# Patient Record
Sex: MALE | Race: WHITE | NOT HISPANIC OR LATINO | URBAN - METROPOLITAN AREA
[De-identification: names, ages, dates, MRNs, and addresses within clinical notes are randomized per-mention and may not be internally consistent; named-entity substitution may affect disease eponyms.]

---

## 2024-09-26 ENCOUNTER — INPATIENT (INPATIENT)
Facility: HOSPITAL | Age: 34
LOS: 13 days | Discharge: ANOTHER HOSPITAL | End: 2024-10-10
Attending: HOSPITALIST | Admitting: HOSPITALIST
Payer: COMMERCIAL

## 2024-09-26 VITALS
WEIGHT: 179.9 LBS | TEMPERATURE: 99 F | OXYGEN SATURATION: 97 % | HEART RATE: 129 BPM | SYSTOLIC BLOOD PRESSURE: 129 MMHG | DIASTOLIC BLOOD PRESSURE: 89 MMHG | RESPIRATION RATE: 16 BRPM

## 2024-09-26 DIAGNOSIS — R19.7 DIARRHEA, UNSPECIFIED: ICD-10-CM

## 2024-09-26 LAB
ALBUMIN SERPL ELPH-MCNC: 3.5 G/DL — SIGNIFICANT CHANGE UP (ref 3.3–5)
ALP SERPL-CCNC: 75 U/L — SIGNIFICANT CHANGE UP (ref 40–120)
ALT FLD-CCNC: 34 U/L — SIGNIFICANT CHANGE UP (ref 4–41)
ANION GAP SERPL CALC-SCNC: 9 MMOL/L — SIGNIFICANT CHANGE UP (ref 7–14)
ANISOCYTOSIS BLD QL: SLIGHT — SIGNIFICANT CHANGE UP
APTT BLD: 28.3 SEC — SIGNIFICANT CHANGE UP (ref 24.5–35.6)
AST SERPL-CCNC: 24 U/L — SIGNIFICANT CHANGE UP (ref 4–40)
BASOPHILS # BLD AUTO: 0.26 K/UL — HIGH (ref 0–0.2)
BASOPHILS NFR BLD AUTO: 0.9 % — SIGNIFICANT CHANGE UP (ref 0–2)
BILIRUB SERPL-MCNC: 0.3 MG/DL — SIGNIFICANT CHANGE UP (ref 0.2–1.2)
BLD GP AB SCN SERPL QL: NEGATIVE — SIGNIFICANT CHANGE UP
BLOOD GAS VENOUS COMPREHENSIVE RESULT: SIGNIFICANT CHANGE UP
BUN SERPL-MCNC: 7 MG/DL — SIGNIFICANT CHANGE UP (ref 7–23)
CALCIUM SERPL-MCNC: 8.7 MG/DL — SIGNIFICANT CHANGE UP (ref 8.4–10.5)
CHLORIDE SERPL-SCNC: 96 MMOL/L — LOW (ref 98–107)
CO2 SERPL-SCNC: 29 MMOL/L — SIGNIFICANT CHANGE UP (ref 22–31)
CREAT SERPL-MCNC: 0.97 MG/DL — SIGNIFICANT CHANGE UP (ref 0.5–1.3)
EGFR: 106 ML/MIN/1.73M2 — SIGNIFICANT CHANGE UP
EOSINOPHIL # BLD AUTO: 0.26 K/UL — SIGNIFICANT CHANGE UP (ref 0–0.5)
EOSINOPHIL NFR BLD AUTO: 0.9 % — SIGNIFICANT CHANGE UP (ref 0–6)
GLUCOSE SERPL-MCNC: 97 MG/DL — SIGNIFICANT CHANGE UP (ref 70–99)
HCT VFR BLD CALC: 47.8 % — SIGNIFICANT CHANGE UP (ref 39–50)
HGB BLD-MCNC: 15.9 G/DL — SIGNIFICANT CHANGE UP (ref 13–17)
IANC: 20.55 K/UL — HIGH (ref 1.8–7.4)
INR BLD: 1.11 RATIO — SIGNIFICANT CHANGE UP (ref 0.85–1.16)
LYMPHOCYTES # BLD AUTO: 2.52 K/UL — SIGNIFICANT CHANGE UP (ref 1–3.3)
LYMPHOCYTES # BLD AUTO: 8.7 % — LOW (ref 13–44)
MCHC RBC-ENTMCNC: 27 PG — SIGNIFICANT CHANGE UP (ref 27–34)
MCHC RBC-ENTMCNC: 33.3 GM/DL — SIGNIFICANT CHANGE UP (ref 32–36)
MCV RBC AUTO: 81.2 FL — SIGNIFICANT CHANGE UP (ref 80–100)
MICROCYTES BLD QL: SLIGHT — SIGNIFICANT CHANGE UP
MONOCYTES # BLD AUTO: 2.78 K/UL — HIGH (ref 0–0.9)
MONOCYTES NFR BLD AUTO: 9.6 % — SIGNIFICANT CHANGE UP (ref 2–14)
NEUTROPHILS # BLD AUTO: 22.64 K/UL — HIGH (ref 1.8–7.4)
NEUTROPHILS NFR BLD AUTO: 78.2 % — HIGH (ref 43–77)
PLAT MORPH BLD: NORMAL — SIGNIFICANT CHANGE UP
PLATELET # BLD AUTO: 438 K/UL — HIGH (ref 150–400)
PLATELET COUNT - ESTIMATE: NORMAL — SIGNIFICANT CHANGE UP
POLYCHROMASIA BLD QL SMEAR: SLIGHT — SIGNIFICANT CHANGE UP
POTASSIUM SERPL-MCNC: 3.8 MMOL/L — SIGNIFICANT CHANGE UP (ref 3.5–5.3)
POTASSIUM SERPL-SCNC: 3.8 MMOL/L — SIGNIFICANT CHANGE UP (ref 3.5–5.3)
PROCALCITONIN SERPL-MCNC: 0.08 NG/ML — SIGNIFICANT CHANGE UP (ref 0.02–0.1)
PROT SERPL-MCNC: 7.2 G/DL — SIGNIFICANT CHANGE UP (ref 6–8.3)
PROTHROM AB SERPL-ACNC: 12.9 SEC — SIGNIFICANT CHANGE UP (ref 9.9–13.4)
RBC # BLD: 5.89 M/UL — HIGH (ref 4.2–5.8)
RBC # FLD: 13 % — SIGNIFICANT CHANGE UP (ref 10.3–14.5)
RBC BLD AUTO: ABNORMAL
RH IG SCN BLD-IMP: NEGATIVE — SIGNIFICANT CHANGE UP
SODIUM SERPL-SCNC: 134 MMOL/L — LOW (ref 135–145)
VARIANT LYMPHS # BLD: 1.7 % — SIGNIFICANT CHANGE UP (ref 0–6)
WBC # BLD: 28.95 K/UL — HIGH (ref 3.8–10.5)
WBC # FLD AUTO: 28.95 K/UL — HIGH (ref 3.8–10.5)

## 2024-09-26 PROCEDURE — 74177 CT ABD & PELVIS W/CONTRAST: CPT | Mod: 26,MC

## 2024-09-26 PROCEDURE — 99285 EMERGENCY DEPT VISIT HI MDM: CPT

## 2024-09-26 PROCEDURE — 99223 1ST HOSP IP/OBS HIGH 75: CPT

## 2024-09-26 RX ORDER — SODIUM CHLORIDE 0.9 % (FLUSH) 0.9 %
1000 SYRINGE (ML) INJECTION ONCE
Refills: 0 | Status: COMPLETED | OUTPATIENT
Start: 2024-09-26 | End: 2024-09-26

## 2024-09-26 RX ORDER — ACETAMINOPHEN 325 MG
1000 TABLET ORAL ONCE
Refills: 0 | Status: COMPLETED | OUTPATIENT
Start: 2024-09-26 | End: 2024-09-26

## 2024-09-26 RX ADMIN — Medication 200 MILLIGRAM(S): at 19:38

## 2024-09-26 RX ADMIN — Medication 1000 MILLILITER(S): at 18:59

## 2024-09-26 RX ADMIN — Medication 100 MILLIGRAM(S): at 21:28

## 2024-09-26 NOTE — ED PROVIDER NOTE - PROGRESS NOTE DETAILS
Saint Chris, DO (PGY2): Labs notable for WBC 28. Although no tenderness on exam concerns for intra-abdominal pathology such as abscess, fistula, toxic megacolon. CT scan ordered. Will give IVF, antibiotics. Will obtain vbg and blood cultures. Saint Chris, DO (PGY2): spoke with [facility] team. Patient would not be undergoing colonoscopy given active infection. Recommending continuing antibiotics and admission. imaging notable for UC flare in setting of c diff infection. will admit for GI evaluation. Peter PGY2

## 2024-09-26 NOTE — H&P ADULT - HISTORY OF PRESENT ILLNESS
34M PMH ulcerative colitis following with Dr. Osullivan of [facility] presents with 8-10 episodes of bloody diarrhea during the day with further episodes of bloody diarrhea at night. Reports symptoms have been ongoing for appx. 3 months with acute worsening in the past day or so. Was diagnosed with C.diff on Sunday and was given fidaxomycin - has been taking for past 3 days. Reportedly is on Stelara and prednisone 40 daily. Patient reports he was told to come to the nearest hospital for urgent colonoscopy thus presenting to [facility]. ED spoke to patient's primary UC team who are deferring colonscopy at this time given active infection.  34M PMH ulcerative colitis following with Dr. Osullivan of [facility] presents with 8-10 episodes of bloody diarrhea during the day with further episodes of bloody diarrhea at night concerning for UC flare. He reports his symptoms have been ongoing for appx. 3 months and have been managed outpatient with his [facility] UC team. Initially was tapering down steroids from 40 daily down to 15, however due to recent flare was increased back to 40mg daily. He was previously on Entyvio for maintenence therapy but did not show improvement with symptoms and was switched appx. 1 month ago to Stelara - notes he received loading dose and 1 maintenence dose with some improvement, noting that after taking AM dose of prednisone his stool frequency improved and were less bloody throughout the day. Of note he was diagnosed with C.diff on Sunday and was given fidaxomycin - has been taking for past 3 days BID. Patient reports he was told to come to the nearest hospital for urgent colonoscopy thus presenting to [facility]. ED spoke to patient's primary UC team who are deferring colonscopy at this time given active infection. Denies any abdominal pain at this time, notes bowel movements are mix of blood and stool.

## 2024-09-26 NOTE — H&P ADULT - PROBLEM SELECTOR PLAN 1
-pt with Hx UC presenting with ongoing flare for 3 months notable for episodes of bloody diarrhea  -follows primarily with [facility], was previously on entyvio but switched to stelara 1 month ago s/p loading dose and 1 maintenance dose  -currently on prednisone 40 daily  -will c/w cipro/flagyl at this time  -GI consult for evaluation of symptoms and need for IV steroids, emailed -pt with Hx UC presenting with ongoing flare for 3 months notable for episodes of bloody diarrhea  -follows primarily with [facility], was previously on entyvio but switched to stelara 1 month ago s/p loading dose and 1 maintenance dose  -currently on prednisone 40 daily  -will c/w cipro/flagyl at this time  -GI consult for evaluation of symptoms and need for IV steroids, emailed  -monitor Hgb, currently stable

## 2024-09-26 NOTE — H&P ADULT - PROBLEM SELECTOR PLAN 2
-pt reports he tested positive for C. diff several days prior to admission  -on fidaxomycin outpt, has been taking BID  -will repeat stool testing here - GI PCR, C.diff, stool culture  -pt. brought own dose of fidaxomycin, can administer as per pharmacy to continue   -ID consult in AM   -monitor electrolytes  -contact precautions

## 2024-09-26 NOTE — ED ADULT NURSE NOTE - NSFALLUNIVINTERV_ED_ALL_ED
Bed/Stretcher in lowest position, wheels locked, appropriate side rails in place/Call bell, personal items and telephone in reach/Instruct patient to call for assistance before getting out of bed/chair/stretcher/Non-slip footwear applied when patient is off stretcher/[place] to call system/Physically safe environment - no spills, clutter or unnecessary equipment/Purposeful proactive rounding/Room/bathroom lighting operational, light cord in reach

## 2024-09-26 NOTE — H&P ADULT - NSHPREVIEWOFSYSTEMS_GEN_ALL_CORE
REVIEW OF SYSTEMS:    CONSTITUTIONAL: No weakness, fevers or chills  EYES/ENT: No visual changes;  No vertigo or throat pain   NECK: No pain or stiffness  RESPIRATORY: No cough, wheezing, hemoptysis; No shortness of breath  CARDIOVASCULAR: No chest pain or palpitations  GASTROINTESTINAL: No abdominal or epigastric pain. No nausea, vomiting, or hematemesis; No diarrhea or constipation. No melena or hematochezia.  GENITOURINARY: No dysuria, frequency or hematuria  NEUROLOGICAL: No numbness or weakness  SKIN: No itching, rashes REVIEW OF SYSTEMS:    CONSTITUTIONAL: No weakness, fevers or chills  EYES/ENT: No visual changes;  No vertigo or throat pain   NECK: No pain or stiffness  RESPIRATORY: No cough, wheezing, hemoptysis; No shortness of breath  CARDIOVASCULAR: No chest pain or palpitations  GASTROINTESTINAL: No abdominal or epigastric pain. No nausea, vomiting, or hematemesis; +bloody diarrhea -constipation. Mixed hematochezia and stool w/o melena.  GENITOURINARY: No dysuria, frequency or hematuria  NEUROLOGICAL: No numbness or weakness  SKIN: No itching, rashes

## 2024-09-26 NOTE — H&P ADULT - NSHPPHYSICALEXAM_GEN_ALL_CORE
VITALS:   T(C): 36.8 (09-26-24 @ 23:55), Max: 37.2 (09-26-24 @ 15:14)  HR: 96 (09-26-24 @ 23:55) (96 - 129)  BP: 108/64 (09-26-24 @ 23:55) (108/64 - 132/95)  RR: 18 (09-26-24 @ 23:55) (16 - 18)  SpO2: 100% (09-26-24 @ 23:55) (97% - 100%)    GENERAL: NAD, lying in bed comfortably  HEAD:  Atraumatic, normocephalic  EYES: EOMI, PERRLA, conjunctiva and sclera clear  ENT: Moist mucous membranes  NECK: Supple, no JVD  HEART: Regular rate and rhythm, no murmurs, rubs, or gallops  LUNGS: Unlabored respirations.  Clear to auscultation bilaterally, no crackles, wheezing, or rhonchi  ABDOMEN: Soft, nontender, nondistended, +BS  EXTREMITIES: 2+ peripheral pulses bilaterally. No clubbing, cyanosis, or edema  NERVOUS SYSTEM:  A&Ox3, no focal deficits   SKIN: No rashes or lesions

## 2024-09-26 NOTE — H&P ADULT - ASSESSMENT
34M PMH ulcerative colitis following with Dr. Osullivan of [facility] presents with 8-10 episodes of bloody diarrhea during the day with further episodes of bloody diarrhea at night, advised to come in to hospital for treatment of UC flare.

## 2024-09-26 NOTE — ED ADULT NURSE NOTE - OBJECTIVE STATEMENT
This is a 33 y/o male alert and oriented x 4, with a past medical history of recent diagnosis of c-diff, ulcerative colitis. Discomfort at 2/10. Abdomen is soft and bowel sounds present. Non tender on palpation. Chest is clear bilaterally. No sob, not in any distress. Reports he had bloody diarrhea x 10 today. Pallor noted. IV initiated on left AC g 20, blood work drawn. Waiting for physician assessment.

## 2024-09-26 NOTE — H&P ADULT - NSHPLABSRESULTS_GEN_ALL_CORE
LABS:                          15.9   28.95 )-----------( 438      ( 26 Sep 2024 18:03 )             47.8     09-26    134[L]  |  96[L]  |  7   ----------------------------<  97  3.8   |  29  |  0.97    Ca    8.7      26 Sep 2024 18:03    TPro  7.2  /  Alb  3.5  /  TBili  0.3  /  DBili  x   /  AST  24  /  ALT  34  /  AlkPhos  75  09-26    PT/INR - ( 26 Sep 2024 18:35 )   PT: 12.9 sec;   INR: 1.11 ratio         PTT - ( 26 Sep 2024 18:35 )  PTT:28.3 sec

## 2024-09-26 NOTE — ED PROVIDER NOTE - CLINICAL SUMMARY MEDICAL DECISION MAKING FREE TEXT BOX
Saint Chris, DO (PGY2): 35 y/o male, with history of UC, presenting to the ED today for concerns for worsening UC flare. He follows Dr. Osullivan ([place]), states he was sent to the ED for urgent colonoscopy Saint Chris, DO (PGY2): 33 y/o male, with history of UC, presenting to the ED today for concerns for worsening UC flare. Flare started about 3 months ago. He is currently taking Stelara and prednisone 40. He follows Dr. Osullivan ([place]), states that he was sent to the ED for urgent colonoscopy (came to [facility] as it was the closest ED to his home). He endorses bloody diarrhea, about 8-10 episodes during the day, and 8 episodes at night. States that he has been tachycardic since Tuesday, although no chest pain, SOB, lightheadedness, dizziness, neurological symptoms. Patient reports recent C Diff diagnosis on Sunday, has been prescribed Fidaxomycin, and has taken it for 3 days.    GENERAL: no acute distress, non-toxic appearing  HEAD: normocephalic, atraumatic  HEENT: oral mucosa moist, full ROM of neck  CARDIAC: regular rate rhythm, normal S1/S2  CHEST: CTA BL, no wheeze or crackles  ABDOMEN: normal BS, soft, no tenderness  EXTREMITY: no gross deformity, no edema, good perfusion   NEURO: alert and orientedx3, no focal neurological deficits    Given tachycardia in setting of bloody diarrhea, concerns for anemia. Would also consider electrolyte abnormalities. Abdomen soft and nontender, low concerns for intraabdominal pathology at this time. Will evaluate with CBC, CMP. Will obtain type and screen. Dispo and further management pending results and reassessment.

## 2024-09-26 NOTE — ED ADULT TRIAGE NOTE - CHIEF COMPLAINT QUOTE
Pt c/o multiple episodes of diarrhea, reports was diagnosed with c.diff on sunday. Endorsing bloody stool and palpitations. Denies dizziness, sob, chest pain. pmhx: ulcerative colitis

## 2024-09-26 NOTE — ED PROVIDER NOTE - ATTENDING CONTRIBUTION TO CARE
35 yo M with h/o UC and recent diagnosis of c diff earlier this week who presents to the ED c/o bloody diarrhea for the past two weeks. He reports that he has been taking prednisone 40 mg a day for the past week and having 8-10 episodes of diarrhea a day. Was started on oral abx after being diagnosed with c diff but he reports that his GI physician (who is affiliated with [facility]) recommended that he come to the ED for admission to have a colonoscopy (?). Pt denies abdominal pain and is non-tender on exam but it tachycardic to 115 and has a leukocytosis of 28. Will give IV fluids and obtain CT abd/pelvis to rule out acute intraabdominal process complicating his UC and C diff

## 2024-09-26 NOTE — ED ADULT NURSE NOTE - NS PRO PASSIVE SMOKE EXP
Spoke with pt re: below. Pt took 2 days worth of medication and BP came down to 120/70's. Pt would like to track BP off medication and will call back with update. If BP not at goal willing to try other medication except Aldactone d/t long term side effects. Agreeable to all.   Unknown

## 2024-09-27 DIAGNOSIS — K51.90 ULCERATIVE COLITIS, UNSPECIFIED, WITHOUT COMPLICATIONS: ICD-10-CM

## 2024-09-27 DIAGNOSIS — A04.72 ENTEROCOLITIS DUE TO CLOSTRIDIUM DIFFICILE, NOT SPECIFIED AS RECURRENT: ICD-10-CM

## 2024-09-27 DIAGNOSIS — Z29.9 ENCOUNTER FOR PROPHYLACTIC MEASURES, UNSPECIFIED: ICD-10-CM

## 2024-09-27 LAB
ALBUMIN SERPL ELPH-MCNC: 3.1 G/DL — LOW (ref 3.3–5)
ALP SERPL-CCNC: 57 U/L — SIGNIFICANT CHANGE UP (ref 40–120)
ALT FLD-CCNC: 31 U/L — SIGNIFICANT CHANGE UP (ref 4–41)
ANION GAP SERPL CALC-SCNC: 11 MMOL/L — SIGNIFICANT CHANGE UP (ref 7–14)
AST SERPL-CCNC: 28 U/L — SIGNIFICANT CHANGE UP (ref 4–40)
BILIRUB SERPL-MCNC: 0.3 MG/DL — SIGNIFICANT CHANGE UP (ref 0.2–1.2)
BUN SERPL-MCNC: 6 MG/DL — LOW (ref 7–23)
C DIFF GDH STL QL: POSITIVE — SIGNIFICANT CHANGE UP
C DIFF GDH STL QL: SIGNIFICANT CHANGE UP
CALCIUM SERPL-MCNC: 8 MG/DL — LOW (ref 8.4–10.5)
CHLORIDE SERPL-SCNC: 98 MMOL/L — SIGNIFICANT CHANGE UP (ref 98–107)
CO2 SERPL-SCNC: 24 MMOL/L — SIGNIFICANT CHANGE UP (ref 22–31)
CREAT SERPL-MCNC: 0.89 MG/DL — SIGNIFICANT CHANGE UP (ref 0.5–1.3)
EGFR: 117 ML/MIN/1.73M2 — SIGNIFICANT CHANGE UP
GI PCR PANEL: SIGNIFICANT CHANGE UP
GLUCOSE SERPL-MCNC: 92 MG/DL — SIGNIFICANT CHANGE UP (ref 70–99)
HCT VFR BLD CALC: 43.4 % — SIGNIFICANT CHANGE UP (ref 39–50)
HGB BLD-MCNC: 14.6 G/DL — SIGNIFICANT CHANGE UP (ref 13–17)
MAGNESIUM SERPL-MCNC: 2.2 MG/DL — SIGNIFICANT CHANGE UP (ref 1.6–2.6)
MCHC RBC-ENTMCNC: 27.2 PG — SIGNIFICANT CHANGE UP (ref 27–34)
MCHC RBC-ENTMCNC: 33.6 GM/DL — SIGNIFICANT CHANGE UP (ref 32–36)
MCV RBC AUTO: 81 FL — SIGNIFICANT CHANGE UP (ref 80–100)
NRBC # BLD: 0 /100 WBCS — SIGNIFICANT CHANGE UP (ref 0–0)
NRBC # FLD: 0 K/UL — SIGNIFICANT CHANGE UP (ref 0–0)
PHOSPHATE SERPL-MCNC: 3.7 MG/DL — SIGNIFICANT CHANGE UP (ref 2.5–4.5)
PLATELET # BLD AUTO: 382 K/UL — SIGNIFICANT CHANGE UP (ref 150–400)
POTASSIUM SERPL-MCNC: 3.8 MMOL/L — SIGNIFICANT CHANGE UP (ref 3.5–5.3)
POTASSIUM SERPL-SCNC: 3.8 MMOL/L — SIGNIFICANT CHANGE UP (ref 3.5–5.3)
PROT SERPL-MCNC: 6.1 G/DL — SIGNIFICANT CHANGE UP (ref 6–8.3)
RBC # BLD: 5.36 M/UL — SIGNIFICANT CHANGE UP (ref 4.2–5.8)
RBC # FLD: 13.1 % — SIGNIFICANT CHANGE UP (ref 10.3–14.5)
SODIUM SERPL-SCNC: 133 MMOL/L — LOW (ref 135–145)
WBC # BLD: 25.35 K/UL — HIGH (ref 3.8–10.5)
WBC # FLD AUTO: 25.35 K/UL — HIGH (ref 3.8–10.5)

## 2024-09-27 PROCEDURE — 99222 1ST HOSP IP/OBS MODERATE 55: CPT

## 2024-09-27 PROCEDURE — 99233 SBSQ HOSP IP/OBS HIGH 50: CPT

## 2024-09-27 PROCEDURE — 99222 1ST HOSP IP/OBS MODERATE 55: CPT | Mod: GC

## 2024-09-27 RX ORDER — FIDAXOMICIN 200 MG/5ML
200 GRANULE, FOR SUSPENSION ORAL
Refills: 0 | Status: DISCONTINUED | OUTPATIENT
Start: 2024-09-27 | End: 2024-10-03

## 2024-09-27 RX ORDER — INFLUENZA VIRUS VACCINE 15; 15; 15; 15 UG/.5ML; UG/.5ML; UG/.5ML; UG/.5ML
0.5 SUSPENSION INTRAMUSCULAR ONCE
Refills: 0 | Status: DISCONTINUED | OUTPATIENT
Start: 2024-09-27 | End: 2024-10-10

## 2024-09-27 RX ORDER — ONDANSETRON HCL/PF 4 MG/2 ML
4 VIAL (ML) INJECTION ONCE
Refills: 0 | Status: COMPLETED | OUTPATIENT
Start: 2024-09-27 | End: 2024-09-27

## 2024-09-27 RX ORDER — METHYLPREDNISOLONE ACETATE 80 MG/ML
20 INJECTION, SUSPENSION INTRA-ARTICULAR; INTRALESIONAL; INTRAMUSCULAR; SOFT TISSUE EVERY 8 HOURS
Refills: 0 | Status: DISCONTINUED | OUTPATIENT
Start: 2024-09-27 | End: 2024-10-10

## 2024-09-27 RX ORDER — METOCLOPRAMIDE HCL 5 MG
10 TABLET ORAL ONCE
Refills: 0 | Status: COMPLETED | OUTPATIENT
Start: 2024-09-27 | End: 2024-09-27

## 2024-09-27 RX ORDER — SODIUM CHLORIDE 0.9 % (FLUSH) 0.9 %
1000 SYRINGE (ML) INJECTION
Refills: 0 | Status: DISCONTINUED | OUTPATIENT
Start: 2024-09-27 | End: 2024-10-10

## 2024-09-27 RX ADMIN — FIDAXOMICIN 200 MILLIGRAM(S): 200 GRANULE, FOR SUSPENSION ORAL at 17:47

## 2024-09-27 RX ADMIN — Medication 100 MILLILITER(S): at 02:21

## 2024-09-27 RX ADMIN — Medication 100 MILLIGRAM(S): at 05:23

## 2024-09-27 RX ADMIN — Medication 200 MILLIGRAM(S): at 05:22

## 2024-09-27 RX ADMIN — Medication 10 MILLIGRAM(S): at 15:53

## 2024-09-27 RX ADMIN — METHYLPREDNISOLONE ACETATE 20 MILLIGRAM(S): 80 INJECTION, SUSPENSION INTRA-ARTICULAR; INTRALESIONAL; INTRAMUSCULAR; SOFT TISSUE at 17:46

## 2024-09-27 RX ADMIN — FIDAXOMICIN 200 MILLIGRAM(S): 200 GRANULE, FOR SUSPENSION ORAL at 05:27

## 2024-09-27 RX ADMIN — Medication 1000 MILLILITER(S): at 00:14

## 2024-09-27 NOTE — DISCHARGE NOTE PROVIDER - PROVIDER TOKENS
FREE:[LAST:[Primary care provider],PHONE:[(   )    -],FAX:[(   )    -]] FREE:[LAST:[Primary care provider],PHONE:[(   )    -],FAX:[(   )    -]],PROVIDER:[TOKEN:[[id number]]]

## 2024-09-27 NOTE — PROGRESS NOTE ADULT - PROBLEM SELECTOR PLAN 2
-pt reports he tested positive for C. diff several days prior to admission  -on fidaxomycin outpt, has been taking BID  -will repeat stool testing here - GI PCR, C.diff, stool culture  -pt. brought own dose of fidaxomycin, can administer as per pharmacy to continue   -ID consulted  -monitor electrolytes  -contact precautions  -procal wnl, blood culture pending [facility] Pregnancy And Lactation Text: This medication has not been assigned a Pregnancy Risk Category. It is unknown if the medication is excreted in breast milk.

## 2024-09-27 NOTE — ED ADULT NURSE REASSESSMENT NOTE - NS ED NURSE REASSESS COMMENT FT1
Break coverage RN: Pt A&Ox4, resting in stretcher no acute distress noted. Pt denies chest pain, SOB, headache, dizziness, N/V/D, chills verbalized. respirations even and unlabored. safety maintained, call bell within reach
Due meds given, no verbal complaints at this time.
Offer no complaints at this time. RR even and unlabored. VS as charted. Pending bed assignment. Pt safety maintained.
Pt received in Rm 7 in no acute distress. Denies headache, dizziness, nausea, vomiting, abd pain, chest pain, SOB. RR even and unlabored. Pt safety maintained. Pend abx infusion.
Pt noted to be in no acute distress. Denies headache, dizziness, nausea, vomiting, abd pain, chest pain, SOB. Fluids infusing as ordered. Pt safety maintained. Pending bed assignment.

## 2024-09-27 NOTE — CONSULT NOTE ADULT - SUBJECTIVE AND OBJECTIVE BOX
INFECTIOUS DISEASE CONSULT NOTE    Patient is a 34y old  Male who presents with a chief complaint of UC flare w/ bloody diarrhea (27 Sep 2024 08:35)    HPI:  34M medical history of ulcerative colitis  presents with 8-10 episodes of bloody diarrhea concerning for UC flare. He reports his symptoms have been ongoing for appx. 3 months and have been managed outpatient with his [facility] UC team. Initially was tapering down steroids from 40 daily down to 15, however due to recent flare was increased back to 40mg daily. He was previously on Entyvio for maintenence therapy but did not show improvement with symptoms and was switched appx. 1 month ago to Stelara - notes he received loading dose and 1 maintenence dose with some improvement, noting that after taking AM dose of prednisone his stool frequency improved and were less bloody throughout the day. Of note he was diagnosed with C.diff on Sunday and was given fidaxomycin - has been taking for past 3 days BID. Patient reports he was told to come to the nearest hospital for urgent colonoscopy thus presenting to [facility]. ED spoke to patient's primary UC team who are deferring colonscopy at this time given active infection. Denies any abdominal pain at this time, notes bowel movements are mix of blood and stool. (26 Sep 2024 23:59)       REVIEW OF SYSTEMS:      Prior hospital charts reviewed [Yes]  Primary team notes reviewed [Yes]  Other consultant notes reviewed [Yes]    PAST MEDICAL & SURGICAL HISTORY:  Ulcerative colitis      No significant past surgical history          SOCIAL HISTORY:      FAMILY HISTORY:  No pertinent family history in first degree relatives        Allergies:  No Known Allergies  mesalamine (Other (Mod to Severe))      ANTIMICROBIALS:  ciprofloxacin   IVPB    ciprofloxacin   IVPB 400 every 12 hours  fidaxomicin 200 two times a day  metroNIDAZOLE  IVPB 500 every 8 hours      ANTIMICROBIALS (past 90 days):  MEDICATIONS  (STANDING):    ciprofloxacin   IVPB   200 mL/Hr IV Intermittent (09-27-24 @ 05:22)    ciprofloxacin   IVPB   200 mL/Hr IV Intermittent (09-26-24 @ 19:38)    fidaxomicin   200 milliGRAM(s) Oral (09-27-24 @ 05:27)    metroNIDAZOLE  IVPB   100 mL/Hr IV Intermittent (09-26-24 @ 21:28)    metroNIDAZOLE  IVPB   100 mL/Hr IV Intermittent (09-27-24 @ 05:23)        OTHER MEDS:   MEDICATIONS  (STANDING):  influenza   Vaccine 0.5 once      VITALS:  Vital Signs Last 24 Hrs  T(F): 98.2 (09-27-24 @ 10:46), Max: 99 (09-26-24 @ 15:14)    Vital Signs Last 24 Hrs  HR: 105 (09-27-24 @ 10:46) (96 - 129)  BP: 133/89 (09-27-24 @ 10:46) (108/64 - 148/61)  RR: 18 (09-27-24 @ 10:46)  SpO2: 98% (09-27-24 @ 10:46) (97% - 100%)  Wt(kg): --    EXAM:      Labs:                        14.6   25.35 )-----------( 382      ( 27 Sep 2024 05:15 )             43.4     09-27    133[L]  |  98  |  6[L]  ----------------------------<  92  3.8   |  24  |  0.89    Ca    8.0[L]      27 Sep 2024 05:15  Phos  3.7     09-27  Mg     2.20     09-27    TPro  6.1  /  Alb  3.1[L]  /  TBili  0.3  /  DBili  x   /  AST  28  /  ALT  31  /  AlkPhos  57  09-27      WBC Trend:  WBC Count: 25.35 (09-27-24 @ 05:15)  WBC Count: 28.95 (09-26-24 @ 18:03)      Auto Neutrophil #: 22.64 K/uL (09-26-24 @ 18:03)      Creatine Trend:  Creatinine: 0.89 (09-27)  Creatinine: 0.97 (09-26)      Liver Biochemical Testing Trend:  Alanine Aminotransferase (ALT/SGPT): 31 (09-27)  Alanine Aminotransferase (ALT/SGPT): 34 (09-26)  Aspartate Aminotransferase (AST/SGOT): 28 (09-27-24 @ 05:15)  Aspartate Aminotransferase (AST/SGOT): 24 (09-26-24 @ 18:03)  Bilirubin Total: 0.3 (09-27)  Bilirubin Total: 0.3 (09-26)      Trend LDH      Auto Eosinophil %: 0.9 % (09-26-24 @ 18:03)                  MICROBIOLOGY:      Clostridium difficile GDH Toxins A&B, EIA: Positive  Clostridium difficile GD Interpretation: Positive for toxigenic C. Difficile.     Procalcitonin: 0.08 (09-26)    Blood Gas Venous - Lactate: 1.7 (09-26 @ 18:35)        RADIOLOGY:  < from: CT Abdomen and Pelvis w/ IV Cont (09.26.24 @ 21:15) >  FINDINGS:  LOWER CHEST: Within normal limits.    LIVER: Within normal limits.  BILE DUCTS: Normal caliber.  GALLBLADDER: Within normal limits.  SPLEEN: Within normal limits.  PANCREAS: Within normal limits.  ADRENALS: Within normal limits.  KIDNEYS/URETERS: Within normal limits.    BLADDER: Within normal limits.  REPRODUCTIVE ORGANS: Prostate is within normal limits.    BOWEL: No bowel obstruction. Appendix is normal. Diffuse mild colonic   wall thickening with loss of normal haustral markings on the left   consistent with ulcerative colitis and likely acute flare. No pneumatosis   or free air. No gross stricturing.  PERITONEUM/RETROPERITONEUM: Within normal limits.  VESSELS: Within normal limits.  LYMPH NODES: No lymphadenopathy.  ABDOMINAL WALL: Within normal limits.  BONES: Within normal limits.    IMPRESSION:  UC flare.       INFECTIOUS DISEASE CONSULT NOTE    Patient is a 34y old  Male who presents with a chief complaint of UC flare w/ bloody diarrhea (27 Sep 2024 08:35)    HPI:  34M medical history of ulcerative colitis  presents with 8-10 episodes of bloody diarrhea concerning for UC flare. He reports his symptoms have been ongoing for approximately 3 months and have been managed outpatient with his [facility] UC team. Initially was tapering down steroids from 40 daily down to 15, however due to recent flare was increased back to 40mg daily. He was previously on Entyvio for maintenence therapy but did not show improvement with symptoms and was switched appx. 1 month ago to Stelara - notes he received loading dose and 1 maintenance dose with some improvement, noting that after taking AM dose of prednisone his stool frequency improved and were less bloody throughout the day. Of note he was diagnosed with C.diff on Sunday and was given fidaxomycin - has been taking for past 3 days BID. Patient reports he was told to come to the nearest hospital for urgent colonoscopy thus presenting to [facility]. ED spoke to patient's primary UC team who are deferring colonscopy at this time given active infection. Denies any abdominal pain at this time, notes bowel movements are mix of blood and stool. (26 Sep 2024 23:59)       REVIEW OF SYSTEMS:  Constitutional: No fevers, No chills  Skin: No rash, no phlebitis	  Eyes: No discharge, No change in vision	  ENMT: No sore throat, No ulcers  Respiratory: No cough, no SOB  Cardiovascular:  No chest pain, No palpitations   Gastrointestinal: +abdominal pain, diarrhea  Genitourinary: No dysuria, No frequency, No hesitancy, No flank pain  MSK: No Joint pain, No back pain, No edema  Neurological: No HA, no weakness, no seizures, no AMS      Prior hospital charts reviewed [Yes]  Primary team notes reviewed [Yes]  Other consultant notes reviewed [Yes]    PAST MEDICAL & SURGICAL HISTORY:  Ulcerative colitis      No significant past surgical history          SOCIAL HISTORY:  Denies smoking  Denies alcohol use      FAMILY HISTORY:  No pertinent family history in first degree relatives        Allergies:  No Known Allergies  mesalamine (Other (Mod to Severe))      ANTIMICROBIALS:  ciprofloxacin   IVPB    ciprofloxacin   IVPB 400 every 12 hours  fidaxomicin 200 two times a day  metroNIDAZOLE  IVPB 500 every 8 hours      ANTIMICROBIALS (past 90 days):  MEDICATIONS  (STANDING):    ciprofloxacin   IVPB   200 mL/Hr IV Intermittent (09-27-24 @ 05:22)    ciprofloxacin   IVPB   200 mL/Hr IV Intermittent (09-26-24 @ 19:38)    fidaxomicin   200 milliGRAM(s) Oral (09-27-24 @ 05:27)    metroNIDAZOLE  IVPB   100 mL/Hr IV Intermittent (09-26-24 @ 21:28)    metroNIDAZOLE  IVPB   100 mL/Hr IV Intermittent (09-27-24 @ 05:23)        OTHER MEDS:   MEDICATIONS  (STANDING):  influenza   Vaccine 0.5 once      VITALS:  Vital Signs Last 24 Hrs  T(F): 98.2 (09-27-24 @ 10:46), Max: 99 (09-26-24 @ 15:14)    Vital Signs Last 24 Hrs  HR: 105 (09-27-24 @ 10:46) (96 - 129)  BP: 133/89 (09-27-24 @ 10:46) (108/64 - 148/61)  RR: 18 (09-27-24 @ 10:46)  SpO2: 98% (09-27-24 @ 10:46) (97% - 100%)  Wt(kg): --    EXAM:  General: Patient appears comfortable, in no acute distress  HEENT: PERRL, anicteric sclera, mucous membranes moist   Neck: Supple, No lymphadenopathy  CV: +S1/S2, RRR, no murmurs heard  Lungs: No respiratory distress, CTA b/l  Abd:  BS4+, Soft, NTND, no guarding  : No suprapubic tenderness  Neuro: AAOx3. No focal deficits noted.   Ext: No cyanosis, no edema  Msk: freely moving upper and lower extremities  Skin: No rash, no phlebitis, No erythema      Labs:                        14.6   25.35 )-----------( 382      ( 27 Sep 2024 05:15 )             43.4     09-27    133[L]  |  98  |  6[L]  ----------------------------<  92  3.8   |  24  |  0.89    Ca    8.0[L]      27 Sep 2024 05:15  Phos  3.7     09-27  Mg     2.20     09-27    TPro  6.1  /  Alb  3.1[L]  /  TBili  0.3  /  DBili  x   /  AST  28  /  ALT  31  /  AlkPhos  57  09-27      WBC Trend:  WBC Count: 25.35 (09-27-24 @ 05:15)  WBC Count: 28.95 (09-26-24 @ 18:03)      Auto Neutrophil #: 22.64 K/uL (09-26-24 @ 18:03)      Creatine Trend:  Creatinine: 0.89 (09-27)  Creatinine: 0.97 (09-26)      Liver Biochemical Testing Trend:  Alanine Aminotransferase (ALT/SGPT): 31 (09-27)  Alanine Aminotransferase (ALT/SGPT): 34 (09-26)  Aspartate Aminotransferase (AST/SGOT): 28 (09-27-24 @ 05:15)  Aspartate Aminotransferase (AST/SGOT): 24 (09-26-24 @ 18:03)  Bilirubin Total: 0.3 (09-27)  Bilirubin Total: 0.3 (09-26)      Trend LDH      Auto Eosinophil %: 0.9 % (09-26-24 @ 18:03)                  MICROBIOLOGY:      Clostridium difficile GDH Toxins A&B, EIA: Positive  Clostridium difficile GDH Interpretation: Positive for toxigenic C. Difficile.     Procalcitonin: 0.08 (09-26)    Blood Gas Venous - Lactate: 1.7 (09-26 @ 18:35)        RADIOLOGY:  < from: CT Abdomen and Pelvis w/ IV Cont (09.26.24 @ 21:15) >  FINDINGS:  LOWER CHEST: Within normal limits.    LIVER: Within normal limits.  BILE DUCTS: Normal caliber.  GALLBLADDER: Within normal limits.  SPLEEN: Within normal limits.  PANCREAS: Within normal limits.  ADRENALS: Within normal limits.  KIDNEYS/URETERS: Within normal limits.    BLADDER: Within normal limits.  REPRODUCTIVE ORGANS: Prostate is within normal limits.    BOWEL: No bowel obstruction. Appendix is normal. Diffuse mild colonic   wall thickening with loss of normal haustral markings on the left   consistent with ulcerative colitis and likely acute flare. No pneumatosis   or free air. No gross stricturing.  PERITONEUM/RETROPERITONEUM: Within normal limits.  VESSELS: Within normal limits.  LYMPH NODES: No lymphadenopathy.  ABDOMINAL WALL: Within normal limits.  BONES: Within normal limits.    IMPRESSION:  UC flare.

## 2024-09-27 NOTE — DISCHARGE NOTE PROVIDER - NSDCFUADDAPPT_GEN_ALL_CORE_FT
Please follow up with your primary care provider in 1-2 weeks following discharge from the hospital for continued monitoring and management.  Please follow up with your primary care provider in 1-2 weeks following discharge from the hospital for continued monitoring and management.     Follow up with gastroenterology within 1-2 weeks of discharge.

## 2024-09-27 NOTE — DISCHARGE NOTE PROVIDER - NSDCMRMEDTOKEN_GEN_ALL_CORE_FT
fidaxomicin 200 mg oral tablet: 1 tab(s) orally 2 times a day  predniSONE: 40 milligram(s) once a day  ustekinumab:    fidaxomicin 200 mg oral tablet: 1 tab(s) orally 2 times a day  methylPREDNISolone: 20 milligram(s) intravenous every 8 hours  pantoprazole 40 mg oral delayed release tablet: 1 tab(s) orally once a day (before a meal)   melatonin 3 mg oral tablet: 1 tab(s) orally once a day (at bedtime) As needed Insomnia  methylPREDNISolone: 20 milligram(s) intravenous every 8 hours  pantoprazole 40 mg oral delayed release tablet: 1 tab(s) orally once a day (before a meal)

## 2024-09-27 NOTE — DISCHARGE NOTE PROVIDER - NSFOLLOWUPCLINICS_GEN_ALL_ED_FT
[facility]  Gastroenterology  [address] [place] [address] [address]  [place] [address]  Phone: [phone]  Fax:

## 2024-09-27 NOTE — CONSULT NOTE ADULT - ASSESSMENT
33 yo M with pmhx of UC comes to the ED after experiencing worsening diarrhea for the past one month with outpatient stool studies positive for C Diff and initiation of fidaxomicin. On admission, labs significant for elevated WBC and CT A/P showing UC flare. GI consulted for further management    Impression:  #UC  #C diff +  #bloody diarrhea     - patient with hx of UC and trialed on multiple biologics and steroid taper  - also found to be C diff + outpatient  35 yo M with pmhx of UC comes to the ED after experiencing worsening diarrhea for the past one month with outpatient stool studies positive for C Diff and initiation of fidaxomicin. On admission, labs significant for elevated WBC and CT A/P showing UC flare. GI consulted for further management    Impression:  #UC flare  #C diff +  #bloody diarrhea     - patient with hx of UC and trialed on multiple biologics and steroid taper  - also found to be C diff + outpatient   - overall would favor presentation to be UC flare due to bloody diarrhea, as C Diff does not usually cause hematochezia    Recommendations:  - obtain GI PCR, C diff studies, fecal calprotectin, stool culture  - start solu-medrol 20 q8  - obtain colorectal surgery consult as patient has had failed multiple UC therapies  - DVT ppx as patients with UC flare are high risk for VTE   - continue with fidaxomicin; can discontinue cipro/flagyl  - avoid opioids, anti-cholinergics, and anti-diarrheals as these can predispose to toxic megacolon  - avoid NSAIDs   - will consider flex sig today pending scheduling availability today   35 yo M with pmhx of UC comes to the ED after experiencing worsening diarrhea for the past one month with outpatient stool studies positive for C Diff and initiation of fidaxomicin. On admission, labs significant for elevated WBC and CT A/P showing UC flare. GI consulted for further management    Impression:  #UC flare  #C diff +  #bloody diarrhea     - patient with hx of UC and trialed on multiple biologics and steroid taper  - also found to be C diff + outpatient   - overall would favor presentation to be UC flare due to bloody diarrhea, as C Diff does not usually cause hematochezia    Recommendations:  - obtain GI PCR, C diff studies, fecal calprotectin, stool culture  - start solu-medrol 20 q8  - obtain colorectal surgery consult as patient has had failed multiple UC therapies  - DVT ppx as patients with UC flare are high risk for VTE   - continue with fidaxomicin; can discontinue cipro/flagyl  - avoid opioids, anti-cholinergics, and anti-diarrheals as these can predispose to toxic megacolon  - avoid NSAIDs   - will consider flex sig today pending scheduling availability today  - keep NPO 35 yo M with pmhx of UC comes to the ED after experiencing worsening diarrhea for the past one month with outpatient stool studies positive for C Diff and initiation of fidaxomicin. On admission, labs significant for elevated WBC and CT A/P showing UC flare. GI consulted for further management    Impression:  #UC flare  #C diff +  #bloody diarrhea     - patient with hx of UC and trialed on multiple biologics and steroid taper  - also found to be C diff + outpatient   - overall would favor presentation to be UC flare due to bloody diarrhea, as C Diff does not usually cause hematochezia    Recommendations:  - obtain GI PCR, C diff studies, fecal calprotectin, stool culture  - start solu-medrol 20 q8  - obtain colorectal surgery consult as patient has had failed multiple UC therapies  - DVT ppx as patients with UC flare are high risk for VTE   - continue with fidaxomicin; can discontinue cipro/flagyl  - f/u BCx   - avoid opioids, anti-cholinergics, and anti-diarrheals as these can predispose to toxic megacolon  - avoid NSAIDs   - will plan for flex sig on monday 35 yo M with pmhx of UC comes to the ED after experiencing worsening diarrhea for the past one month with outpatient stool studies positive for C Diff and initiation of fidaxomicin. On admission, labs significant for elevated WBC and CT A/P showing UC flare. GI consulted for further management    Impression:  #UC flare  #C diff +  #bloody diarrhea     - patient with hx of UC and trialed on multiple biologics and steroid taper  - also found to be C diff + outpatient   - overall would favor presentation to be UC flare due to bloody diarrhea, as C Diff does not usually cause hematochezia    Recommendations:  - obtain GI PCR, C diff studies, fecal calprotectin, stool culture  - start solu-medrol 20 q8  - obtain colorectal surgery consult as patient has had failed multiple UC therapies  - please obtain quant gold and hepatitis B serologies  - DVT ppx as patients with UC flare are high risk for VTE   - continue with fidaxomicin; can discontinue cipro/flagyl  - f/u BCx   - avoid opioids, anti-cholinergics, and anti-diarrheals as these can predispose to toxic megacolon  - avoid NSAIDs   - will plan for flex sig on monday

## 2024-09-27 NOTE — DISCHARGE NOTE PROVIDER - NSDCCPCAREPLAN_GEN_ALL_CORE_FT
PRINCIPAL DISCHARGE DIAGNOSIS  Diagnosis: Ulcerative colitis  Assessment and Plan of Treatment: take medication as prescribed, follow up with gastroenterologist     PRINCIPAL DISCHARGE DIAGNOSIS  Diagnosis: Ulcerative colitis  Assessment and Plan of Treatment: You received IV steroids. Please follow up with your gastroenterologist for ongoing adjustments of your long-term medication.      SECONDARY DISCHARGE DIAGNOSES  Diagnosis: Clostridium difficile diarrhea  Assessment and Plan of Treatment: You received treatment for 10 days with fidaxomicin for a Cdiff infection.     PRINCIPAL DISCHARGE DIAGNOSIS  Diagnosis: Ulcerative colitis  Assessment and Plan of Treatment: You received IV steroids and infliximab. Please follow up with your gastroenterologist for ongoing adjustments of your long-term medication.      SECONDARY DISCHARGE DIAGNOSES  Diagnosis: Clostridium difficile diarrhea  Assessment and Plan of Treatment: You received treatment for 10 days with fidaxomicin for a Cdiff infection.     PRINCIPAL DISCHARGE DIAGNOSIS  Diagnosis: Ulcerative colitis  Assessment and Plan of Treatment: You received IV steroids and infliximab. Please follow up with your gastroenterologist for ongoing adjustments of your long-term medication. Follow up with surgery      SECONDARY DISCHARGE DIAGNOSES  Diagnosis: Clostridium difficile diarrhea  Assessment and Plan of Treatment: You received treatment for 10 days with fidaxomicin for a Cdiff infection.     PRINCIPAL DISCHARGE DIAGNOSIS  Diagnosis: Ulcerative colitis  Assessment and Plan of Treatment: You received IV steroids and infliximab. You were seen by Gastroenterologist and sx, you are transferred to [facility] for surgery      SECONDARY DISCHARGE DIAGNOSES  Diagnosis: Clostridium difficile diarrhea  Assessment and Plan of Treatment: You received treatment for 10 days with fidaxomicin for a Cdiff infection.

## 2024-09-27 NOTE — CONSULT NOTE ADULT - ATTENDING COMMENTS
34 year old with ulcerative colitis   He is followed at [facility]    Recent C diff test was positive    Presents with continued diarrhea with blood in his stool    Imaging with colitis    Overall, my suspicion is higher that his acute worsening of symptoms are related to a flair of his UC.  Decision re immunosuppression per GI.     It can be difficult to differentiate colonization with C diff and superimposed C diff in patients with IBD    Can finish 10 d course of fidaxomicin. If symptoms are not improving, it was raise my concern for an IBD flair.      Supportive care  GI evaluation in progress.     I agree with stopping cipro and flagyl at this time.    QUantiferon was negative in July    Twan Montez MD  Please contact via TEAM between 8 am and 6 pm    In the evening or on weekends, can contact call service at [phone]
History/PE as noted.  Assessment/recommendations as indicated.  History per patient as well as discussion with IBD fellow at [facility].  As noted, diagnosed with ulcerative colitis while living in [place] in 2016.  Reports pancolitis at that time prompting  treatment with prednisone followed by regimen of Humira in conjunction with azathioprine.  Completed azathioprine for 3 to 4 months and then was maintained on Humira monotherapy and reports good treatment response up until approximately 2022.  [place]  and has been under the care of Professor ASIM Osullivan at the [facility] ever since.  Subsequently flared in 2022 (indicative of loss of response to Humira and not primary responder) prompting regimen of Entyvio.  Reports having done well on Entyvio for approximately 2 years with remission including endoscopic healing but flared approximately 6/2024.  Flare led to resumption of prednisone initially at 20 mg with subsequent dose escalation to 40 mg.  Initiated induction IV dose of Stelara followed by first subcutaneous maintenance dose (IBD fellow indicates he initially responded to IV induction but over past  several weeks flared).  Past 2 weeks despite prednisone 40 mg daily having approximately 8 or so mostly nocturnal episodes of bloody diarrhea in association with cramping.  No fever or weight loss.  In addition, over past week diagnosed with C. difficile disease prompting for Dificid 200 mg twice daily.  Despite completing 5 days of treatment still symptomatic with approximately 8 bloody diarrheal stools daily in conjunction with ongoing prednisone 40 mg daily.  Recently evaluated at [facility] and in view of current symptoms as well as leukocytosis (approximately 25K) he was advised to proceed for hospitalization with regimen of parenteral corticosteroids.    Per IBD fellow at [facility] plan is if he responds to parenteral corticosteroids allowing transition to oral prednisone he will then be considered for either continued therapy with Stelara or possibly switch to infliximab versus Rinvoq.  Alternatively, if he fails to respond to high-dose parenteral corticosteroids with more urgent need for escalation to a second line therapy then treatment with either infliximab or Xeljanz will be considered.  Recommendations:  - obtain GI PCR, C diff studies, fecal calprotectin, stool culture  - start solu-medrol 20 q8  - obtain colorectal surgery consult as patient has had failed multiple UC therapies  - please obtain quant gold and hepatitis B serologies  - DVT ppx as patients with UC flare are high risk for VTE   - continue with fidaxomicin; can discontinue cipro/flagyl  - f/u BCx   - avoid opioids, anti-cholinergics, and anti-diarrheals as these can predispose to toxic megacolon  - avoid NSAIDs   - will plan for flex sig on monday  (Of note, option of flexible sigmoidoscopy on 9/27 discussed with patient but he declined as he would not be able to receive sedation in view of having  eaten earlier in the day and therefore his request was to proceed with sigmoidoscopy 9/30 with option of sedation).

## 2024-09-27 NOTE — DISCHARGE NOTE PROVIDER - NSDCFUSCHEDAPPT_GEN_ALL_CORE_FT
[facility]  [facility] [address]  Scheduled Appointment: 10/11/2024

## 2024-09-27 NOTE — PATIENT PROFILE ADULT - FUNCTIONAL SCREEN CURRENT LEVEL: COMMUNICATION, MLM
0 = understands/communicates without difficulty

## 2024-09-27 NOTE — DISCHARGE NOTE PROVIDER - HOSPITAL COURSE
34M PMH ulcerative colitis following with Dr. Osullivan of [facility] presents with 8-10 episodes of bloody diarrhea during the day with further episodes of bloody diarrhea at night, advised to come in to hospital for treatment of UC flare.    Ulcerative colitis.   -pt with Hx UC presenting with ongoing flare for 3 months notable for episodes of bloody diarrhea  -follows primarily with [facility], was previously on entyvio but switched to stelara 1 month ago s/p loading dose and 1 maintenance dose  -currently on prednisone 40 daily but not ordered, may need iv steroids, ct a/p showed UC flare.  -will get GI and ID consult,   -will c/w cipro/flagyl at this time  -GI consult for evaluation of symptoms and need for IV steroids  -monitor Hgb, currently stable.    Clostridium difficile diarrhea.   ·  Plan: -pt reports he tested positive for C. diff several days prior to admission  -on fidaxomycin outpt, has been taking BID  -will repeat stool testing here - GI PCR, C.diff, stool culture  -pt. brought own dose of fidaxomycin, can administer as per pharmacy to continue   -ID consulted  -monitor electrolytes  -contact precautions  -procal wnl, blood culture pending.     Patient is a 33yo M with PMH significant for ulcerative colitis, being treated for Cdiff colitis with fidaxomicin, who presented with 8-10 episodes of bloody diarrhea daily. He was seen by GI and started on IV steroids. He was previously treated with Entyvio and switched to Stelara recently after not showing improvement. He underwent flex-sig 9/30. He is planned to complete 10d course of fidaxomicin and will be transferred to [facility] to continue receiving treatment for his acute UC flare. There is consideration being given to initiating JAK2 inhibitors such as Rinvoq.     Patient is a 35yo M with PMH significant for ulcerative colitis, being treated for Cdiff colitis with fidaxomicin, who presented with 8-10 episodes of bloody diarrhea daily. He was seen by GI and started on IV steroids. He was previously treated with Entyvio and switched to Stelara recently after not showing improvement. He is planned to complete 10d course of fidaxomicin and will be transferred to [facility] to continue receiving treatment for his acute UC flare. There is consideration being given to initiating JAK2 inhibitors such as Rinvoq.     Patient is a 33yo M with PMH significant for ulcerative colitis, being treated for Cdiff colitis with fidaxomicin, who presented with 8-10 episodes of bloody diarrhea daily. He was previously treated with Entyvio and switched to Stelara recently after not showing improvement.     The gastroenterology service evaluated the patient for ongoing severe UC flare. He was kept on IV steroids and given doses of infliximab. The GI team was in constant communication with his IBD team at [facility]. There was a transfer process initiated for [facility] transfer. There is consideration being given for initiating Rinvoq, a JAK2 inhibitor.    He also completed 10d course of fidaxomicin for Cdiff, which was initiated as an outpatient. Patient is a 33yo M with PMH significant for ulcerative colitis, being treated for Cdiff colitis with fidaxomicin, who presented with 8-10 episodes of bloody diarrhea daily. He was previously treated with Entyvio and switched to Stelara recently after not showing improvement.     The gastroenterology service evaluated the patient for ongoing severe UC flare. He was kept on IV steroids and given doses of infliximab. The GI team was in constant communication with his IBD team at [facility]. There was a transfer process initiated for [facility] transfer initially, but it was deferred. The patient was monitored for a response to infliximab. There was consideration given to Rinvoq however the use of multiple immunosuppressive medications was deemed too risky. Colorectal surgery evaluation was obtained.    He also completed 10d course of fidaxomicin for Cdiff, which was initiated as an outpatient. Patient is a 33yo M with PMH significant for ulcerative colitis, being treated for Cdiff colitis with fidaxomicin, who presented with 8-10 episodes of bloody diarrhea daily. He was previously treated with Entyvio and switched to Stelara recently after not showing improvement.     The gastroenterology service evaluated the patient for ongoing severe UC flare. He was kept on IV steroids and given doses of infliximab. The GI team was in constant communication with his IBD team at [facility]. There was a transfer process initiated for [facility] transfer initially, but it was deferred. The patient was monitored for a response to infliximab. There was consideration given to Rinvoq however the use of multiple immunosuppressive medications was deemed too risky. Colorectal surgery evaluation was obtained. Given rising labs, sx recommend sx, pt is agreeable, for transfer to [facility] for sx on Friday    He also completed 10d course of fidaxomicin for Cdiff, which was initiated as an outpatient. Patient is a 35yo M with PMH significant for ulcerative colitis, being treated for Cdiff colitis with fidaxomicin, who presented with 8-10 episodes of bloody diarrhea daily. He was previously treated with Entyvio and switched to Stelara recently after not showing improvement.     The gastroenterology service evaluated the patient for ongoing severe UC flare. He was kept on IV steroids and given doses of infliximab. The GI team was in constant communication with his IBD team at [facility]. There was a transfer process initiated for [facility] transfer initially, but it was deferred. The patient was monitored for a response to infliximab. There was consideration given to Rinvoq however the use of multiple immunosuppressive medications was deemed too risky. Colorectal surgery evaluation was obtained. Given rising labs, sx recommend sx, pt is agreeable, for transfer to [facility] for sx on Friday    He also completed 10d course of fidaxomicin for Cdiff, which was initiated as an outpatient.  stable for dc

## 2024-09-27 NOTE — DISCHARGE NOTE PROVIDER - NSDCCPTREATMENT_GEN_ALL_CORE_FT
PRINCIPAL PROCEDURE  Procedure: CT abdomen pelvis  Findings and Treatment: CT of the Abdomen and Pelvis was performed.  Sagittal and coronal reformats were performed.  FINDINGS:  LOWER CHEST: Within normal limits.  LIVER: Within normal limits.  BILE DUCTS: Normal caliber.  GALLBLADDER: Within normal limits.  SPLEEN: Within normal limits.  PANCREAS: Within normal limits.  ADRENALS: Within normal limits.  KIDNEYS/URETERS: Within normal limits.  BLADDER: Within normal limits.  REPRODUCTIVE ORGANS: Prostate is within normal limits.  BOWEL: No bowel obstruction. Appendix is normal. Diffuse mild colonic   wall thickening with loss of normal haustral markings on the left   consistent with ulcerative colitis and likely acute flare. No pneumatosis   or free air. No gross stricturing.  PERITONEUM/RETROPERITONEUM: Within normal limits.  VESSELS: Within normal limits.  LYMPH NODES: No lymphadenopathy.  ABDOMINAL WALL: Within normal limits.  BONES: Within normal limits.  IMPRESSION:  UC flare.

## 2024-09-27 NOTE — PROGRESS NOTE ADULT - SUBJECTIVE AND OBJECTIVE BOX
Patient is a 34y old  Male who presents with a chief complaint of UC flare w/ bloody diarrhea (27 Sep 2024 11:49)      SUBJECTIVE / OVERNIGHT EVENTS: Pt seen and examined at 11:10am, no overnight events, pt reports 5-6 loose stools with blood last night and once this am, denies any abdominal pain, nausea, vomiting or any other complaints, is asking for po prednisone, says that he takes 40mg daily, no other new issues reported.    MEDICATIONS  (STANDING):  ciprofloxacin   IVPB      ciprofloxacin   IVPB 400 milliGRAM(s) IV Intermittent every 12 hours  fidaxomicin 200 milliGRAM(s) Oral two times a day  influenza   Vaccine 0.5 milliLiter(s) IntraMuscular once  metroNIDAZOLE  IVPB 500 milliGRAM(s) IV Intermittent every 8 hours  sodium chloride 0.9%. 1000 milliLiter(s) (100 mL/Hr) IV Continuous <Continuous>    MEDICATIONS  (PRN):      Vital Signs Last 24 Hrs  T(C): 36.8 (27 Sep 2024 10:46), Max: 37.2 (26 Sep 2024 15:14)  T(F): 98.2 (27 Sep 2024 10:46), Max: 99 (26 Sep 2024 15:14)  HR: 105 (27 Sep 2024 10:46) (96 - 129)  BP: 133/89 (27 Sep 2024 10:46) (108/64 - 148/61)  BP(mean): --  RR: 18 (27 Sep 2024 10:46) (16 - 18)  SpO2: 98% (27 Sep 2024 10:46) (97% - 100%)    Parameters below as of 27 Sep 2024 10:46  Patient On (Oxygen Delivery Method): room air      CAPILLARY BLOOD GLUCOSE        I&O's Summary      PHYSICAL EXAM:  GENERAL: NAD  CHEST/LUNG: Clear to auscultation bilaterally; No wheeze  HEART: Regular rate and rhythm  ABDOMEN: Soft, Nontender, Nondistended  EXTREMITIES: no LE edema  PSYCH: calm  NEUROLOGY: AAOx3  SKIN: dry and warm    LABS:                        14.6   25.35 )-----------( 382      ( 27 Sep 2024 05:15 )             43.4     09-27    133[L]  |  98  |  6[L]  ----------------------------<  92  3.8   |  24  |  0.89    Ca    8.0[L]      27 Sep 2024 05:15  Phos  3.7     09-27  Mg     2.20     09-27    TPro  6.1  /  Alb  3.1[L]  /  TBili  0.3  /  DBili  x   /  AST  28  /  ALT  31  /  AlkPhos  57  09-27    PT/INR - ( 26 Sep 2024 18:35 )   PT: 12.9 sec;   INR: 1.11 ratio         PTT - ( 26 Sep 2024 18:35 )  PTT:28.3 sec      Urinalysis Basic - ( 27 Sep 2024 05:15 )    Color: x / Appearance: x / SG: x / pH: x  Gluc: 92 mg/dL / Ketone: x  / Bili: x / Urobili: x   Blood: x / Protein: x / Nitrite: x   Leuk Esterase: x / RBC: x / WBC x   Sq Epi: x / Non Sq Epi: x / Bacteria: x        RADIOLOGY & ADDITIONAL TESTS:    Imaging Personally Reviewed:    Consultant(s) Notes Reviewed:      Care Discussed with Consultants/Other Providers:   Patient is a 34y old  Male who presents with a chief complaint of UC flare w/ bloody diarrhea (27 Sep 2024 11:49)      SUBJECTIVE / OVERNIGHT EVENTS: Pt seen and examined at 11:10am, no overnight events, pt reports 5-6 loose stools with blood last night and once this am, denies any abdominal pain, nausea, vomiting or any other complaints, is asking for po prednisone, says that he takes 40mg daily, no other new issues reported.    MEDICATIONS  (STANDING):  ciprofloxacin   IVPB      ciprofloxacin   IVPB 400 milliGRAM(s) IV Intermittent every 12 hours  fidaxomicin 200 milliGRAM(s) Oral two times a day  influenza   Vaccine 0.5 milliLiter(s) IntraMuscular once  metroNIDAZOLE  IVPB 500 milliGRAM(s) IV Intermittent every 8 hours  sodium chloride 0.9%. 1000 milliLiter(s) (100 mL/Hr) IV Continuous <Continuous>    MEDICATIONS  (PRN):      Vital Signs Last 24 Hrs  T(C): 36.8 (27 Sep 2024 10:46), Max: 37.2 (26 Sep 2024 15:14)  T(F): 98.2 (27 Sep 2024 10:46), Max: 99 (26 Sep 2024 15:14)  HR: 105 (27 Sep 2024 10:46) (96 - 129)  BP: 133/89 (27 Sep 2024 10:46) (108/64 - 148/61)  BP(mean): --  RR: 18 (27 Sep 2024 10:46) (16 - 18)  SpO2: 98% (27 Sep 2024 10:46) (97% - 100%)    Parameters below as of 27 Sep 2024 10:46  Patient On (Oxygen Delivery Method): room air      CAPILLARY BLOOD GLUCOSE        I&O's Summary      PHYSICAL EXAM:  GENERAL: NAD  CHEST/LUNG: Clear to auscultation bilaterally; No wheeze  HEART: Regular rate and rhythm  ABDOMEN: Soft, Nontender, Nondistended  EXTREMITIES: no LE edema  PSYCH: calm  NEUROLOGY: AAOx3  SKIN: dry and warm    LABS:                        14.6   25.35 )-----------( 382      ( 27 Sep 2024 05:15 )             43.4     09-27    133[L]  |  98  |  6[L]  ----------------------------<  92  3.8   |  24  |  0.89    Ca    8.0[L]      27 Sep 2024 05:15  Phos  3.7     09-27  Mg     2.20     09-27    TPro  6.1  /  Alb  3.1[L]  /  TBili  0.3  /  DBili  x   /  AST  28  /  ALT  31  /  AlkPhos  57  09-27    PT/INR - ( 26 Sep 2024 18:35 )   PT: 12.9 sec;   INR: 1.11 ratio         PTT - ( 26 Sep 2024 18:35 )  PTT:28.3 sec      Urinalysis Basic - ( 27 Sep 2024 05:15 )    Color: x / Appearance: x / SG: x / pH: x  Gluc: 92 mg/dL / Ketone: x  / Bili: x / Urobili: x   Blood: x / Protein: x / Nitrite: x   Leuk Esterase: x / RBC: x / WBC x   Sq Epi: x / Non Sq Epi: x / Bacteria: x        RADIOLOGY & ADDITIONAL TESTS:  < from: CT Abdomen and Pelvis w/ IV Cont (09.26.24 @ 21:15) >  CT ABDOMEN AND PELVIS IC   ORDERED BY: SHAWNEE FULLER     < end of copied text >  < from: CT Abdomen and Pelvis w/ IV Cont (09.26.24 @ 21:15) >  UC flare.      < end of copied text >    Imaging Personally Reviewed:    Consultant(s) Notes Reviewed:      Care Discussed with Consultants/Other Providers:

## 2024-09-27 NOTE — PROGRESS NOTE ADULT - PROBLEM SELECTOR PLAN 3
Diet: Regular  DVT: holding i/s/o bloody diarrhea  Dispo: Pending, likely home    Plan discussed with ACP Diet: Regular  DVT: holding i/s/o bloody diarrhea  Dispo: Pending, likely home  f/u GI and ID recs    Plan discussed with ACP

## 2024-09-27 NOTE — CONSULT NOTE ADULT - ASSESSMENT
Impression/Hospital Course: 34M medical history of ulcerative colitis  presents with 8-10 episodes of bloody diarrhea concerning for UC flare. He reports his symptoms have been ongoing for appx. 3 months and have been managed outpatient with his [facility] UC team. Initially was tapering down steroids from 40 daily down to 15, however due to recent flare was increased back to 40mg daily. He was previously on Entyvio for maintenence therapy but did not show improvement with symptoms and was switched appx. 1 month ago to Stelara - notes he received loading dose and 1 maintenence dose with some improvement, noting that after taking AM dose of prednisone his stool frequency improved and were less bloody throughout the day. Of note he was diagnosed with C.diff on Sunday and was given fidaxomycin - has been taking for past 3 days BID.      WBC 28.95-->25.35  CDiff +  CT abdomen and pelvis with Diffuse mild colonic wall thickening with loss of normal haustral markings on the left consistent with ulcerative colitis and likely acute flare. No pneumatosis   or free air. No gross stricturing.    Antimicrobials:  - Fidaxomicin 200mg po Q12H (9/22-  - Ciprofloxacin 400mg IV Q12H (9/26-  - Flagyl 500mg IV Q8H (9/26-    Assessment:  - C.diff   - UC flare    Recommendations: PLEASE DEFER ALL CHANGES IN PLAN UNTIL SIGNED BY ATTENDING. All recommendations are tentative pending Attending Attestation.    Shasha Tarango DO, PGY-4  Infectious Disease Fellow  [employer] Preferred  After 5pm/weekends call [phone]   Impression/Hospital Course: 34M medical history of ulcerative colitis  presents with 8-10 episodes of bloody diarrhea concerning for UC flare. He reports his symptoms have been ongoing for appx. 3 months and have been managed outpatient with his [facility] UC team. Initially was tapering down steroids from 40 daily down to 15, however due to recent flare was increased back to 40mg daily. He was diagnosed with C.diff on Sunday and was given fidaxomycin and has been taking for past 5 days. He is afebrile   WBC 28.95-->25.35, CDiff +. CT abdomen and pelvis with Diffuse mild colonic wall thickening with loss of normal haustral markings on the left consistent with ulcerative colitis and likely acute flare. ID consulted for further recommendation.    Antimicrobials:  - Fidaxomicin 200mg po Q12H (9/22-  - Ciprofloxacin 400mg IV Q12H (9/26-  - Flagyl 500mg IV Q8H (9/26-    Assessment:  - C.diff   - UC flare    Recommendations:  - Continue with Fidaxomicin, complete 10 day course  - Discontinue ciprofloxacin and flagyll   - Management if UC flare per GI team    Case discussed with attending. Recs made to the primary team.     Shasha Tarango DO, PGY-4  Infectious Disease Fellow  [employer] Teams Preferred  After 5pm/weekends call [phone]   Impression/Hospital Course: 34M medical history of ulcerative colitis  presents with 8-10 episodes of bloody diarrhea concerning for UC flare. He reports his symptoms have been ongoing for appx. 3 months and have been managed outpatient with his [facility] UC team. Initially was tapering down steroids from 40 daily down to 15, however due to recent flare was increased back to 40mg daily. He was diagnosed with C.diff on Sunday and was given fidaxomycin and has been taking for past 5 days. He is afebrile. WBC 28.95-->25.35, CDiff +. CT abdomen and pelvis with Diffuse mild colonic wall thickening with loss of normal haustral markings on the left consistent with ulcerative colitis and likely acute flare. ID consulted for further recommendation.    Antimicrobials:  - Fidaxomicin 200mg po Q12H (9/22-  - Ciprofloxacin 400mg IV Q12H (9/26-  - Flagyl 500mg IV Q8H (9/26-    Assessment:  - C.diff   - UC flare    Recommendations:  - Continue with Fidaxomicin, complete 10 day course  - Discontinue ciprofloxacin and flagyll   - Management if UC flare per GI team    Case discussed with attending. Recs made to the primary team.     Shasha Tarango DO, PGY-4  Infectious Disease Fellow  [employer] Teams Preferred  After 5pm/weekends call [phone]

## 2024-09-27 NOTE — DISCHARGE NOTE PROVIDER - CARE PROVIDER_API CALL
Primary care provider,   Phone: (   )    -  Fax: (   )    -  Follow Up Time:    Primary care provider,   Phone: (   )    -  Fax: (   )    -  Follow Up Time:     Karli Lu  Colon/Rectal Surgery  [address] [place] [id number]  Phone: [phone]  Fax: [phone]  Follow Up Time:

## 2024-09-27 NOTE — DISCHARGE NOTE PROVIDER - CARE PROVIDERS DIRECT ADDRESSES
,DirectAddress_Unknown ,DirectAddress_Unknown,[email or web address]

## 2024-09-27 NOTE — PATIENT PROFILE ADULT - FALL HARM RISK - HARM RISK INTERVENTIONS

## 2024-09-27 NOTE — CONSULT NOTE ADULT - SUBJECTIVE AND OBJECTIVE BOX
35 yo M with pmhx of UC comes to the ED after experiencing worsening diarrhea for the past one month. In late 2015, the patient was experiencing diarrhea with specks of blood. In the following months, the patient underwent a CT scan that showed pancolitis and he also underwent a colonoscopy in 2016 in [place] after which he was found to have UC. He was started on prednisone at the time for UC flare and also humira for maintenance therapy. [place] then moved to [place] in 2022 and established care with [facility]. His UC symptoms were worsening (bloody diarrhea) and was transitioned from humira to entyvio. His symptoms improved and he was at his baseline bowel movements (3 episodes per day, non-bloody). In 5/24 he underwent a colonoscopy and his Leahy Ulcerative Endoscopic Score of Severity was 0. However, in 6/24, his UC symptoms began worsening and he was experiencing bloody diarrhea. He was switched from entyvio to stelara and was also started on a prednisone taper (starting 40mg). His symptoms initially improved and and he was continued on his taper (down to 15mg), but his symptoms began worsening and his prednisone was increased to 40mg. His symptoms persisted and he was recommended to obtain a stool test outpatient the week prior to coming into the hospital which was positive for C diff. He was then started on fidaxomicin outpatient and recommended to come to the hospital for iv steroids and colonoscopy. However, as per ED documentation, ED team spoke with [facility] team and recommended against pursuing colonoscopy. Overall, the patient has experienced two episodes of UC flare (one when diagnosed and second this admission).     Today, the patient is feeling slightly improved from his symptoms last week. He did have one episode of NBNB vomiting 2 days before presenting to the hospital and his diarrhea has progressively worsened. He started from his baseline bowel habits of 3 formed stools per day, to now 8-10 episodes of watery and bloody diarrhea. He has not had any episodes of vomiting, abdominal pain, nor nausea. Lastly, patient's hospital course has been significant for continuation of fidaxomicin and initiation of IVF, cipro, and flagyl.       T(C): 36.3 (09-27-24 @ 05:05), Max: 37.2 (09-26-24 @ 15:14)  T(F): 97.4 (09-27-24 @ 05:05), Max: 99 (09-26-24 @ 15:14)  HR: 102 (09-27-24 @ 05:05) (96 - 129)  BP: 148/61 (09-27-24 @ 05:05) (108/64 - 148/61)  ABP: --  ABP(mean): --  RR: 18 (09-27-24 @ 05:05) (16 - 18)  SpO2: 99% (09-27-24 @ 05:05) (97% - 100%)    LOS: 1d    VITALS:   T(C): 36.3 (09-27-24 @ 05:05), Max: 37.2 (09-26-24 @ 15:14)  HR: 102 (09-27-24 @ 05:05) (96 - 129)  BP: 148/61 (09-27-24 @ 05:05) (108/64 - 148/61)  RR: 18 (09-27-24 @ 05:05) (16 - 18)  SpO2: 99% (09-27-24 @ 05:05) (97% - 100%)    GENERAL: NAD, lying in bed comfortably  HEAD:  Atraumatic, Normocephalic  EYES: EOMI, PERRLA, conjunctiva and sclera clear  ENT: Dry mucous membranes  NECK: Supple, No JVD  CHEST/LUNG: Clear to auscultation bilaterally; No rales, rhonchi, wheezing, or rubs. Unlabored respirations  HEART: Regular rate and rhythm; No murmurs, rubs, or gallops  ABDOMEN: BSx4; Soft, nontender, nondistended  EXTREMITIES:  2+ Peripheral Pulses, brisk capillary refill. No clubbing, cyanosis, or edema  NERVOUS SYSTEM:  A&Ox3, no focal deficits   SKIN: No rashes or lesions                          14.6   25.35 )-----------( 382      ( 27 Sep 2024 05:15 )             43.4       09-27    133[L]  |  98  |  6[L]  ----------------------------<  92  3.8   |  24  |  0.89    Ca    8.0[L]      27 Sep 2024 05:15  Phos  3.7     09-27  Mg     2.20     09-27    TPro  6.1  /  Alb  3.1[L]  /  TBili  0.3  /  DBili  x   /  AST  28  /  ALT  31  /  AlkPhos  57  09-27      PT/INR - ( 26 Sep 2024 18:35 )   PT: 12.9 sec;   INR: 1.11 ratio         PTT - ( 26 Sep 2024 18:35 )  PTT:28.3 sec    FINDINGS:  LOWER CHEST: Within normal limits.    LIVER: Within normal limits.  BILE DUCTS: Normal caliber.  GALLBLADDER: Within normal limits.  SPLEEN: Within normal limits.  PANCREAS: Within normal limits.  ADRENALS: Within normal limits.  KIDNEYS/URETERS: Within normal limits.    BLADDER: Within normal limits.  REPRODUCTIVE ORGANS: Prostate is within normal limits.    BOWEL: No bowel obstruction. Appendix is normal. Diffuse mild colonic   wall thickening with loss of normal haustral markings on the left   consistent with ulcerative colitis and likely acute flare. No pneumatosis   or free air. No gross stricturing.  PERITONEUM/RETROPERITONEUM: Within normal limits.  VESSELS: Within normal limits.  LYMPH NODES: No lymphadenopathy.  ABDOMINAL WALL: Within normal limits.  BONES: Within normal limits.    IMPRESSION:  UC flare.

## 2024-09-28 LAB
ANION GAP SERPL CALC-SCNC: 12 MMOL/L — SIGNIFICANT CHANGE UP (ref 7–14)
BASOPHILS # BLD AUTO: 0.09 K/UL — SIGNIFICANT CHANGE UP (ref 0–0.2)
BASOPHILS NFR BLD AUTO: 0.4 % — SIGNIFICANT CHANGE UP (ref 0–2)
BUN SERPL-MCNC: 4 MG/DL — LOW (ref 7–23)
CALCIUM SERPL-MCNC: 8.2 MG/DL — LOW (ref 8.4–10.5)
CHLORIDE SERPL-SCNC: 99 MMOL/L — SIGNIFICANT CHANGE UP (ref 98–107)
CO2 SERPL-SCNC: 23 MMOL/L — SIGNIFICANT CHANGE UP (ref 22–31)
CREAT SERPL-MCNC: 0.79 MG/DL — SIGNIFICANT CHANGE UP (ref 0.5–1.3)
EGFR: 121 ML/MIN/1.73M2 — SIGNIFICANT CHANGE UP
EOSINOPHIL # BLD AUTO: 0.1 K/UL — SIGNIFICANT CHANGE UP (ref 0–0.5)
EOSINOPHIL NFR BLD AUTO: 0.4 % — SIGNIFICANT CHANGE UP (ref 0–6)
GLUCOSE SERPL-MCNC: 87 MG/DL — SIGNIFICANT CHANGE UP (ref 70–99)
HAV IGM SER-ACNC: SIGNIFICANT CHANGE UP
HBV CORE IGM SER-ACNC: SIGNIFICANT CHANGE UP
HBV SURFACE AB SER-ACNC: 19 MIU/ML — SIGNIFICANT CHANGE UP
HBV SURFACE AB SER-ACNC: REACTIVE
HBV SURFACE AG SER-ACNC: SIGNIFICANT CHANGE UP
HBV SURFACE AG SER-ACNC: SIGNIFICANT CHANGE UP
HCT VFR BLD CALC: 42.6 % — SIGNIFICANT CHANGE UP (ref 39–50)
HCV AB S/CO SERPL IA: 0.12 S/CO — SIGNIFICANT CHANGE UP (ref 0–0.99)
HCV AB SERPL-IMP: SIGNIFICANT CHANGE UP
HGB BLD-MCNC: 14.3 G/DL — SIGNIFICANT CHANGE UP (ref 13–17)
IANC: 19.69 K/UL — HIGH (ref 1.8–7.4)
IMM GRANULOCYTES NFR BLD AUTO: 0.8 % — SIGNIFICANT CHANGE UP (ref 0–0.9)
LYMPHOCYTES # BLD AUTO: 14.4 % — SIGNIFICANT CHANGE UP (ref 13–44)
LYMPHOCYTES # BLD AUTO: 3.59 K/UL — HIGH (ref 1–3.3)
MAGNESIUM SERPL-MCNC: 2.2 MG/DL — SIGNIFICANT CHANGE UP (ref 1.6–2.6)
MCHC RBC-ENTMCNC: 27.5 PG — SIGNIFICANT CHANGE UP (ref 27–34)
MCHC RBC-ENTMCNC: 33.6 GM/DL — SIGNIFICANT CHANGE UP (ref 32–36)
MCV RBC AUTO: 81.9 FL — SIGNIFICANT CHANGE UP (ref 80–100)
MONOCYTES # BLD AUTO: 1.31 K/UL — HIGH (ref 0–0.9)
MONOCYTES NFR BLD AUTO: 5.2 % — SIGNIFICANT CHANGE UP (ref 2–14)
NEUTROPHILS # BLD AUTO: 19.69 K/UL — HIGH (ref 1.8–7.4)
NEUTROPHILS NFR BLD AUTO: 78.8 % — HIGH (ref 43–77)
NRBC # BLD: 0 /100 WBCS — SIGNIFICANT CHANGE UP (ref 0–0)
NRBC # FLD: 0 K/UL — SIGNIFICANT CHANGE UP (ref 0–0)
PHOSPHATE SERPL-MCNC: 4.6 MG/DL — HIGH (ref 2.5–4.5)
PLATELET # BLD AUTO: 376 K/UL — SIGNIFICANT CHANGE UP (ref 150–400)
POTASSIUM SERPL-MCNC: 4.3 MMOL/L — SIGNIFICANT CHANGE UP (ref 3.5–5.3)
POTASSIUM SERPL-SCNC: 4.3 MMOL/L — SIGNIFICANT CHANGE UP (ref 3.5–5.3)
RBC # BLD: 5.2 M/UL — SIGNIFICANT CHANGE UP (ref 4.2–5.8)
RBC # FLD: 13.1 % — SIGNIFICANT CHANGE UP (ref 10.3–14.5)
SODIUM SERPL-SCNC: 134 MMOL/L — LOW (ref 135–145)
WBC # BLD: 24.99 K/UL — HIGH (ref 3.8–10.5)
WBC # FLD AUTO: 24.99 K/UL — HIGH (ref 3.8–10.5)

## 2024-09-28 PROCEDURE — 99232 SBSQ HOSP IP/OBS MODERATE 35: CPT

## 2024-09-28 PROCEDURE — 99232 SBSQ HOSP IP/OBS MODERATE 35: CPT | Mod: GC

## 2024-09-28 RX ORDER — ENOXAPARIN SODIUM 150 MG/ML
40 INJECTION SUBCUTANEOUS EVERY 24 HOURS
Refills: 0 | Status: DISCONTINUED | OUTPATIENT
Start: 2024-09-28 | End: 2024-10-10

## 2024-09-28 RX ORDER — PANTOPRAZOLE SODIUM 40 MG/1
40 TABLET, DELAYED RELEASE ORAL
Refills: 0 | Status: DISCONTINUED | OUTPATIENT
Start: 2024-09-28 | End: 2024-10-10

## 2024-09-28 RX ADMIN — FIDAXOMICIN 200 MILLIGRAM(S): 200 GRANULE, FOR SUSPENSION ORAL at 05:38

## 2024-09-28 RX ADMIN — METHYLPREDNISOLONE ACETATE 20 MILLIGRAM(S): 80 INJECTION, SUSPENSION INTRA-ARTICULAR; INTRALESIONAL; INTRAMUSCULAR; SOFT TISSUE at 09:37

## 2024-09-28 RX ADMIN — METHYLPREDNISOLONE ACETATE 20 MILLIGRAM(S): 80 INJECTION, SUSPENSION INTRA-ARTICULAR; INTRALESIONAL; INTRAMUSCULAR; SOFT TISSUE at 00:28

## 2024-09-28 RX ADMIN — METHYLPREDNISOLONE ACETATE 20 MILLIGRAM(S): 80 INJECTION, SUSPENSION INTRA-ARTICULAR; INTRALESIONAL; INTRAMUSCULAR; SOFT TISSUE at 17:33

## 2024-09-28 RX ADMIN — ENOXAPARIN SODIUM 40 MILLIGRAM(S): 150 INJECTION SUBCUTANEOUS at 17:34

## 2024-09-28 RX ADMIN — FIDAXOMICIN 200 MILLIGRAM(S): 200 GRANULE, FOR SUSPENSION ORAL at 17:34

## 2024-09-28 NOTE — PROGRESS NOTE ADULT - ASSESSMENT
34M PMH ulcerative colitis following with Dr. Osullivan of [facility] presents with 8-10 episodes of bloody diarrhea during the day with further episodes of bloody diarrhea at night, advised to come in to hospital for treatment of UC flare.

## 2024-09-28 NOTE — PROGRESS NOTE ADULT - PROBLEM SELECTOR PLAN 2
-pt reports he tested positive for C. diff several days prior to admission  -on fidaxomycin outpt, has been taking BID  -will repeat stool testing here - GI PCR, C.diff, stool culture  -pt. brought own dose of fidaxomycin, can administer as per pharmacy to continue   -ID rec is noted   -monitor electrolytes  -contact precautions  -procal wnl, blood culture pending -pt reports he tested positive for C. diff several days prior to admission  -on fidaxomycin outpt, has been taking BID  -POs C.diff,  Neg GI PCR, F/up stool culture and stool calpropectin  -pt. brought own dose of fidaxomycin, can administer as per pharmacy to continue  as reported   -ID rec is noted   -monitor electrolytes  -contact precautions  -procal wnl, blood culturre is neg so far

## 2024-09-28 NOTE — PROGRESS NOTE ADULT - SUBJECTIVE AND OBJECTIVE BOX
Patient is a 34y old  Male who presents with a chief complaint of UC flare w/ bloody diarrhea (28 Sep 2024 07:39)      SUBJECTIVE / OVERNIGHT EVENTS:    Tele reviewed:       ADDITIONAL REVIEW OF SYSTEMS: Negative except for above    MEDICATIONS  (STANDING):  enoxaparin Injectable 40 milliGRAM(s) SubCutaneous every 24 hours  fidaxomicin 200 milliGRAM(s) Oral two times a day  influenza   Vaccine 0.5 milliLiter(s) IntraMuscular once  methylPREDNISolone sodium succinate Injectable 20 milliGRAM(s) IV Push every 8 hours  sodium chloride 0.9%. 1000 milliLiter(s) (100 mL/Hr) IV Continuous <Continuous>    MEDICATIONS  (PRN):      CAPILLARY BLOOD GLUCOSE        I&O's Summary      PHYSICAL EXAM:  Vital Signs Last 24 Hrs  T(C): 36.5 (28 Sep 2024 11:58), Max: 36.7 (28 Sep 2024 09:45)  T(F): 97.7 (28 Sep 2024 11:58), Max: 98.1 (28 Sep 2024 09:45)  HR: 99 (28 Sep 2024 09:45) (95 - 99)  BP: 121/83 (28 Sep 2024 11:58) (121/83 - 139/90)  BP(mean): --  RR: 18 (28 Sep 2024 11:58) (18 - 18)  SpO2: 100% (28 Sep 2024 11:58) (97% - 100%)    Parameters below as of 28 Sep 2024 09:45  Patient On (Oxygen Delivery Method): room air        PHYSICAL EXAM:  GENERAL: NAD, well-developed  HEAD:  Atraumatic, Normocephalic  EYES:  conjunctiva and sclera clear  NECK: Supple, No JVD  CHEST/LUNG: Clear to auscultation bilaterally; No wheeze  HEART: Regular rate and rhythm; No murmurs, rubs, or gallops  ABDOMEN: Soft, Nontender, Nondistended; Bowel sounds present  EXTREMITIES:  2+ Peripheral Pulses, No clubbing, cyanosis, or edema  PSYCH: AAOx3  NEUROLOGY: non-focal  SKIN: No rashes or lesions      LABS:                        14.3   24.99 )-----------( 376      ( 28 Sep 2024 06:09 )             42.6     09-28    134[L]  |  99  |  4[L]  ----------------------------<  87  4.3   |  23  |  0.79    Ca    8.2[L]      28 Sep 2024 06:09  Phos  4.6     09-28  Mg     2.20     09-28    TPro  6.1  /  Alb  3.1[L]  /  TBili  0.3  /  DBili  x   /  AST  28  /  ALT  31  /  AlkPhos  57  09-27          Urinalysis Basic - ( 28 Sep 2024 06:09 )    Color: x / Appearance: x / SG: x / pH: x  Gluc: 87 mg/dL / Ketone: x  / Bili: x / Urobili: x   Blood: x / Protein: x / Nitrite: x   Leuk Esterase: x / RBC: x / WBC x   Sq Epi: x / Non Sq Epi: x / Bacteria: x        Culture - Blood (collected 26 Sep 2024 18:48)  Source: .Blood BLOOD  Preliminary Report (28 Sep 2024 01:02):    No growth at 24 hours    Culture - Blood (collected 26 Sep 2024 18:35)  Source: .Blood BLOOD  Preliminary Report (28 Sep 2024 01:02):    No growth at 24 hours        RADIOLOGY & ADDITIONAL TESTS:    Imaging Personally Reviewed:    Electrocardiogram Personally Reviewed:    COORDINATION OF CARE:  Care Discussed with Consultants/Other Providers [Y/N]:  Prior or Outpatient Records Reviewed [Y/N]:     Patient is a 34y old  Male who presents with a chief complaint of UC flare w/ bloody diarrhea (28 Sep 2024 07:39)      SUBJECTIVE / OVERNIGHT EVENTS:  Pt was seen with his wife and child at the bedside .  Pt states to still have diarrhea but with less blood and stool is forming and no fever       MEDICATIONS  (STANDING):  enoxaparin Injectable 40 milliGRAM(s) SubCutaneous every 24 hours  fidaxomicin 200 milliGRAM(s) Oral two times a day  influenza   Vaccine 0.5 milliLiter(s) IntraMuscular once  methylPREDNISolone sodium succinate Injectable 20 milliGRAM(s) IV Push every 8 hours  sodium chloride 0.9%. 1000 milliLiter(s) (100 mL/Hr) IV Continuous <Continuous>    MEDICATIONS  (PRN):      CAPILLARY BLOOD GLUCOSE        I&O's Summary      PHYSICAL EXAM:  Vital Signs Last 24 Hrs  T(C): 36.5 (28 Sep 2024 11:58), Max: 36.7 (28 Sep 2024 09:45)  T(F): 97.7 (28 Sep 2024 11:58), Max: 98.1 (28 Sep 2024 09:45)  HR: 99 (28 Sep 2024 09:45) (95 - 99)  BP: 121/83 (28 Sep 2024 11:58) (121/83 - 139/90)  BP(mean): --  RR: 18 (28 Sep 2024 11:58) (18 - 18)  SpO2: 100% (28 Sep 2024 11:58) (97% - 100%)    Parameters below as of 28 Sep 2024 09:45  Patient On (Oxygen Delivery Method): room air        PHYSICAL EXAM:  GENERAL: NAD, well-developed  HEAD:  Atraumatic, Normocephalic  EYES:  conjunctiva and sclera clear  NECK: Supple, No JVD  CHEST/LUNG: Clear to auscultation bilaterally; No wheeze  HEART: Regular rate and rhythm; No murmurs, rubs, or gallops  ABDOMEN: Soft, Nontender, Nondistended; Bowel sounds present  PSYCH: AAOx3        LABS:                        14.3   24.99 )-----------( 376      ( 28 Sep 2024 06:09 )             42.6     09-28    134[L]  |  99  |  4[L]  ----------------------------<  87  4.3   |  23  |  0.79    Ca    8.2[L]      28 Sep 2024 06:09  Phos  4.6     09-28  Mg     2.20     09-28    TPro  6.1  /  Alb  3.1[L]  /  TBili  0.3  /  DBili  x   /  AST  28  /  ALT  31  /  AlkPhos  57  09-27          Urinalysis Basic - ( 28 Sep 2024 06:09 )    Color: x / Appearance: x / SG: x / pH: x  Gluc: 87 mg/dL / Ketone: x  / Bili: x / Urobili: x   Blood: x / Protein: x / Nitrite: x   Leuk Esterase: x / RBC: x / WBC x   Sq Epi: x / Non Sq Epi: x / Bacteria: x        Culture - Blood (collected 26 Sep 2024 18:48)  Source: .Blood BLOOD  Preliminary Report (28 Sep 2024 01:02):    No growth at 24 hours    Culture - Blood (collected 26 Sep 2024 18:35)  Source: .Blood BLOOD  Preliminary Report (28 Sep 2024 01:02):    No growth at 24 hours        RADIOLOGY & ADDITIONAL TESTS:    Imaging Personally Reviewed:    Electrocardiogram Personally Reviewed:    COORDINATION OF CARE:  Care Discussed with Consultants/Other Providers [Y/N]:  Prior or Outpatient Records Reviewed [Y/N]:

## 2024-09-28 NOTE — PROGRESS NOTE ADULT - PROBLEM SELECTOR PLAN 1
-pt with Hx UC presenting with ongoing flare for 3 months notable for episodes of bloody diarrhea  -follows primarily with [facility], was previously on entyvio but switched to stelara 1 month ago s/p loading dose and 1 maintenance dose  - cw with IV steroid   - GI and ID consult rec is appreciated   -will c/w cipro/flagyl at this time  -GI consult rec is noted   -monitor Hgb, currently stable -pt with Hx UC presenting with ongoing flare for 3 months notable for episodes of bloody diarrhea--> improving now   -follows primarily with [facility], was previously on entyvio but switched to stelara 1 month ago s/p loading dose and 1 maintenance dose  - cw with IV steroid , start PPI, monitor glucose   - GI and ID consult rec is appreciated   - cipro/flagyl  was DC   -GI consult rec is noted   -monitor Hgb, currently stable

## 2024-09-28 NOTE — PROGRESS NOTE ADULT - NSPROGADDITIONALINFOA_GEN_ALL_CORE
will consult colorectal surgery team in AM as per GI rec       [facility] will consult colorectal surgery team in AM as per GI rec       Alayna Moreno   Hospitalist  Available on TEAMS

## 2024-09-28 NOTE — PROGRESS NOTE ADULT - SUBJECTIVE AND OBJECTIVE BOX
Progress Note   Berkley Ochoa PGY4- GI/Hep    SUBJECTIVE: Patient seen and examined at bedside.     OBJECTIVE:    VITAL SIGNS:  ICU Vital Signs Last 24 Hrs  T(C): 36.3 (28 Sep 2024 06:17), Max: 36.9 (27 Sep 2024 17:40)  T(F): 97.3 (28 Sep 2024 06:17), Max: 98.4 (27 Sep 2024 17:40)  HR: 95 (28 Sep 2024 06:17) (95 - 105)  BP: 135/87 (28 Sep 2024 06:17) (125/88 - 146/98)  BP(mean): --  ABP: --  ABP(mean): --  RR: 18 (28 Sep 2024 06:17) (18 - 18)  SpO2: 97% (28 Sep 2024 06:17) (97% - 100%)    O2 Parameters below as of 28 Sep 2024 06:17  Patient On (Oxygen Delivery Method): room air                PHYSICAL EXAM:    General: NAD  HEENT: NC/AT  Neck: supple  Respiratory: Regular RR, no increase in WOB  Cardiovascular: RRR  Abdomen: soft, NT/ND; +BS x4  Extremities: WWP, 2+ peripheral pulses b/l  Skin: normal color and turgor; no rash  Neurological: A&OX    MEDICATIONS:  MEDICATIONS  (STANDING):  fidaxomicin 200 milliGRAM(s) Oral two times a day  influenza   Vaccine 0.5 milliLiter(s) IntraMuscular once  methylPREDNISolone sodium succinate Injectable 20 milliGRAM(s) IV Push every 8 hours  sodium chloride 0.9%. 1000 milliLiter(s) (100 mL/Hr) IV Continuous <Continuous>    MEDICATIONS  (PRN):      ALLERGIES:  Allergies    No Known Allergies    Intolerances    mesalamine (Other (Mod to Severe))      LABS:                        14.6   25.35 )-----------( 382      ( 27 Sep 2024 05:15 )             43.4     09-27    133[L]  |  98  |  6[L]  ----------------------------<  92  3.8   |  24  |  0.89    Ca    8.0[L]      27 Sep 2024 05:15  Phos  3.7     09-27  Mg     2.20     09-27    TPro  6.1  /  Alb  3.1[L]  /  TBili  0.3  /  DBili  x   /  AST  28  /  ALT  31  /  AlkPhos  57  09-27    PT/INR - ( 26 Sep 2024 18:35 )   PT: 12.9 sec;   INR: 1.11 ratio         PTT - ( 26 Sep 2024 18:35 )  PTT:28.3 sec  Urinalysis Basic - ( 27 Sep 2024 05:15 )    Color: x / Appearance: x / SG: x / pH: x  Gluc: 92 mg/dL / Ketone: x  / Bili: x / Urobili: x   Blood: x / Protein: x / Nitrite: x   Leuk Esterase: x / RBC: x / WBC x   Sq Epi: x / Non Sq Epi: x / Bacteria: x         Progress Note   Berkley Vaughan- PGY4- GI/Hep    SUBJECTIVE: Patient seen and examined at bedside.   - feels a little better today  - still had about 10 BMs but not every one had blood in it like before  - some stool was a little more formed (not as watery)  - less abd pain and cramping    OBJECTIVE:    VITAL SIGNS:  ICU Vital Signs Last 24 Hrs  T(C): 36.3 (28 Sep 2024 06:17), Max: 36.9 (27 Sep 2024 17:40)  T(F): 97.3 (28 Sep 2024 06:17), Max: 98.4 (27 Sep 2024 17:40)  HR: 95 (28 Sep 2024 06:17) (95 - 105)  BP: 135/87 (28 Sep 2024 06:17) (125/88 - 146/98)  BP(mean): --  ABP: --  ABP(mean): --  RR: 18 (28 Sep 2024 06:17) (18 - 18)  SpO2: 97% (28 Sep 2024 06:17) (97% - 100%)    O2 Parameters below as of 28 Sep 2024 06:17  Patient On (Oxygen Delivery Method): room air      PHYSICAL EXAM:    General: NAD  HEENT: NC/AT  Neck: supple  Respiratory: Regular RR, no increase in WOB  Cardiovascular: RRR  Abdomen: soft, NT/ND; +BS x4  Extremities: WWP, 2+ peripheral pulses b/l  Skin: normal color and turgor; no rash  Neurological: A&OX3    MEDICATIONS:  MEDICATIONS  (STANDING):  fidaxomicin 200 milliGRAM(s) Oral two times a day  influenza   Vaccine 0.5 milliLiter(s) IntraMuscular once  methylPREDNISolone sodium succinate Injectable 20 milliGRAM(s) IV Push every 8 hours  sodium chloride 0.9%. 1000 milliLiter(s) (100 mL/Hr) IV Continuous <Continuous>    MEDICATIONS  (PRN):      ALLERGIES:  Allergies    No Known Allergies    Intolerances    mesalamine (Other (Mod to Severe))      LABS:                        14.6   25.35 )-----------( 382      ( 27 Sep 2024 05:15 )             43.4     09-27    133[L]  |  98  |  6[L]  ----------------------------<  92  3.8   |  24  |  0.89    Ca    8.0[L]      27 Sep 2024 05:15  Phos  3.7     09-27  Mg     2.20     09-27    TPro  6.1  /  Alb  3.1[L]  /  TBili  0.3  /  DBili  x   /  AST  28  /  ALT  31  /  AlkPhos  57  09-27    PT/INR - ( 26 Sep 2024 18:35 )   PT: 12.9 sec;   INR: 1.11 ratio         PTT - ( 26 Sep 2024 18:35 )  PTT:28.3 sec  Urinalysis Basic - ( 27 Sep 2024 05:15 )    Color: x / Appearance: x / SG: x / pH: x  Gluc: 92 mg/dL / Ketone: x  / Bili: x / Urobili: x   Blood: x / Protein: x / Nitrite: x   Leuk Esterase: x / RBC: x / WBC x   Sq Epi: x / Non Sq Epi: x / Bacteria: x

## 2024-09-28 NOTE — PROGRESS NOTE ADULT - ASSESSMENT
33 yo M with pmhx of UC comes to the ED after experiencing worsening diarrhea for the past one month with outpatient stool studies positive for C Diff and initiation of fidaxomicin. On admission, labs significant for elevated WBC and CT A/P showing UC flare. GI consulted for further management    Impression:  #UC flare  #C diff +  #bloody diarrhea     - patient with hx of UC and trialed on multiple biologics and steroid taper  - also found to be C diff + outpatient   - overall would favor presentation to be UC flare due to bloody diarrhea, as C Diff does not usually cause hematochezia    Recommendations:  - f/u fecal calprotectin, stool culture  - c/w solu-medrol 20 q8  - colorectal surgery consult as patient has had failed multiple UC therapies  - please obtain quant gold and hepatitis B serologies  - DVT ppx as patients with UC flare are high risk for VTE   - continue with fidaxomicin; can discontinue cipro/flagyl  - avoid opioids, anti-cholinergics, and anti-diarrheals as these can predispose to toxic megacolon  - avoid NSAIDs   - will plan for flex sig on monday    Berkley Vaughan MD  Gastroenterology/Hepatology Fellow, PGY-5  Please contact via TEAMS    NON-URGENT CONSULTS:  Please email [email or web address]

## 2024-09-29 LAB
ANION GAP SERPL CALC-SCNC: 14 MMOL/L — SIGNIFICANT CHANGE UP (ref 7–14)
BASOPHILS # BLD AUTO: 0.08 K/UL — SIGNIFICANT CHANGE UP (ref 0–0.2)
BASOPHILS NFR BLD AUTO: 0.3 % — SIGNIFICANT CHANGE UP (ref 0–2)
BUN SERPL-MCNC: 7 MG/DL — SIGNIFICANT CHANGE UP (ref 7–23)
CALCIUM SERPL-MCNC: 8.5 MG/DL — SIGNIFICANT CHANGE UP (ref 8.4–10.5)
CHLORIDE SERPL-SCNC: 99 MMOL/L — SIGNIFICANT CHANGE UP (ref 98–107)
CO2 SERPL-SCNC: 23 MMOL/L — SIGNIFICANT CHANGE UP (ref 22–31)
CREAT SERPL-MCNC: 0.79 MG/DL — SIGNIFICANT CHANGE UP (ref 0.5–1.3)
EGFR: 121 ML/MIN/1.73M2 — SIGNIFICANT CHANGE UP
EOSINOPHIL # BLD AUTO: 0.04 K/UL — SIGNIFICANT CHANGE UP (ref 0–0.5)
EOSINOPHIL NFR BLD AUTO: 0.1 % — SIGNIFICANT CHANGE UP (ref 0–6)
GLUCOSE SERPL-MCNC: 97 MG/DL — SIGNIFICANT CHANGE UP (ref 70–99)
HCT VFR BLD CALC: 43.3 % — SIGNIFICANT CHANGE UP (ref 39–50)
HGB BLD-MCNC: 14.7 G/DL — SIGNIFICANT CHANGE UP (ref 13–17)
IANC: 20.77 K/UL — HIGH (ref 1.8–7.4)
IMM GRANULOCYTES NFR BLD AUTO: 0.8 % — SIGNIFICANT CHANGE UP (ref 0–0.9)
LYMPHOCYTES # BLD AUTO: 16.3 % — SIGNIFICANT CHANGE UP (ref 13–44)
LYMPHOCYTES # BLD AUTO: 4.45 K/UL — HIGH (ref 1–3.3)
MAGNESIUM SERPL-MCNC: 2.2 MG/DL — SIGNIFICANT CHANGE UP (ref 1.6–2.6)
MCHC RBC-ENTMCNC: 27.8 PG — SIGNIFICANT CHANGE UP (ref 27–34)
MCHC RBC-ENTMCNC: 33.9 GM/DL — SIGNIFICANT CHANGE UP (ref 32–36)
MCV RBC AUTO: 81.9 FL — SIGNIFICANT CHANGE UP (ref 80–100)
MONOCYTES # BLD AUTO: 1.69 K/UL — HIGH (ref 0–0.9)
MONOCYTES NFR BLD AUTO: 6.2 % — SIGNIFICANT CHANGE UP (ref 2–14)
NEUTROPHILS # BLD AUTO: 20.77 K/UL — HIGH (ref 1.8–7.4)
NEUTROPHILS NFR BLD AUTO: 76.3 % — SIGNIFICANT CHANGE UP (ref 43–77)
NRBC # BLD: 0 /100 WBCS — SIGNIFICANT CHANGE UP (ref 0–0)
NRBC # FLD: 0 K/UL — SIGNIFICANT CHANGE UP (ref 0–0)
PHOSPHATE SERPL-MCNC: 4.6 MG/DL — HIGH (ref 2.5–4.5)
PLATELET # BLD AUTO: 436 K/UL — HIGH (ref 150–400)
POTASSIUM SERPL-MCNC: 4.3 MMOL/L — SIGNIFICANT CHANGE UP (ref 3.5–5.3)
POTASSIUM SERPL-SCNC: 4.3 MMOL/L — SIGNIFICANT CHANGE UP (ref 3.5–5.3)
RBC # BLD: 5.29 M/UL — SIGNIFICANT CHANGE UP (ref 4.2–5.8)
RBC # FLD: 12.9 % — SIGNIFICANT CHANGE UP (ref 10.3–14.5)
SODIUM SERPL-SCNC: 136 MMOL/L — SIGNIFICANT CHANGE UP (ref 135–145)
WBC # BLD: 27.24 K/UL — HIGH (ref 3.8–10.5)
WBC # FLD AUTO: 27.24 K/UL — HIGH (ref 3.8–10.5)

## 2024-09-29 PROCEDURE — 99232 SBSQ HOSP IP/OBS MODERATE 35: CPT

## 2024-09-29 RX ADMIN — FIDAXOMICIN 200 MILLIGRAM(S): 200 GRANULE, FOR SUSPENSION ORAL at 06:35

## 2024-09-29 RX ADMIN — METHYLPREDNISOLONE ACETATE 20 MILLIGRAM(S): 80 INJECTION, SUSPENSION INTRA-ARTICULAR; INTRALESIONAL; INTRAMUSCULAR; SOFT TISSUE at 20:32

## 2024-09-29 RX ADMIN — METHYLPREDNISOLONE ACETATE 20 MILLIGRAM(S): 80 INJECTION, SUSPENSION INTRA-ARTICULAR; INTRALESIONAL; INTRAMUSCULAR; SOFT TISSUE at 12:26

## 2024-09-29 RX ADMIN — PANTOPRAZOLE SODIUM 40 MILLIGRAM(S): 40 TABLET, DELAYED RELEASE ORAL at 06:36

## 2024-09-29 RX ADMIN — FIDAXOMICIN 200 MILLIGRAM(S): 200 GRANULE, FOR SUSPENSION ORAL at 17:52

## 2024-09-29 RX ADMIN — ENOXAPARIN SODIUM 40 MILLIGRAM(S): 150 INJECTION SUBCUTANEOUS at 17:51

## 2024-09-29 RX ADMIN — METHYLPREDNISOLONE ACETATE 20 MILLIGRAM(S): 80 INJECTION, SUSPENSION INTRA-ARTICULAR; INTRALESIONAL; INTRAMUSCULAR; SOFT TISSUE at 00:24

## 2024-09-29 NOTE — PROGRESS NOTE ADULT - PROBLEM SELECTOR PLAN 1
-pt with Hx UC presenting with ongoing flare for 3 months notable for episodes of bloody diarrhea--> improving now   -follows primarily with [facility], was previously on entyvio but switched to stelara 1 month ago s/p loading dose and 1 maintenance dose  - cw with IV steroid ,  PPI, monitor glucose   - GI and ID consult rec is appreciated   - cipro/flagyl  was DC   -GI consult rec is noted --> for FLEX SIG in AM ( Enema in AM ) , f/up AM labs   -monitor Hgb, currently stable -pt with Hx UC presenting with ongoing flare for 3 months notable for episodes of bloody diarrhea  - Plan for Flex sig in AM on 9/30   -follows primarily with [facility], was previously on entyvio but switched to stelara 1 month ago s/p loading dose and 1 maintenance dose--> GI at [place] ( Dr Edwin Osullivan wants transfer to [place] for possible JAK2 inhibitors   - cw with IV steroid ,  PPI, monitor glucose   - GI and ID consult rec is appreciated   - cipro/flagyl  was DC   -GI consult rec is noted --> for FLEX SIG in AM ( Enema in AM ) , f/up AM labs   -monitor Hgb, currently stable

## 2024-09-29 NOTE — PROGRESS NOTE ADULT - ASSESSMENT
34M PMH ulcerative colitis following with Dr. Osullivan of [facility] presents with 8-10 episodes of bloody diarrhea during the day with further episodes of bloody diarrhea at night, advised to come in to hospital for treatment of UC flare.

## 2024-09-29 NOTE — PROGRESS NOTE ADULT - SUBJECTIVE AND OBJECTIVE BOX
Patient is a 34y old  Male who presents with a chief complaint of UC flare w/ bloody diarrhea (28 Sep 2024 19:11)      SUBJECTIVE / OVERNIGHT EVENTS:    Tele reviewed:       ADDITIONAL REVIEW OF SYSTEMS: Negative except for above    MEDICATIONS  (STANDING):  enoxaparin Injectable 40 milliGRAM(s) SubCutaneous every 24 hours  fidaxomicin 200 milliGRAM(s) Oral two times a day  influenza   Vaccine 0.5 milliLiter(s) IntraMuscular once  methylPREDNISolone sodium succinate Injectable 20 milliGRAM(s) IV Push every 8 hours  pantoprazole    Tablet 40 milliGRAM(s) Oral before breakfast  sodium chloride 0.9%. 1000 milliLiter(s) (100 mL/Hr) IV Continuous <Continuous>    MEDICATIONS  (PRN):      CAPILLARY BLOOD GLUCOSE        I&O's Summary      PHYSICAL EXAM:  Vital Signs Last 24 Hrs  T(C): 36.1 (29 Sep 2024 06:35), Max: 36.5 (28 Sep 2024 11:58)  T(F): 97 (29 Sep 2024 06:35), Max: 97.7 (28 Sep 2024 11:58)  HR: 98 (29 Sep 2024 06:35) (98 - 102)  BP: 129/83 (29 Sep 2024 06:35) (121/79 - 129/83)  BP(mean): --  RR: 18 (29 Sep 2024 06:35) (18 - 18)  SpO2: 98% (29 Sep 2024 06:35) (97% - 100%)    Parameters below as of 29 Sep 2024 06:35  Patient On (Oxygen Delivery Method): room air        PHYSICAL EXAM:  GENERAL: NAD, well-developed  CHEST/LUNG: Clear to auscultation bilaterally; No wheeze  HEART: Regular rate and rhythm; No murmurs, rubs, or gallops  ABDOMEN: Soft, Nontender, Nondistended; Bowel sounds present  EXTREMITIES:  2+ Peripheral Pulses, No clubbing, cyanosis, or edema  PSYCH: AAOx3        LABS:                        14.7   27.24 )-----------( 436      ( 29 Sep 2024 06:20 )             43.3     09-29    136  |  99  |  7   ----------------------------<  97  4.3   |  23  |  0.79    Ca    8.5      29 Sep 2024 06:20  Phos  4.6     09-29  Mg     2.20     09-29            Urinalysis Basic - ( 29 Sep 2024 06:20 )    Color: x / Appearance: x / SG: x / pH: x  Gluc: 97 mg/dL / Ketone: x  / Bili: x / Urobili: x   Blood: x / Protein: x / Nitrite: x   Leuk Esterase: x / RBC: x / WBC x   Sq Epi: x / Non Sq Epi: x / Bacteria: x        Culture - Blood (collected 26 Sep 2024 18:48)  Source: .Blood BLOOD  Preliminary Report (29 Sep 2024 01:02):    No growth at 48 Hours    Culture - Blood (collected 26 Sep 2024 18:35)  Source: .Blood BLOOD  Preliminary Report (29 Sep 2024 01:02):    No growth at 48 Hours        RADIOLOGY & ADDITIONAL TESTS:    Imaging Personally Reviewed:    Electrocardiogram Personally Reviewed:    COORDINATION OF CARE:  Care Discussed with Consultants/Other Providers [Y/N]:  Prior or Outpatient Records Reviewed [Y/N]:     Patient is a 34y old  Male who presents with a chief complaint of UC flare w/ bloody diarrhea (28 Sep 2024 19:11)      SUBJECTIVE / OVERNIGHT EVENTS:  GI rec is noted and appreciated , plan for Flex sig in AM         MEDICATIONS  (STANDING):  enoxaparin Injectable 40 milliGRAM(s) SubCutaneous every 24 hours  fidaxomicin 200 milliGRAM(s) Oral two times a day  influenza   Vaccine 0.5 milliLiter(s) IntraMuscular once  methylPREDNISolone sodium succinate Injectable 20 milliGRAM(s) IV Push every 8 hours  pantoprazole    Tablet 40 milliGRAM(s) Oral before breakfast  sodium chloride 0.9%. 1000 milliLiter(s) (100 mL/Hr) IV Continuous <Continuous>    MEDICATIONS  (PRN):      CAPILLARY BLOOD GLUCOSE        I&O's Summary      PHYSICAL EXAM:  Vital Signs Last 24 Hrs  T(C): 36.1 (29 Sep 2024 06:35), Max: 36.5 (28 Sep 2024 11:58)  T(F): 97 (29 Sep 2024 06:35), Max: 97.7 (28 Sep 2024 11:58)  HR: 98 (29 Sep 2024 06:35) (98 - 102)  BP: 129/83 (29 Sep 2024 06:35) (121/79 - 129/83)  BP(mean): --  RR: 18 (29 Sep 2024 06:35) (18 - 18)  SpO2: 98% (29 Sep 2024 06:35) (97% - 100%)    Parameters below as of 29 Sep 2024 06:35  Patient On (Oxygen Delivery Method): room air        PHYSICAL EXAM:  GENERAL: NAD, well-developed  CHEST/LUNG: Clear to auscultation bilaterally; No wheeze  HEART: Regular rate and rhythm; No murmurs, rubs, or gallops  ABDOMEN: Soft, Nontender, Nondistended; Bowel sounds present  EXTREMITIES:  2+ Peripheral Pulses, No clubbing, cyanosis, or edema  PSYCH: AAOx3        LABS:                        14.7   27.24 )-----------( 436      ( 29 Sep 2024 06:20 )             43.3     09-29    136  |  99  |  7   ----------------------------<  97  4.3   |  23  |  0.79    Ca    8.5      29 Sep 2024 06:20  Phos  4.6     09-29  Mg     2.20     09-29            Urinalysis Basic - ( 29 Sep 2024 06:20 )    Color: x / Appearance: x / SG: x / pH: x  Gluc: 97 mg/dL / Ketone: x  / Bili: x / Urobili: x   Blood: x / Protein: x / Nitrite: x   Leuk Esterase: x / RBC: x / WBC x   Sq Epi: x / Non Sq Epi: x / Bacteria: x        Culture - Blood (collected 26 Sep 2024 18:48)  Source: .Blood BLOOD  Preliminary Report (29 Sep 2024 01:02):    No growth at 48 Hours    Culture - Blood (collected 26 Sep 2024 18:35)  Source: .Blood BLOOD  Preliminary Report (29 Sep 2024 01:02):    No growth at 48 Hours        RADIOLOGY & ADDITIONAL TESTS:    Imaging Personally Reviewed:    Electrocardiogram Personally Reviewed:    COORDINATION OF CARE:  Care Discussed with Consultants/Other Providers [Y/N]:  Prior or Outpatient Records Reviewed [Y/N]:     Patient is a 34y old  Male who presents with a chief complaint of UC flare w/ bloody diarrhea (28 Sep 2024 19:11)      SUBJECTIVE / OVERNIGHT EVENTS:  GI rec is noted and appreciated , plan for Flex sig in AM   stool has been fluctuating between watery and chunky and small amount of blood today in stool but less than before         MEDICATIONS  (STANDING):  enoxaparin Injectable 40 milliGRAM(s) SubCutaneous every 24 hours  fidaxomicin 200 milliGRAM(s) Oral two times a day  influenza   Vaccine 0.5 milliLiter(s) IntraMuscular once  methylPREDNISolone sodium succinate Injectable 20 milliGRAM(s) IV Push every 8 hours  pantoprazole    Tablet 40 milliGRAM(s) Oral before breakfast  sodium chloride 0.9%. 1000 milliLiter(s) (100 mL/Hr) IV Continuous <Continuous>    MEDICATIONS  (PRN):      CAPILLARY BLOOD GLUCOSE        I&O's Summary      PHYSICAL EXAM:  Vital Signs Last 24 Hrs  T(C): 36.1 (29 Sep 2024 06:35), Max: 36.5 (28 Sep 2024 11:58)  T(F): 97 (29 Sep 2024 06:35), Max: 97.7 (28 Sep 2024 11:58)  HR: 98 (29 Sep 2024 06:35) (98 - 102)  BP: 129/83 (29 Sep 2024 06:35) (121/79 - 129/83)  BP(mean): --  RR: 18 (29 Sep 2024 06:35) (18 - 18)  SpO2: 98% (29 Sep 2024 06:35) (97% - 100%)    Parameters below as of 29 Sep 2024 06:35  Patient On (Oxygen Delivery Method): room air        PHYSICAL EXAM:  GENERAL: NAD, well-developed  CHEST/LUNG: Clear to auscultation bilaterally; No wheeze  HEART: Regular rate and rhythm; No murmurs, rubs, or gallops  ABDOMEN: Soft, Nontender, Nondistended; Bowel sounds present  PSYCH: AAOx3        LABS:                        14.7   27.24 )-----------( 436      ( 29 Sep 2024 06:20 )             43.3     09-29    136  |  99  |  7   ----------------------------<  97  4.3   |  23  |  0.79    Ca    8.5      29 Sep 2024 06:20  Phos  4.6     09-29  Mg     2.20     09-29            Urinalysis Basic - ( 29 Sep 2024 06:20 )    Color: x / Appearance: x / SG: x / pH: x  Gluc: 97 mg/dL / Ketone: x  / Bili: x / Urobili: x   Blood: x / Protein: x / Nitrite: x   Leuk Esterase: x / RBC: x / WBC x   Sq Epi: x / Non Sq Epi: x / Bacteria: x        Culture - Blood (collected 26 Sep 2024 18:48)  Source: .Blood BLOOD  Preliminary Report (29 Sep 2024 01:02):    No growth at 48 Hours    Culture - Blood (collected 26 Sep 2024 18:35)  Source: .Blood BLOOD  Preliminary Report (29 Sep 2024 01:02):    No growth at 48 Hours        RADIOLOGY & ADDITIONAL TESTS:    Imaging Personally Reviewed:    Electrocardiogram Personally Reviewed:    COORDINATION OF CARE:  Care Discussed with Consultants/Other Providers [Y/N]:  Prior or Outpatient Records Reviewed [Y/N]:

## 2024-09-29 NOTE — PROGRESS NOTE ADULT - PROBLEM SELECTOR PLAN 2
-pt reports he tested positive for C. diff several days prior to admission  -on fidaxomycin outpt, has been taking BID  -POs C.diff,  Neg GI PCR, F/up stool culture and stool calpropectin  -pt. brought own dose of fidaxomycin, can administer as per pharmacy to continue  as reported   -ID rec is noted   -monitor electrolytes  -contact precautions  -procal wnl, blood culturre is neg so far -pt reports he tested positive for C. diff several days prior to admission  -on fidaxomycin outpt, has been taking BID  -POs C.diff,  Neg GI PCR, F/up stool culture and stool calpropectin  -pt. brought own dose of fidaxomycin, can administer as per pharmacy to continue  as reported   -ID rec is noted   -monitor electrolytes  -contact precautions  -procal wnl, blood culture is neg so far

## 2024-09-29 NOTE — PROGRESS NOTE ADULT - NSPROGADDITIONALINFOA_GEN_ALL_CORE
Alayna Moreno  Hospitalist  available on TEAMS COLORECTAL consult is to be called in AM ( Monday )     [facility]  available on TEAMS -follows primarily with [facility], was previously on entyvio but switched to stelara 1 month ago s/p loading dose and 1 maintenance dose--> GI at [place] ( Dr Edwin Osullivan wants transfer to [place] for possible JAK2 inhibitors --> pt wants to discuss with GI team on 9/30 --> [place]o is aware   COLORECTAL consult is to be called if not going to [facility] and if GI wants after Flex Sig     Alayna Moerno  Hospitalist  available on TEAMS

## 2024-09-29 NOTE — PROGRESS NOTE ADULT - PROBLEM SELECTOR PLAN 3
Diet: Regular  DVT: started as per GI   Dispo: Pending, likely home Diet: Regular--NPO after MN   DVT: started as per GI   Dispo: Pending, likely home

## 2024-09-30 LAB
ADD ON TEST-SPECIMEN IN LAB: SIGNIFICANT CHANGE UP
ANION GAP SERPL CALC-SCNC: 15 MMOL/L — HIGH (ref 7–14)
BASOPHILS # BLD AUTO: 0.07 K/UL — SIGNIFICANT CHANGE UP (ref 0–0.2)
BASOPHILS NFR BLD AUTO: 0.3 % — SIGNIFICANT CHANGE UP (ref 0–2)
BUN SERPL-MCNC: 5 MG/DL — LOW (ref 7–23)
CALCIUM SERPL-MCNC: 8.4 MG/DL — SIGNIFICANT CHANGE UP (ref 8.4–10.5)
CHLORIDE SERPL-SCNC: 100 MMOL/L — SIGNIFICANT CHANGE UP (ref 98–107)
CO2 SERPL-SCNC: 21 MMOL/L — LOW (ref 22–31)
CREAT SERPL-MCNC: 0.81 MG/DL — SIGNIFICANT CHANGE UP (ref 0.5–1.3)
CRP SERPL-MCNC: 14.2 MG/L — HIGH
CRP SERPL-MCNC: 9.9 MG/L — HIGH
CULTURE RESULTS: SIGNIFICANT CHANGE UP
EGFR: 120 ML/MIN/1.73M2 — SIGNIFICANT CHANGE UP
EOSINOPHIL # BLD AUTO: 0.47 K/UL — SIGNIFICANT CHANGE UP (ref 0–0.5)
EOSINOPHIL NFR BLD AUTO: 2 % — SIGNIFICANT CHANGE UP (ref 0–6)
GLUCOSE SERPL-MCNC: 109 MG/DL — HIGH (ref 70–99)
HCT VFR BLD CALC: 42.3 % — SIGNIFICANT CHANGE UP (ref 39–50)
HGB BLD-MCNC: 14.4 G/DL — SIGNIFICANT CHANGE UP (ref 13–17)
IANC: 15.37 K/UL — HIGH (ref 1.8–7.4)
IMM GRANULOCYTES NFR BLD AUTO: 0.6 % — SIGNIFICANT CHANGE UP (ref 0–0.9)
LYMPHOCYTES # BLD AUTO: 20.7 % — SIGNIFICANT CHANGE UP (ref 13–44)
LYMPHOCYTES # BLD AUTO: 4.84 K/UL — HIGH (ref 1–3.3)
MAGNESIUM SERPL-MCNC: 2.1 MG/DL — SIGNIFICANT CHANGE UP (ref 1.6–2.6)
MCHC RBC-ENTMCNC: 27.7 PG — SIGNIFICANT CHANGE UP (ref 27–34)
MCHC RBC-ENTMCNC: 34 GM/DL — SIGNIFICANT CHANGE UP (ref 32–36)
MCV RBC AUTO: 81.3 FL — SIGNIFICANT CHANGE UP (ref 80–100)
MONOCYTES # BLD AUTO: 2.45 K/UL — HIGH (ref 0–0.9)
MONOCYTES NFR BLD AUTO: 10.5 % — SIGNIFICANT CHANGE UP (ref 2–14)
NEUTROPHILS # BLD AUTO: 15.37 K/UL — HIGH (ref 1.8–7.4)
NEUTROPHILS NFR BLD AUTO: 65.9 % — SIGNIFICANT CHANGE UP (ref 43–77)
NRBC # BLD: 0 /100 WBCS — SIGNIFICANT CHANGE UP (ref 0–0)
NRBC # FLD: 0 K/UL — SIGNIFICANT CHANGE UP (ref 0–0)
PHOSPHATE SERPL-MCNC: 4.7 MG/DL — HIGH (ref 2.5–4.5)
PLATELET # BLD AUTO: 414 K/UL — HIGH (ref 150–400)
POTASSIUM SERPL-MCNC: 3.8 MMOL/L — SIGNIFICANT CHANGE UP (ref 3.5–5.3)
POTASSIUM SERPL-SCNC: 3.8 MMOL/L — SIGNIFICANT CHANGE UP (ref 3.5–5.3)
RBC # BLD: 5.2 M/UL — SIGNIFICANT CHANGE UP (ref 4.2–5.8)
RBC # FLD: 13 % — SIGNIFICANT CHANGE UP (ref 10.3–14.5)
SODIUM SERPL-SCNC: 136 MMOL/L — SIGNIFICANT CHANGE UP (ref 135–145)
SPECIMEN SOURCE: SIGNIFICANT CHANGE UP
WBC # BLD: 23.34 K/UL — HIGH (ref 3.8–10.5)
WBC # FLD AUTO: 23.34 K/UL — HIGH (ref 3.8–10.5)

## 2024-09-30 PROCEDURE — 99233 SBSQ HOSP IP/OBS HIGH 50: CPT | Mod: GC

## 2024-09-30 PROCEDURE — 99233 SBSQ HOSP IP/OBS HIGH 50: CPT

## 2024-09-30 PROCEDURE — 99232 SBSQ HOSP IP/OBS MODERATE 35: CPT

## 2024-09-30 RX ORDER — PREDNISONE 5 MG/1
40 TABLET ORAL
Refills: 0 | DISCHARGE

## 2024-09-30 RX ORDER — METHYLPREDNISOLONE ACETATE 80 MG/ML
20 INJECTION, SUSPENSION INTRA-ARTICULAR; INTRALESIONAL; INTRAMUSCULAR; SOFT TISSUE
Qty: 0 | Refills: 0 | DISCHARGE
Start: 2024-09-30

## 2024-09-30 RX ORDER — PANTOPRAZOLE SODIUM 40 MG/1
1 TABLET, DELAYED RELEASE ORAL
Qty: 0 | Refills: 0 | DISCHARGE
Start: 2024-09-30

## 2024-09-30 RX ORDER — USTEKINUMAB 45 MG/.5ML
0 INJECTION, SOLUTION SUBCUTANEOUS
Refills: 0 | DISCHARGE

## 2024-09-30 RX ADMIN — FIDAXOMICIN 200 MILLIGRAM(S): 200 GRANULE, FOR SUSPENSION ORAL at 17:41

## 2024-09-30 RX ADMIN — METHYLPREDNISOLONE ACETATE 20 MILLIGRAM(S): 80 INJECTION, SUSPENSION INTRA-ARTICULAR; INTRALESIONAL; INTRAMUSCULAR; SOFT TISSUE at 05:22

## 2024-09-30 RX ADMIN — METHYLPREDNISOLONE ACETATE 20 MILLIGRAM(S): 80 INJECTION, SUSPENSION INTRA-ARTICULAR; INTRALESIONAL; INTRAMUSCULAR; SOFT TISSUE at 20:40

## 2024-09-30 RX ADMIN — FIDAXOMICIN 200 MILLIGRAM(S): 200 GRANULE, FOR SUSPENSION ORAL at 05:28

## 2024-09-30 RX ADMIN — METHYLPREDNISOLONE ACETATE 20 MILLIGRAM(S): 80 INJECTION, SUSPENSION INTRA-ARTICULAR; INTRALESIONAL; INTRAMUSCULAR; SOFT TISSUE at 11:39

## 2024-09-30 RX ADMIN — PANTOPRAZOLE SODIUM 40 MILLIGRAM(S): 40 TABLET, DELAYED RELEASE ORAL at 05:23

## 2024-09-30 NOTE — PROGRESS NOTE ADULT - SUBJECTIVE AND OBJECTIVE BOX
GI Progress Note     SUBJECTIVE: Patient seen and examined at bedside.   - feels a little better today  - reports having had 10 BMs in the last 24 hrs but less blood and more formed ([place] 6)   - spoke with patient and     OBJECTIVE:    VITAL SIGNS:  ICU Vital Signs Last 24 Hrs  T(C): 36.3 (28 Sep 2024 06:17), Max: 36.9 (27 Sep 2024 17:40)  T(F): 97.3 (28 Sep 2024 06:17), Max: 98.4 (27 Sep 2024 17:40)  HR: 95 (28 Sep 2024 06:17) (95 - 105)  BP: 135/87 (28 Sep 2024 06:17) (125/88 - 146/98)  BP(mean): --  ABP: --  ABP(mean): --  RR: 18 (28 Sep 2024 06:17) (18 - 18)  SpO2: 97% (28 Sep 2024 06:17) (97% - 100%)    O2 Parameters below as of 28 Sep 2024 06:17  Patient On (Oxygen Delivery Method): room air      PHYSICAL EXAM:    General: NAD  HEENT: NC/AT  Neck: supple  Respiratory: Regular RR, no increase in WOB  Cardiovascular: RRR  Abdomen: soft, NT/ND; +BS x4  Extremities: WWP, 2+ peripheral pulses b/l  Skin: normal color and turgor; no rash  Neurological: A&OX3    MEDICATIONS:  MEDICATIONS  (STANDING):  fidaxomicin 200 milliGRAM(s) Oral two times a day  influenza   Vaccine 0.5 milliLiter(s) IntraMuscular once  methylPREDNISolone sodium succinate Injectable 20 milliGRAM(s) IV Push every 8 hours  sodium chloride 0.9%. 1000 milliLiter(s) (100 mL/Hr) IV Continuous <Continuous>    MEDICATIONS  (PRN):      ALLERGIES:  Allergies    No Known Allergies    Intolerances    mesalamine (Other (Mod to Severe))      LABS:                        14.6   25.35 )-----------( 382      ( 27 Sep 2024 05:15 )             43.4     09-27    133[L]  |  98  |  6[L]  ----------------------------<  92  3.8   |  24  |  0.89    Ca    8.0[L]      27 Sep 2024 05:15  Phos  3.7     09-27  Mg     2.20     09-27    TPro  6.1  /  Alb  3.1[L]  /  TBili  0.3  /  DBili  x   /  AST  28  /  ALT  31  /  AlkPhos  57  09-27    PT/INR - ( 26 Sep 2024 18:35 )   PT: 12.9 sec;   INR: 1.11 ratio         PTT - ( 26 Sep 2024 18:35 )  PTT:28.3 sec  Urinalysis Basic - ( 27 Sep 2024 05:15 )    Color: x / Appearance: x / SG: x / pH: x  Gluc: 92 mg/dL / Ketone: x  / Bili: x / Urobili: x   Blood: x / Protein: x / Nitrite: x   Leuk Esterase: x / RBC: x / WBC x   Sq Epi: x / Non Sq Epi: x / Bacteria: x         GI Progress Note     SUBJECTIVE: Patient seen and examined at bedside.   - feels a little better today  - reports having had 10 BMs in the last 24 hrs but less blood and more formed ([place] 6)   - spoke with patient and IBD team at [facility]; agreed for transfer to [facility] for possible initiation of Rinvoq  - pending flex sig today    OBJECTIVE:    VITAL SIGNS:  ICU Vital Signs Last 24 Hrs  T(C): 36.3 (28 Sep 2024 06:17), Max: 36.9 (27 Sep 2024 17:40)  T(F): 97.3 (28 Sep 2024 06:17), Max: 98.4 (27 Sep 2024 17:40)  HR: 95 (28 Sep 2024 06:17) (95 - 105)  BP: 135/87 (28 Sep 2024 06:17) (125/88 - 146/98)  BP(mean): --  ABP: --  ABP(mean): --  RR: 18 (28 Sep 2024 06:17) (18 - 18)  SpO2: 97% (28 Sep 2024 06:17) (97% - 100%)    O2 Parameters below as of 28 Sep 2024 06:17  Patient On (Oxygen Delivery Method): room air      PHYSICAL EXAM:    General: NAD  HEENT: NC/AT  Neck: supple  Respiratory: Regular RR, no increase in WOB  Cardiovascular: RRR  Abdomen: soft, NT/ND; +BS x4  Extremities: WWP, 2+ peripheral pulses b/l  Skin: normal color and turgor; no rash  Neurological: A&OX3    MEDICATIONS:  MEDICATIONS  (STANDING):  fidaxomicin 200 milliGRAM(s) Oral two times a day  influenza   Vaccine 0.5 milliLiter(s) IntraMuscular once  methylPREDNISolone sodium succinate Injectable 20 milliGRAM(s) IV Push every 8 hours  sodium chloride 0.9%. 1000 milliLiter(s) (100 mL/Hr) IV Continuous <Continuous>    MEDICATIONS  (PRN):      ALLERGIES:  Allergies    No Known Allergies    Intolerances    mesalamine (Other (Mod to Severe))      LABS:                        14.6   25.35 )-----------( 382      ( 27 Sep 2024 05:15 )             43.4     09-27    133[L]  |  98  |  6[L]  ----------------------------<  92  3.8   |  24  |  0.89    Ca    8.0[L]      27 Sep 2024 05:15  Phos  3.7     09-27  Mg     2.20     09-27    TPro  6.1  /  Alb  3.1[L]  /  TBili  0.3  /  DBili  x   /  AST  28  /  ALT  31  /  AlkPhos  57  09-27    PT/INR - ( 26 Sep 2024 18:35 )   PT: 12.9 sec;   INR: 1.11 ratio         PTT - ( 26 Sep 2024 18:35 )  PTT:28.3 sec  Urinalysis Basic - ( 27 Sep 2024 05:15 )    Color: x / Appearance: x / SG: x / pH: x  Gluc: 92 mg/dL / Ketone: x  / Bili: x / Urobili: x   Blood: x / Protein: x / Nitrite: x   Leuk Esterase: x / RBC: x / WBC x   Sq Epi: x / Non Sq Epi: x / Bacteria: x

## 2024-09-30 NOTE — PROGRESS NOTE ADULT - SUBJECTIVE AND OBJECTIVE BOX
Follow Up:      Inverval History/ROS:Patient is a 34y old  Male who presents with a chief complaint of UC flare w/ bloody diarrhea (30 Sep 2024 12:45)     Still has diarrhea  Still has blood in stool      Allergies    No Known Allergies    Intolerances    mesalamine (Other (Mod to Severe))      ANTIMICROBIALS:  fidaxomicin 200 two times a day      OTHER MEDS:  enoxaparin Injectable 40 milliGRAM(s) SubCutaneous every 24 hours  influenza   Vaccine 0.5 milliLiter(s) IntraMuscular once  methylPREDNISolone sodium succinate Injectable 20 milliGRAM(s) IV Push every 8 hours  pantoprazole    Tablet 40 milliGRAM(s) Oral before breakfast  sodium chloride 0.9%. 1000 milliLiter(s) IV Continuous <Continuous>      Vital Signs Last 24 Hrs  T(C): 36.6 (30 Sep 2024 14:19), Max: 36.8 (29 Sep 2024 17:57)  T(F): 97.8 (30 Sep 2024 14:19), Max: 98.2 (29 Sep 2024 17:57)  HR: 107 (30 Sep 2024 14:19) (93 - 107)  BP: 139/87 (30 Sep 2024 14:19) (127/89 - 139/91)  BP(mean): --  RR: 18 (30 Sep 2024 14:19) (18 - 18)  SpO2: 100% (30 Sep 2024 14:19) (98% - 100%)    Parameters below as of 30 Sep 2024 14:19  Patient On (Oxygen Delivery Method): room air        PHYSICAL EXAM:  General: [ ] non-toxic  HEAD/EYES: [ ] PERRL [x ] white sclera [ ] icterus  ENT:  [ ] normal [x ] supple [ ] thrush [ ] pharyngeal exudate  Cardiovascular:   [ ] murmur x[ ] normal [ ] PPM/AICD  Respiratory:  [x ] clear to ausculation bilaterally  GI:  [x ] soft, non-tender, normal bowel sounds  :  [ ] torres [ ] no CVA tenderness   Musculoskeletal:  [ ] no synovitis  Neurologic:  [ ] non-focal exam   Skin:  [x ] no rash  Lymph: [x ] no lymphadenopathy  Psychiatric:  [ x] appropriate affect [ ] alert & oriented  Lines:  [ x] no phlebitis [ ] central line                                14.4   23.34 )-----------( 414      ( 30 Sep 2024 06:24 )             42.3       09-30    136  |  100  |  5[L]  ----------------------------<  109[H]  3.8   |  21[L]  |  0.81    Ca    8.4      30 Sep 2024 06:24  Phos  4.7     09-30  Mg     2.10     09-30        Urinalysis Basic - ( 30 Sep 2024 06:24 )    Color: x / Appearance: x / SG: x / pH: x  Gluc: 109 mg/dL / Ketone: x  / Bili: x / Urobili: x   Blood: x / Protein: x / Nitrite: x   Leuk Esterase: x / RBC: x / WBC x   Sq Epi: x / Non Sq Epi: x / Bacteria: x        MICROBIOLOGY:Culture Results:   No enteric pathogens isolated.  (Stool culture examined for Salmonella,  Shigella, Campylobacter, Aeromonas, Plesiomonas,  Vibrio, E.coli O157 and Yersinia) (09-27-24 @ 09:50)  Culture Results:   No growth at 72 Hours (09-26-24 @ 18:48)  Culture Results:   No growth at 72 Hours (09-26-24 @ 18:35)      RADIOLOGY:

## 2024-09-30 NOTE — PROGRESS NOTE ADULT - ASSESSMENT
33 yo M with pmhx of UC comes to the ED after experiencing worsening diarrhea for the past one month with outpatient stool studies positive for C Diff and initiation of fidaxomicin. On admission, labs significant for elevated WBC and CT A/P showing UC flare. GI consulted for further management    Impression:  #UC flare  #C diff +  #bloody diarrhea     - patient with hx of UC and trialed on multiple biologics and steroid taper  - also found to be C diff + outpatient   - overall would favor presentation to be UC flare due to bloody diarrhea, as C Diff does not usually cause hematochezia    Recommendations:  - f/u fecal calprotectin, stool culture  - c/w solu-medrol 20 q8  - colorectal surgery consult as patient has had failed multiple UC therapies  - please obtain quant gold and hepatitis B serologies  - DVT ppx as patients with UC flare are high risk for VTE   - continue with fidaxomicin; can discontinue cipro/flagyl  - avoid opioids, anti-cholinergics, and anti-diarrheals as these can predispose to toxic megacolon  - avoid NSAIDs   - will plan for flex sig on monday    Berkley Vaughan MD  Gastroenterology/Hepatology Fellow, PGY-5  Please contact via TEAMS    NON-URGENT CONSULTS:  Please email [email or web address]  [email or web address]   35 yo M with pmhx of UC comes to the ED after experiencing worsening diarrhea for the past one month with outpatient stool studies positive for C Diff and initiation of fidaxomicin. On admission, labs significant for elevated WBC and CT A/P showing UC flare. GI consulted for further management    Impression:  #UC flare  #C diff+  #bloody diarrhea   - patient with hx of UC and trialed on multiple biologics and steroid taper  - also found to be C diff + outpatient   - overall would favor presentation to be UC flare due to bloody diarrhea, as C Diff does not usually cause hematochezia  - HBV serologies neg for HBV    Recommendations:  - please send fecal calprotectin  - c/w solumedrol 20mg q8hrs  - pending flex sig later today, please keep NPO  - per request from patient and IBD team at [facility], please initiate transfer to [facility] for possible Rinvoq initiation  - please trend CBC, CMP, and CRP daily  - f/u stool culture  - f/u quant gold  - colorectal surgery consult as patient has had failed multiple UC therapies  - c/w DVT ppx as patients with UC flare are high risk for VTE  - continue with fidaxomicin (9/22 - ) for 10 day course per ID recs  - avoid opioids, anti-cholinergics, and anti-diarrheals as these can predispose to toxic megacolon  - avoid NSAIDs     All recommendations are preliminary until attending attestation.     Bentley Nichols, PGY-4  Gastroenterology & Hepatology Fellow  Available on TEAMS  Long range pager #: [phone]  Short range pager#: [phone]    For non-urgent consults, please send email to [email or web address] (for [facility]) or [email or web address] (for [facility])   35 yo M with pmhx of UC comes to the ED after experiencing worsening diarrhea for the past one month with outpatient stool studies positive for C Diff and initiation of fidaxomicin. On admission, labs significant for elevated WBC and CT A/P showing UC flare. GI consulted for further management    Impression:  #UC flare  #C diff+  #bloody diarrhea   - patient with hx of UC and trialed on multiple biologics and steroid taper  - also found to be C diff + outpatient   - overall would favor presentation to be UC flare due to bloody diarrhea, as C Diff does not usually cause hematochezia  - HBV serologies neg for HBV    Recommendations:  - please send fecal calprotectin  - c/w solumedrol 20mg q8hrs  - pending flex sig later today; please keep NPO  - per request from patient and IBD team at [facility], please initiate transfer to [facility] for possible Rinvoq initiation  - please trend CBC, CMP, and CRP daily  - f/u stool culture  - f/u quant gold  - colorectal surgery consult as patient has had failed multiple UC therapies  - c/w DVT ppx as patients with UC flare are high risk for VTE  - continue with fidaxomicin (9/22 - ) for 10 day course per ID recs  - avoid opioids, anti-cholinergics, and anti-diarrheals as these can predispose to toxic megacolon  - avoid NSAIDs     All recommendations are preliminary until attending attestation.     Bentley Nichols, PGY-4  Gastroenterology & Hepatology Fellow  Available on TEAMS  Long range pager #: [phone]  Short range pager#: [phone]    For non-urgent consults, please send email to [email or web address] (for NS) or [email or web address] (for [facility])   33 yo M with pmhx of UC comes to the ED after experiencing worsening diarrhea for the past one month with outpatient stool studies positive for C Diff and initiation of fidaxomicin. On admission, labs significant for elevated WBC and CT A/P showing UC flare. GI consulted for further management    Impression:  #UC flare  #C diff+  #bloody diarrhea   - patient with hx of UC and trialed on multiple biologics and steroid taper  - also found to be C diff + outpatient   - overall would favor presentation to be UC flare due to bloody diarrhea, as C Diff does not usually cause hematochezia  - HBV serologies neg for HBV    Recommendations:  - please send fecal calprotectin  - c/w solumedrol 20mg q8hrs  - pending flex sig later today; please keep NPO  - per request from patient and IBD team at [facility], please initiate transfer to [facility] for possible Rinvoq initiation  - please trend CBC, CMP, and CRP daily  - low residue diet  - f/u stool culture  - f/u quant gold  - colorectal surgery consult as patient has had failed multiple UC therapies  - c/w DVT ppx as patients with UC flare are high risk for VTE  - continue with fidaxomicin (9/22 - ) for 10 day course per ID recs  - avoid opioids, anti-cholinergics, and anti-diarrheals as these can predispose to toxic megacolon  - avoid NSAIDs     All recommendations are preliminary until attending attestation.     Bentley Nichols, PGY-4  Gastroenterology & Hepatology Fellow  Available on TEAMS  Long range pager #: [phone]  Short range pager#: [phone]    For non-urgent consults, please send email to [email or web address] (for [facility]) or [email or web address] (for [facility])

## 2024-09-30 NOTE — PROGRESS NOTE ADULT - ASSESSMENT
34 year old with ulcerative colitis   He is followed at [place]    Recent C diff test was positive    Presents with continued diarrhea with blood in his stool    Imaging with colitis    Overall, my suspicion is higher that his acute worsening of symptoms are related to a flair of his UC.  Decision re immunosuppression per GI.     It can be difficult to differentiate colonization with C diff and superimposed C diff in patients with IBD    Can finish 10 d course of fidaxomicin. If symptoms are not improving, it was raise my concern for an IBD flair.      Supportive care  GI evaluation in progress.   Pt is planning to transfer to [place]    Will sign off  Call with questions    Twan Montez MD  Please contact via TEAM between 8 am and 6 pm    In the evening or on weekends, can contact call service at [phone]

## 2024-09-30 NOTE — DISCHARGE NOTE NURSING/CASE MANAGEMENT/SOCIAL WORK - NSDCPEFALRISK_GEN_ALL_CORE
For information on Fall & Injury Prevention, visit: [email or web address] OR  [email or web address] OR  https://www.cdc.gov/steadi/patient.html

## 2024-09-30 NOTE — PROGRESS NOTE ADULT - PROBLEM SELECTOR PLAN 2
- positive Cdiff as outpatient, was initiated on fidaxomicin for 10d course - continue while admitted  - ID recs appreciated   - monitor electrolytes  - contact precautions

## 2024-09-30 NOTE — PROGRESS NOTE ADULT - SUBJECTIVE AND OBJECTIVE BOX
[facility] Division of [facility] Medicine  Brandin Crenshaw DO  Available via MS Teams  In house pager [phone]    SUBJECTIVE / OVERNIGHT EVENTS:  No acute events overnight. Patient seen and examined at bedside this morning.  Reports ongoing 10 BMs/day but improving in texture - less watery, more formed.    ADDITIONAL REVIEW OF SYSTEMS:    MEDICATIONS  (STANDING):  enoxaparin Injectable 40 milliGRAM(s) SubCutaneous every 24 hours  fidaxomicin 200 milliGRAM(s) Oral two times a day  influenza   Vaccine 0.5 milliLiter(s) IntraMuscular once  methylPREDNISolone sodium succinate Injectable 20 milliGRAM(s) IV Push every 8 hours  pantoprazole    Tablet 40 milliGRAM(s) Oral before breakfast  sodium chloride 0.9%. 1000 milliLiter(s) (100 mL/Hr) IV Continuous <Continuous>    MEDICATIONS  (PRN):      I&O's Summary      PHYSICAL EXAM:  Vital Signs Last 24 Hrs  T(C): 36.6 (30 Sep 2024 11:44), Max: 36.8 (29 Sep 2024 17:57)  T(F): 97.8 (30 Sep 2024 11:44), Max: 98.2 (29 Sep 2024 17:57)  HR: 101 (30 Sep 2024 11:44) (84 - 104)  BP: 127/89 (30 Sep 2024 11:44) (127/89 - 139/91)  BP(mean): --  RR: 18 (30 Sep 2024 11:44) (18 - 18)  SpO2: 100% (30 Sep 2024 11:44) (98% - 100%)    Parameters below as of 30 Sep 2024 11:44  Patient On (Oxygen Delivery Method): room air          LABS:                        14.4   23.34 )-----------( 414      ( 30 Sep 2024 06:24 )             42.3     09-30    136  |  100  |  5[L]  ----------------------------<  109[H]  3.8   |  21[L]  |  0.81    Ca    8.4      30 Sep 2024 06:24  Phos  4.7     09-30  Mg     2.10     09-30            Urinalysis Basic - ( 30 Sep 2024 06:24 )    Color: x / Appearance: x / SG: x / pH: x  Gluc: 109 mg/dL / Ketone: x  / Bili: x / Urobili: x   Blood: x / Protein: x / Nitrite: x   Leuk Esterase: x / RBC: x / WBC x   Sq Epi: x / Non Sq Epi: x / Bacteria: x

## 2024-09-30 NOTE — PROGRESS NOTE ADULT - ASSESSMENT
34M PMH ulcerative colitis following with Dr. Osullivan of [facility] presents with 8-10 episodes of bloody diarrhea during the day with further episodes of bloody diarrhea at night, advised to come in to hospital for treatment of UC flare.

## 2024-09-30 NOTE — DISCHARGE NOTE NURSING/CASE MANAGEMENT/SOCIAL WORK - PATIENT PORTAL LINK FT
You can access the FollowMyHealth Patient Portal offered by [facility] by registering at the following website: [email or web address]. By joining [email or web address]’s FollowMyHealth portal, you will also be able to view your health information using other applications (apps) compatible with our system.

## 2024-09-30 NOTE — PROGRESS NOTE ADULT - PROBLEM SELECTOR PLAN 1
- ongoing flare for 3 months notable for episodes of bloody diarrhea  - routinely follows with GI Dr Edwin Osullivan at [facility]; previously on Entyvio and recently switched to Stelara (s/p loading dose, 1 maintenance dose) - GI at [facility] are recommending transfer to their facility to possibly initiate JAK2 inhibitors  - currently receiving IV steroids - PPI ppx and glucose monitoring  - GI and ID following - c/w fidaxomicin for Cdiff as below, off cipro/flagyl  - GI planning flex-sig 9/30 after 2 tap water enemas  - trend H/H  - may consider JAK2 inhibitors vs colorectal surgery input vs transfer to [facility] pending flex-sig findings

## 2024-10-01 PROCEDURE — 99232 SBSQ HOSP IP/OBS MODERATE 35: CPT | Mod: GC

## 2024-10-01 PROCEDURE — 99232 SBSQ HOSP IP/OBS MODERATE 35: CPT

## 2024-10-01 RX ADMIN — FIDAXOMICIN 200 MILLIGRAM(S): 200 GRANULE, FOR SUSPENSION ORAL at 05:16

## 2024-10-01 RX ADMIN — METHYLPREDNISOLONE ACETATE 20 MILLIGRAM(S): 80 INJECTION, SUSPENSION INTRA-ARTICULAR; INTRALESIONAL; INTRAMUSCULAR; SOFT TISSUE at 05:15

## 2024-10-01 RX ADMIN — PANTOPRAZOLE SODIUM 40 MILLIGRAM(S): 40 TABLET, DELAYED RELEASE ORAL at 05:15

## 2024-10-01 RX ADMIN — METHYLPREDNISOLONE ACETATE 20 MILLIGRAM(S): 80 INJECTION, SUSPENSION INTRA-ARTICULAR; INTRALESIONAL; INTRAMUSCULAR; SOFT TISSUE at 12:16

## 2024-10-01 RX ADMIN — METHYLPREDNISOLONE ACETATE 20 MILLIGRAM(S): 80 INJECTION, SUSPENSION INTRA-ARTICULAR; INTRALESIONAL; INTRAMUSCULAR; SOFT TISSUE at 20:10

## 2024-10-01 RX ADMIN — FIDAXOMICIN 200 MILLIGRAM(S): 200 GRANULE, FOR SUSPENSION ORAL at 17:59

## 2024-10-01 NOTE — PROGRESS NOTE ADULT - PROBLEM SELECTOR PLAN 2
- positive Cdiff as outpatient, was initiated on fidaxomicin for 10d course - continue while admitted (2 doses remaining)  - ID recs appreciated   - monitor electrolytes  - contact precautions

## 2024-10-01 NOTE — PROGRESS NOTE ADULT - ASSESSMENT
33 yo M with pmhx of UC comes to the ED after experiencing worsening diarrhea for the past one month with outpatient stool studies positive for C Diff and initiation of fidaxomicin. On admission, labs significant for elevated WBC and CT A/P showing UC flare. GI consulted for further management    Impression:  #UC flare  #C diff+  #bloody diarrhea   - patient with hx of UC and trialed on multiple biologics and steroid taper  - also found to be C diff + outpatient   - overall would favor presentation to be UC flare due to bloody diarrhea, as C Diff does not usually cause hematochezia  - HBV serologies neg for HBV  - pending tx to [facility] for possible initiation of Rinvoq  - stool Cx neg  - clinical improvement with downtrending CRP    Recommendations:  - please send fecal calprotectin  - c/w solumedrol 20mg q8hrs  - pending tx to [facility] for possible initiation of Rinvoq (not available at [facility] for inpatient)   - please trend CBC, CMP, and CRP daily  - low residue diet  - f/u quant gold  - colorectal surgery consult as patient has had failed multiple UC therapies  - c/w DVT ppx as patients with UC flare are high risk for VTE  - continue with fidaxomicin (9/22 - ) for 10 day course per ID recs  - avoid opioids, anti-cholinergics, and anti-diarrheals as these can predispose to toxic megacolon  - avoid NSAIDs     All recommendations are preliminary until attending attestation.     Bentley Nichols, PGY-4  Gastroenterology & Hepatology Fellow  Available on TEAMS  Long range pager #: [phone]  Short range pager#: [phone]    For non-urgent consults, please send email to [email or web address] (for [facility]) or [email or web address] (for [facility])

## 2024-10-01 NOTE — PROGRESS NOTE ADULT - SUBJECTIVE AND OBJECTIVE BOX
GI Progress Note     SUBJECTIVE: Patient seen and examined at bedside.   - cancelled flex sig yesterday per [facility] IBD team's request; will do at [facility]  - feels similar to yesterday  - reports having had 10 BMs in the last 24 hrs but even less blood and more formed ([place] 5)   - pending tx to [facility]    OBJECTIVE:    VITAL SIGNS:  ICU Vital Signs Last 24 Hrs  T(C): 36.3 (28 Sep 2024 06:17), Max: 36.9 (27 Sep 2024 17:40)  T(F): 97.3 (28 Sep 2024 06:17), Max: 98.4 (27 Sep 2024 17:40)  HR: 95 (28 Sep 2024 06:17) (95 - 105)  BP: 135/87 (28 Sep 2024 06:17) (125/88 - 146/98)  BP(mean): --  ABP: --  ABP(mean): --  RR: 18 (28 Sep 2024 06:17) (18 - 18)  SpO2: 97% (28 Sep 2024 06:17) (97% - 100%)    O2 Parameters below as of 28 Sep 2024 06:17  Patient On (Oxygen Delivery Method): room air      PHYSICAL EXAM:    General: NAD  HEENT: NC/AT  Neck: supple  Respiratory: Regular RR, no increase in WOB  Cardiovascular: RRR  Abdomen: soft, NT/ND; +BS x4  Extremities: WWP, 2+ peripheral pulses b/l  Skin: normal color and turgor; no rash  Neurological: A&OX3    MEDICATIONS:  MEDICATIONS  (STANDING):  fidaxomicin 200 milliGRAM(s) Oral two times a day  influenza   Vaccine 0.5 milliLiter(s) IntraMuscular once  methylPREDNISolone sodium succinate Injectable 20 milliGRAM(s) IV Push every 8 hours  sodium chloride 0.9%. 1000 milliLiter(s) (100 mL/Hr) IV Continuous <Continuous>    MEDICATIONS  (PRN):      ALLERGIES:  Allergies    No Known Allergies    Intolerances    mesalamine (Other (Mod to Severe))      LABS:                        14.6   25.35 )-----------( 382      ( 27 Sep 2024 05:15 )             43.4     09-27    133[L]  |  98  |  6[L]  ----------------------------<  92  3.8   |  24  |  0.89    Ca    8.0[L]      27 Sep 2024 05:15  Phos  3.7     09-27  Mg     2.20     09-27    TPro  6.1  /  Alb  3.1[L]  /  TBili  0.3  /  DBili  x   /  AST  28  /  ALT  31  /  AlkPhos  57  09-27    PT/INR - ( 26 Sep 2024 18:35 )   PT: 12.9 sec;   INR: 1.11 ratio         PTT - ( 26 Sep 2024 18:35 )  PTT:28.3 sec  Urinalysis Basic - ( 27 Sep 2024 05:15 )    Color: x / Appearance: x / SG: x / pH: x  Gluc: 92 mg/dL / Ketone: x  / Bili: x / Urobili: x   Blood: x / Protein: x / Nitrite: x   Leuk Esterase: x / RBC: x / WBC x   Sq Epi: x / Non Sq Epi: x / Bacteria: x

## 2024-10-01 NOTE — PROGRESS NOTE ADULT - PROBLEM SELECTOR PLAN 3
Diet: regular  DVT: started as per GI   Dispo: Pending transfer to [facility]    If patient is cleared from GI perspective to initiate po steroids rather than IV, he may simply be discharged and present to [facility]. Otherwise, will await transfer.

## 2024-10-01 NOTE — PROGRESS NOTE ADULT - PROBLEM SELECTOR PLAN 1
- ongoing flare for 3 months notable for episodes of bloody diarrhea  - routinely follows with GI Dr Edwin Osullivan at [facility]; previously on Entyvio and recently switched to Stelara (s/p loading dose, 1 maintenance dose) - GI at [facility] are recommending transfer to their facility to possibly initiate JAK2 inhibitors  - currently receiving IV steroids (methylprednisolone 20mg q8h) - PPI ppx and glucose monitoring  - GI and ID following - c/w fidaxomicin for Cdiff as below, off cipro/flagyl  - GI deferred flex-sig  - trend H/H  - plan is to transfer to [facility] so patient can follow with his primary GI team and potentially initiate JAK2 inhibitors, i.e. Rinvoq

## 2024-10-01 NOTE — PROGRESS NOTE ADULT - SUBJECTIVE AND OBJECTIVE BOX
[facility] Division of Hospital Medicine  Brandin CrenshawDO  Available via MS Teams  In house pager [phone]    SUBJECTIVE / OVERNIGHT EVENTS:  No acute events overnight. Patient seen and examined at bedside this morning.  Did not undergo flex-sig yesterday.  Still with frequent stools, about the same amount of blood. Showed me pictures, he keeps a detailed log.    ADDITIONAL REVIEW OF SYSTEMS:    MEDICATIONS  (STANDING):  enoxaparin Injectable 40 milliGRAM(s) SubCutaneous every 24 hours  fidaxomicin 200 milliGRAM(s) Oral two times a day  influenza   Vaccine 0.5 milliLiter(s) IntraMuscular once  methylPREDNISolone sodium succinate Injectable 20 milliGRAM(s) IV Push every 8 hours  pantoprazole    Tablet 40 milliGRAM(s) Oral before breakfast  sodium chloride 0.9%. 1000 milliLiter(s) (100 mL/Hr) IV Continuous <Continuous>    MEDICATIONS  (PRN):      I&O's Summary    30 Sep 2024 07:01  -  01 Oct 2024 07:00  --------------------------------------------------------  IN: 600 mL / OUT: 400 mL / NET: 200 mL        PHYSICAL EXAM:  Vital Signs Last 24 Hrs  T(C): 36.6 (01 Oct 2024 10:30), Max: 36.8 (30 Sep 2024 22:17)  T(F): 97.8 (01 Oct 2024 10:30), Max: 98.2 (30 Sep 2024 22:17)  HR: 110 (01 Oct 2024 10:30) (105 - 110)  BP: 126/87 (01 Oct 2024 10:30) (125/87 - 141/88)  BP(mean): --  RR: 17 (01 Oct 2024 10:30) (17 - 18)  SpO2: 99% (01 Oct 2024 10:30) (97% - 100%)    Parameters below as of 01 Oct 2024 10:30  Patient On (Oxygen Delivery Method): room air          LABS:                        14.4   23.34 )-----------( 414      ( 30 Sep 2024 06:24 )             42.3     09-30    136  |  100  |  5[L]  ----------------------------<  109[H]  3.8   |  21[L]  |  0.81    Ca    8.4      30 Sep 2024 06:24  Phos  4.7     09-30  Mg     2.10     09-30            Urinalysis Basic - ( 30 Sep 2024 06:24 )    Color: x / Appearance: x / SG: x / pH: x  Gluc: 109 mg/dL / Ketone: x  / Bili: x / Urobili: x   Blood: x / Protein: x / Nitrite: x   Leuk Esterase: x / RBC: x / WBC x   Sq Epi: x / Non Sq Epi: x / Bacteria: x

## 2024-10-01 NOTE — CHART NOTE - NSCHARTNOTEFT_GEN_A_CORE
Patient started on VTE ppx per GI rec
Called [facility] to initiate transfer to [facility] under Dr Alexis Obregon.  Will need to fax over face sheet, progress notes, and most recent set of labs.
Faxed progress notes and recent set of labs to [facility] under Dr Alexis Obregon per request of Dr. Den Barrios PA-C  Department of Medicine  Pager #[phone]
Plan for flex/sig tomorrow AM   - As his stool is starting to form, would give 1-2 tap water enemas in the AM prior to coming down for colonoscopy   - NPO at [facility]   - AM labs with electrolyte correction   - Please assure the patient has functioning IVs prior to coming down for endoscopy     Berkley Vaughan MD  Gastroenterology/Hepatology Fellow, PGY-5  Please contact via TEAMS    NON-URGENT CONSULTS:  Please email [email or web address] OR  [email or web address]
Spoke with Dr. Obregon @ [facility] to sign out patient. Awaiting authorization for transfer. Will continue to monitor closely.

## 2024-10-01 NOTE — PROGRESS NOTE ADULT - ASSESSMENT
34M PMH ulcerative colitis following with Dr. Osullivan of [facility] presents with 8-10 episodes of bloody diarrhea during the day with further episodes of bloody diarrhea at night, advised to come in to hospital for treatment of UC flare.

## 2024-10-02 LAB
ADD ON TEST-SPECIMEN IN LAB: SIGNIFICANT CHANGE UP
ANION GAP SERPL CALC-SCNC: 14 MMOL/L — SIGNIFICANT CHANGE UP (ref 7–14)
BASOPHILS # BLD AUTO: 0.07 K/UL — SIGNIFICANT CHANGE UP (ref 0–0.2)
BASOPHILS NFR BLD AUTO: 0.3 % — SIGNIFICANT CHANGE UP (ref 0–2)
BUN SERPL-MCNC: 7 MG/DL — SIGNIFICANT CHANGE UP (ref 7–23)
CALCIUM SERPL-MCNC: 8.6 MG/DL — SIGNIFICANT CHANGE UP (ref 8.4–10.5)
CHLORIDE SERPL-SCNC: 96 MMOL/L — LOW (ref 98–107)
CO2 SERPL-SCNC: 23 MMOL/L — SIGNIFICANT CHANGE UP (ref 22–31)
CREAT SERPL-MCNC: 0.77 MG/DL — SIGNIFICANT CHANGE UP (ref 0.5–1.3)
CRP SERPL-MCNC: 9.4 MG/L — HIGH
CULTURE RESULTS: SIGNIFICANT CHANGE UP
CULTURE RESULTS: SIGNIFICANT CHANGE UP
EGFR: 122 ML/MIN/1.73M2 — SIGNIFICANT CHANGE UP
EOSINOPHIL # BLD AUTO: 0.53 K/UL — HIGH (ref 0–0.5)
EOSINOPHIL NFR BLD AUTO: 2.1 % — SIGNIFICANT CHANGE UP (ref 0–6)
GLUCOSE SERPL-MCNC: 77 MG/DL — SIGNIFICANT CHANGE UP (ref 70–99)
HCT VFR BLD CALC: 43.8 % — SIGNIFICANT CHANGE UP (ref 39–50)
HGB BLD-MCNC: 14.7 G/DL — SIGNIFICANT CHANGE UP (ref 13–17)
IANC: 13.94 K/UL — HIGH (ref 1.8–7.4)
IMM GRANULOCYTES NFR BLD AUTO: 0.7 % — SIGNIFICANT CHANGE UP (ref 0–0.9)
LYMPHOCYTES # BLD AUTO: 28.4 % — SIGNIFICANT CHANGE UP (ref 13–44)
LYMPHOCYTES # BLD AUTO: 7.06 K/UL — HIGH (ref 1–3.3)
MAGNESIUM SERPL-MCNC: 2.2 MG/DL — SIGNIFICANT CHANGE UP (ref 1.6–2.6)
MCHC RBC-ENTMCNC: 27.2 PG — SIGNIFICANT CHANGE UP (ref 27–34)
MCHC RBC-ENTMCNC: 33.6 GM/DL — SIGNIFICANT CHANGE UP (ref 32–36)
MCV RBC AUTO: 81 FL — SIGNIFICANT CHANGE UP (ref 80–100)
MONOCYTES # BLD AUTO: 3.1 K/UL — HIGH (ref 0–0.9)
MONOCYTES NFR BLD AUTO: 12.5 % — SIGNIFICANT CHANGE UP (ref 2–14)
NEUTROPHILS # BLD AUTO: 13.94 K/UL — HIGH (ref 1.8–7.4)
NEUTROPHILS NFR BLD AUTO: 56 % — SIGNIFICANT CHANGE UP (ref 43–77)
NRBC # BLD: 0 /100 WBCS — SIGNIFICANT CHANGE UP (ref 0–0)
NRBC # FLD: 0 K/UL — SIGNIFICANT CHANGE UP (ref 0–0)
PHOSPHATE SERPL-MCNC: 4.7 MG/DL — HIGH (ref 2.5–4.5)
PLATELET # BLD AUTO: 469 K/UL — HIGH (ref 150–400)
POTASSIUM SERPL-MCNC: 4.1 MMOL/L — SIGNIFICANT CHANGE UP (ref 3.5–5.3)
POTASSIUM SERPL-SCNC: 4.1 MMOL/L — SIGNIFICANT CHANGE UP (ref 3.5–5.3)
RBC # BLD: 5.41 M/UL — SIGNIFICANT CHANGE UP (ref 4.2–5.8)
RBC # FLD: 12.8 % — SIGNIFICANT CHANGE UP (ref 10.3–14.5)
SODIUM SERPL-SCNC: 133 MMOL/L — LOW (ref 135–145)
SPECIMEN SOURCE: SIGNIFICANT CHANGE UP
SPECIMEN SOURCE: SIGNIFICANT CHANGE UP
WBC # BLD: 24.88 K/UL — HIGH (ref 3.8–10.5)
WBC # FLD AUTO: 24.88 K/UL — HIGH (ref 3.8–10.5)

## 2024-10-02 PROCEDURE — 99232 SBSQ HOSP IP/OBS MODERATE 35: CPT | Mod: GC

## 2024-10-02 PROCEDURE — 99232 SBSQ HOSP IP/OBS MODERATE 35: CPT

## 2024-10-02 RX ORDER — INFLIXIMAB-DYYB 120 MG/ML
500 INJECTION SUBCUTANEOUS ONCE
Refills: 0 | Status: COMPLETED | OUTPATIENT
Start: 2024-10-02 | End: 2024-10-02

## 2024-10-02 RX ADMIN — FIDAXOMICIN 200 MILLIGRAM(S): 200 GRANULE, FOR SUSPENSION ORAL at 05:04

## 2024-10-02 RX ADMIN — FIDAXOMICIN 200 MILLIGRAM(S): 200 GRANULE, FOR SUSPENSION ORAL at 17:51

## 2024-10-02 RX ADMIN — METHYLPREDNISOLONE ACETATE 20 MILLIGRAM(S): 80 INJECTION, SUSPENSION INTRA-ARTICULAR; INTRALESIONAL; INTRAMUSCULAR; SOFT TISSUE at 11:07

## 2024-10-02 RX ADMIN — PANTOPRAZOLE SODIUM 40 MILLIGRAM(S): 40 TABLET, DELAYED RELEASE ORAL at 05:34

## 2024-10-02 RX ADMIN — METHYLPREDNISOLONE ACETATE 20 MILLIGRAM(S): 80 INJECTION, SUSPENSION INTRA-ARTICULAR; INTRALESIONAL; INTRAMUSCULAR; SOFT TISSUE at 04:50

## 2024-10-02 RX ADMIN — INFLIXIMAB-DYYB 125 MILLIGRAM(S): 120 INJECTION SUBCUTANEOUS at 16:40

## 2024-10-02 RX ADMIN — METHYLPREDNISOLONE ACETATE 20 MILLIGRAM(S): 80 INJECTION, SUSPENSION INTRA-ARTICULAR; INTRALESIONAL; INTRAMUSCULAR; SOFT TISSUE at 20:44

## 2024-10-02 NOTE — PROGRESS NOTE ADULT - ASSESSMENT
34M PMH ulcerative colitis following with Dr. Osullivan of [facility] presents with 8-10 episodes of bloody diarrhea during the day with further episodes of bloody diarrhea at night, advised to come in to hospital for treatment of UC flare.

## 2024-10-02 NOTE — PROGRESS NOTE ADULT - PROBLEM SELECTOR PLAN 1
- ongoing flare for 3 months notable for episodes of bloody diarrhea  - routinely follows with GI Dr Edwin Osullivan at [facility]; previously on Entyvio and recently switched to Stelara (s/p loading dose, 1 maintenance dose) - GI at [facility] are recommending transfer to their facility to possibly initiate JAK2 inhibitors  - currently receiving IV steroids (methylprednisolone 20mg q8h) - PPI ppx and glucose monitoring  - GI and ID following - c/w fidaxomicin for Cdiff as below, off cipro/flagyl  - GI deferred flex-sig  - trend H/H  - plan is to transfer to [facility] so patient can follow with his primary GI team and potentially initiate JAK2 inhibitors, i.e. Rinvoq

## 2024-10-02 NOTE — PROGRESS NOTE ADULT - SUBJECTIVE AND OBJECTIVE BOX
[facility] Division of [facility] Medicine  Brandin CrenshawDO  Available via MS Teams  In house pager [phone]    SUBJECTIVE / OVERNIGHT EVENTS:  No acute events overnight. Patient seen and examined at bedside this morning.  Reports a little more blood in stool in the past day.  Eagerly awaiting transfer to [facility].  Family at bedside.    ADDITIONAL REVIEW OF SYSTEMS:    MEDICATIONS  (STANDING):  enoxaparin Injectable 40 milliGRAM(s) SubCutaneous every 24 hours  fidaxomicin 200 milliGRAM(s) Oral two times a day  influenza   Vaccine 0.5 milliLiter(s) IntraMuscular once  methylPREDNISolone sodium succinate Injectable 20 milliGRAM(s) IV Push every 8 hours  pantoprazole    Tablet 40 milliGRAM(s) Oral before breakfast  sodium chloride 0.9%. 1000 milliLiter(s) (100 mL/Hr) IV Continuous <Continuous>    MEDICATIONS  (PRN):      I&O's Summary      PHYSICAL EXAM:  Vital Signs Last 24 Hrs  T(C): 36.7 (02 Oct 2024 05:01), Max: 36.8 (01 Oct 2024 14:30)  T(F): 98 (02 Oct 2024 05:01), Max: 98.2 (01 Oct 2024 14:30)  HR: 104 (02 Oct 2024 05:01) (102 - 119)  BP: 114/76 (02 Oct 2024 05:01) (114/76 - 138/93)  BP(mean): --  RR: 18 (02 Oct 2024 05:01) (18 - 18)  SpO2: 99% (02 Oct 2024 05:01) (99% - 100%)    Parameters below as of 02 Oct 2024 05:01  Patient On (Oxygen Delivery Method): room air      LABS:                        14.7   24.88 )-----------( 469      ( 02 Oct 2024 05:00 )             43.8     10-02    133[L]  |  96[L]  |  7   ----------------------------<  77  4.1   |  23  |  0.77    Ca    8.6      02 Oct 2024 05:00  Phos  4.7     10-02  Mg     2.20     10-02            Urinalysis Basic - ( 02 Oct 2024 05:00 )    Color: x / Appearance: x / SG: x / pH: x  Gluc: 77 mg/dL / Ketone: x  / Bili: x / Urobili: x   Blood: x / Protein: x / Nitrite: x   Leuk Esterase: x / RBC: x / WBC x   Sq Epi: x / Non Sq Epi: x / Bacteria: x

## 2024-10-02 NOTE — PROGRESS NOTE ADULT - SUBJECTIVE AND OBJECTIVE BOX
GI Progress Note     SUBJECTIVE: Patient seen and examined at bedside.   - per patient, feeling the same or a little worse today; has had 10-15 BMs in last 24 hrs, more blood and loose  - spoke with primary team; pending tx to [facility] and may be a few days  - HBV serologies negative; quant gold was cancelled but per [facility] IBD team, last quant gold was 7/10/24 and was negative  - spoke with [facility] IBD team this AM; given the delayed transfer process, should initiate remicade given no significant clinical improvement. Patient amenable.     OBJECTIVE:    VITAL SIGNS:  ICU Vital Signs Last 24 Hrs  T(C): 36.3 (28 Sep 2024 06:17), Max: 36.9 (27 Sep 2024 17:40)  T(F): 97.3 (28 Sep 2024 06:17), Max: 98.4 (27 Sep 2024 17:40)  HR: 95 (28 Sep 2024 06:17) (95 - 105)  BP: 135/87 (28 Sep 2024 06:17) (125/88 - 146/98)  BP(mean): --  ABP: --  ABP(mean): --  RR: 18 (28 Sep 2024 06:17) (18 - 18)  SpO2: 97% (28 Sep 2024 06:17) (97% - 100%)    O2 Parameters below as of 28 Sep 2024 06:17  Patient On (Oxygen Delivery Method): room air      PHYSICAL EXAM:    General: NAD  HEENT: NC/AT  Neck: supple  Respiratory: Regular RR, no increase in WOB  Cardiovascular: RRR  Abdomen: soft, NT/ND; +BS x4  Extremities: WWP, 2+ peripheral pulses b/l  Skin: normal color and turgor; no rash  Neurological: A&OX3    MEDICATIONS:  MEDICATIONS  (STANDING):  fidaxomicin 200 milliGRAM(s) Oral two times a day  influenza   Vaccine 0.5 milliLiter(s) IntraMuscular once  methylPREDNISolone sodium succinate Injectable 20 milliGRAM(s) IV Push every 8 hours  sodium chloride 0.9%. 1000 milliLiter(s) (100 mL/Hr) IV Continuous <Continuous>    MEDICATIONS  (PRN):      ALLERGIES:  Allergies    No Known Allergies    Intolerances    mesalamine (Other (Mod to Severe))      LABS:                        14.6   25.35 )-----------( 382      ( 27 Sep 2024 05:15 )             43.4     09-27    133[L]  |  98  |  6[L]  ----------------------------<  92  3.8   |  24  |  0.89    Ca    8.0[L]      27 Sep 2024 05:15  Phos  3.7     09-27  Mg     2.20     09-27    TPro  6.1  /  Alb  3.1[L]  /  TBili  0.3  /  DBili  x   /  AST  28  /  ALT  31  /  AlkPhos  57  09-27    PT/INR - ( 26 Sep 2024 18:35 )   PT: 12.9 sec;   INR: 1.11 ratio         PTT - ( 26 Sep 2024 18:35 )  PTT:28.3 sec  Urinalysis Basic - ( 27 Sep 2024 05:15 )    Color: x / Appearance: x / SG: x / pH: x  Gluc: 92 mg/dL / Ketone: x  / Bili: x / Urobili: x   Blood: x / Protein: x / Nitrite: x   Leuk Esterase: x / RBC: x / WBC x   Sq Epi: x / Non Sq Epi: x / Bacteria: x

## 2024-10-02 NOTE — PROGRESS NOTE ADULT - PROBLEM SELECTOR PLAN 3
Diet: regular  DVT: started as per GI   Dispo: Pending transfer to [facility]    If patient is cleared from GI perspective to initiate po steroids rather than IV, he may simply be discharged and present to [facility]. Otherwise, will await transfer.

## 2024-10-02 NOTE — PROGRESS NOTE ADULT - ASSESSMENT
35 yo M with pmhx of UC comes to the ED after experiencing worsening diarrhea for the past one month with outpatient stool studies positive for C Diff and initiation of fidaxomicin. On admission, labs significant for elevated WBC and CT A/P showing UC flare. GI consulted for further management    Impression:  #UC flare  #C diff+  #bloody diarrhea   - patient with hx of UC and trialed on multiple biologics and steroid taper  - also found to be C diff + outpatient   - overall would favor presentation to be UC flare due to bloody diarrhea, as C Diff does not usually cause hematochezia  - HBV serologies neg for HBV  - pending tx to [facility] for possible initiation of Rinvoq  - stool Cx neg  - no significant clinical improvement after 3 days of IV steroids and still remains in flare; after extensive discussion with [facility] IBD team and patient, will give remicade today    Recommendations:  - please send fecal calprotectin  - ordered for remicade 500mg IV x 1 today  - c/w solumedrol 20mg q8hrs  - pending tx to [facility]; please keep the patient on the transfer list in case patient fails remicade and needs Rinvoq (not available at [facility] for inpatient)   - please trend CBC, CMP, and CRP daily  - low residue diet  - colorectal surgery consult as patient has had failed multiple UC therapies  - c/w DVT ppx as patients with UC flare are high risk for VTE  - continue with fidaxomicin (9/22 - ) for 10 day course per ID recs  - avoid opioids, anti-cholinergics, and anti-diarrheals as these can predispose to toxic megacolon  - avoid NSAIDs     All recommendations are preliminary until attending attestation.     Bentley Nichols, PGY-4  Gastroenterology & Hepatology Fellow  Available on TEAMS  Long range pager #: [phone]  Short range pager#: [phone]    For non-urgent consults, please send email to [email or web address] (for [facility]) or [email or web address] (for [facility])

## 2024-10-03 LAB
ALBUMIN SERPL ELPH-MCNC: 3.1 G/DL — LOW (ref 3.3–5)
ALP SERPL-CCNC: 63 U/L — SIGNIFICANT CHANGE UP (ref 40–120)
ALT FLD-CCNC: 37 U/L — SIGNIFICANT CHANGE UP (ref 4–41)
ANION GAP SERPL CALC-SCNC: 11 MMOL/L — SIGNIFICANT CHANGE UP (ref 7–14)
AST SERPL-CCNC: 16 U/L — SIGNIFICANT CHANGE UP (ref 4–40)
BILIRUB SERPL-MCNC: 0.3 MG/DL — SIGNIFICANT CHANGE UP (ref 0.2–1.2)
BUN SERPL-MCNC: 8 MG/DL — SIGNIFICANT CHANGE UP (ref 7–23)
CALCIUM SERPL-MCNC: 8.4 MG/DL — SIGNIFICANT CHANGE UP (ref 8.4–10.5)
CHLORIDE SERPL-SCNC: 100 MMOL/L — SIGNIFICANT CHANGE UP (ref 98–107)
CO2 SERPL-SCNC: 24 MMOL/L — SIGNIFICANT CHANGE UP (ref 22–31)
CREAT SERPL-MCNC: 0.8 MG/DL — SIGNIFICANT CHANGE UP (ref 0.5–1.3)
CRP SERPL-MCNC: 10.5 MG/L — HIGH
EGFR: 121 ML/MIN/1.73M2 — SIGNIFICANT CHANGE UP
GLUCOSE SERPL-MCNC: 100 MG/DL — HIGH (ref 70–99)
HCT VFR BLD CALC: 42.5 % — SIGNIFICANT CHANGE UP (ref 39–50)
HGB BLD-MCNC: 14.4 G/DL — SIGNIFICANT CHANGE UP (ref 13–17)
MCHC RBC-ENTMCNC: 27.7 PG — SIGNIFICANT CHANGE UP (ref 27–34)
MCHC RBC-ENTMCNC: 33.9 GM/DL — SIGNIFICANT CHANGE UP (ref 32–36)
MCV RBC AUTO: 81.7 FL — SIGNIFICANT CHANGE UP (ref 80–100)
NRBC # BLD: 0 /100 WBCS — SIGNIFICANT CHANGE UP (ref 0–0)
NRBC # FLD: 0 K/UL — SIGNIFICANT CHANGE UP (ref 0–0)
PLATELET # BLD AUTO: 445 K/UL — HIGH (ref 150–400)
POTASSIUM SERPL-MCNC: 3.8 MMOL/L — SIGNIFICANT CHANGE UP (ref 3.5–5.3)
POTASSIUM SERPL-SCNC: 3.8 MMOL/L — SIGNIFICANT CHANGE UP (ref 3.5–5.3)
PROT SERPL-MCNC: 6.3 G/DL — SIGNIFICANT CHANGE UP (ref 6–8.3)
RBC # BLD: 5.2 M/UL — SIGNIFICANT CHANGE UP (ref 4.2–5.8)
RBC # FLD: 12.9 % — SIGNIFICANT CHANGE UP (ref 10.3–14.5)
SODIUM SERPL-SCNC: 135 MMOL/L — SIGNIFICANT CHANGE UP (ref 135–145)
WBC # BLD: 21.83 K/UL — HIGH (ref 3.8–10.5)
WBC # FLD AUTO: 21.83 K/UL — HIGH (ref 3.8–10.5)

## 2024-10-03 PROCEDURE — 99232 SBSQ HOSP IP/OBS MODERATE 35: CPT

## 2024-10-03 PROCEDURE — 99232 SBSQ HOSP IP/OBS MODERATE 35: CPT | Mod: GC

## 2024-10-03 PROCEDURE — 88305 TISSUE EXAM BY PATHOLOGIST: CPT | Mod: 26

## 2024-10-03 PROCEDURE — 88342 IMHCHEM/IMCYTCHM 1ST ANTB: CPT | Mod: 26

## 2024-10-03 RX ADMIN — METHYLPREDNISOLONE ACETATE 20 MILLIGRAM(S): 80 INJECTION, SUSPENSION INTRA-ARTICULAR; INTRALESIONAL; INTRAMUSCULAR; SOFT TISSUE at 17:24

## 2024-10-03 RX ADMIN — FIDAXOMICIN 200 MILLIGRAM(S): 200 GRANULE, FOR SUSPENSION ORAL at 06:58

## 2024-10-03 RX ADMIN — METHYLPREDNISOLONE ACETATE 20 MILLIGRAM(S): 80 INJECTION, SUSPENSION INTRA-ARTICULAR; INTRALESIONAL; INTRAMUSCULAR; SOFT TISSUE at 04:59

## 2024-10-03 RX ADMIN — PANTOPRAZOLE SODIUM 40 MILLIGRAM(S): 40 TABLET, DELAYED RELEASE ORAL at 06:57

## 2024-10-03 NOTE — DIETITIAN INITIAL EVALUATION ADULT - PERTINENT LABORATORY DATA
10-03    135  |  100  |  8   ----------------------------<  100[H]  3.8   |  24  |  0.80    Ca    8.4      03 Oct 2024 06:45  Phos  4.7     10-02  Mg     2.20     10-02    TPro  6.3  /  Alb  3.1[L]  /  TBili  0.3  /  DBili  x   /  AST  16  /  ALT  37  /  AlkPhos  63  10-03

## 2024-10-03 NOTE — ASU PREOP CHECKLIST - TAMPON REMOVED
[Normal] : No focal neurologic defects observed [de-identified] : incisions c/d/i [Fully active, able to carry on all pre-disease performance without restriction] : Status 0 - Fully active, able to carry on all pre-disease performance without restriction n/a

## 2024-10-03 NOTE — DIETITIAN INITIAL EVALUATION ADULT - NS FNS DIET ORDER
Diet, NPO after Midnight:      NPO Start Date: 02-Oct-2024,   NPO Start Time: 23:59  Except Medications (10-03-24 @ 06:20)

## 2024-10-03 NOTE — ASU PREOP CHECKLIST - PATIENT'S PERSONAL PROPERTY REMOVED
none -on hydralazine, norvasc, proscar and clonidine  -Since pt NPO will start on iv metoprolol  Monitor BP and restart medications as needed

## 2024-10-03 NOTE — PROGRESS NOTE ADULT - PROBLEM SELECTOR PLAN 1
- ongoing flare for 3 months notable for episodes of bloody diarrhea  - routinely follows with GI Dr Edwin Osullivan at [facility]; previously on Entyvio and recently switched to Stelara (s/p loading dose, 1 maintenance dose) - GI at [facility] are recommending transfer to their facility to possibly initiate JAK2 inhibitors  - currently receiving IV steroids (methylprednisolone 20mg q8h) - PPI ppx and glucose monitoring  - GI and ID following - s/p fidaxomicin for Cdiff  - GI planning for flex-sig 10/3  - trend H/H  - plan was to transfer to [facility] so patient can follow with his primary GI team and potentially initiate JAK2 inhibitors, i.e. Rinvoq; now seemingly cancelled

## 2024-10-03 NOTE — PROGRESS NOTE ADULT - ASSESSMENT
34M PMH ulcerative colitis following with Dr. Osullivan of [facility] presents with 8-10 episodes of bloody diarrhea during the day with further episodes of bloody diarrhea at night, advised to come in to hospital for treatment of UC flare.

## 2024-10-03 NOTE — DIETITIAN INITIAL EVALUATION ADULT - ADD RECOMMEND
1) Recommend when medically feasible resume diet to Low Fiber and No Conc. Phos diet.   2) Monitor PO intake, Labs, weights, BMs, and skin integrity.   3) RD to remain available for further nutritional interventions as indicated.

## 2024-10-03 NOTE — DIETITIAN INITIAL EVALUATION ADULT - ORAL INTAKE PTA/DIET HISTORY
Attempted to visit pt multiple times pt off the floor as per RN. Comprehensive chart review completed, pt adm with ulcerative colitis with at least 8-10 episodes of bloody diarrhea as per H&P note.

## 2024-10-03 NOTE — DIETITIAN INITIAL EVALUATION ADULT - PROBLEM SELECTOR PLAN 1
-pt with Hx UC presenting with ongoing flare for 3 months notable for episodes of bloody diarrhea  -follows primarily with [facility], was previously on entyvio but switched to stelara 1 month ago s/p loading dose and 1 maintenance dose  -currently on prednisone 40 daily  -will c/w cipro/flagyl at this time  -GI consult for evaluation of symptoms and need for IV steroids, emailed  -monitor Hgb, currently stable

## 2024-10-03 NOTE — DIETITIAN INITIAL EVALUATION ADULT - OTHER INFO
Per chart review, 34M PMH ulcerative colitis following with Dr. Osullivan of [facility] presents with 8-10 episodes of bloody diarrhea during the day with further episodes of bloody diarrhea at night, advised to come in to hospital for treatment of UC flare.    Patient unavailable at the time of visit, off the unit for procedure. Pt noted on regular diet, able to consume % of his meals in hospital. As per chart review, pt with worsened diarrhea, GI is following  planning for flex-sig on 10/3, NPO in placed for tonight. Labs notable with elevated Phos 4.7H. Recommend add low fiber, and No Conc Phos diet. Pt's previous weight as per HIE: 189.1lbs(6/1/21). Most adm weight 179.1 (9/26/24). Weight trend reflects 10lbs weight loss x  3 years which is clinical insignificant. RD to remain available for further nutritional interventions as indicated.

## 2024-10-03 NOTE — PROGRESS NOTE ADULT - SUBJECTIVE AND OBJECTIVE BOX
[facility] Division of Hospital Medicine  Brandin CrenshawDO  Available via MS Teams  In house pager [phone]    SUBJECTIVE / OVERNIGHT EVENTS:  No acute events overnight. Patient seen and examined at bedside this morning.  Still with ongoing loose stools.   Completed fidaxomicin yesterday.  Received infliximab yesterday. Patient tells me transfer to [facility] was cancelled.     Planned for flex-sig today.    ADDITIONAL REVIEW OF SYSTEMS:    MEDICATIONS  (STANDING):  enoxaparin Injectable 40 milliGRAM(s) SubCutaneous every 24 hours  fidaxomicin 200 milliGRAM(s) Oral two times a day  influenza   Vaccine 0.5 milliLiter(s) IntraMuscular once  methylPREDNISolone sodium succinate Injectable 20 milliGRAM(s) IV Push every 8 hours  pantoprazole    Tablet 40 milliGRAM(s) Oral before breakfast  sodium chloride 0.9%. 1000 milliLiter(s) (100 mL/Hr) IV Continuous <Continuous>    MEDICATIONS  (PRN):      I&O's Summary      PHYSICAL EXAM:  Vital Signs Last 24 Hrs  T(C): 36.4 (03 Oct 2024 11:05), Max: 36.9 (02 Oct 2024 16:35)  T(F): 97.5 (03 Oct 2024 11:05), Max: 98.5 (02 Oct 2024 16:35)  HR: 109 (03 Oct 2024 11:05) (90 - 109)  BP: 122/74 (03 Oct 2024 11:05) (115/77 - 132/84)  BP(mean): --  RR: 17 (03 Oct 2024 11:05) (16 - 18)  SpO2: 98% (03 Oct 2024 11:05) (96% - 98%)    Parameters below as of 03 Oct 2024 10:37  Patient On (Oxygen Delivery Method): room air        LABS:                        14.4   21.83 )-----------( 445      ( 03 Oct 2024 06:45 )             42.5     10-03    135  |  100  |  8   ----------------------------<  100[H]  3.8   |  24  |  0.80    Ca    8.4      03 Oct 2024 06:45  Phos  4.7     10-02  Mg     2.20     10-02    TPro  6.3  /  Alb  3.1[L]  /  TBili  0.3  /  DBili  x   /  AST  16  /  ALT  37  /  AlkPhos  63  10-03          Urinalysis Basic - ( 03 Oct 2024 06:45 )    Color: x / Appearance: x / SG: x / pH: x  Gluc: 100 mg/dL / Ketone: x  / Bili: x / Urobili: x   Blood: x / Protein: x / Nitrite: x   Leuk Esterase: x / RBC: x / WBC x   Sq Epi: x / Non Sq Epi: x / Bacteria: x

## 2024-10-03 NOTE — CONSULT NOTE ADULT - ASSESSMENT
35 y/o M with pmhx of UC comes to the ED with UC flare and C Diff. On admission, labs significant for elevated WBC and CT A/P showing UC flare. Patient follows with GI (Abran) at [facility], came here due to living close to hospital. Patient has been on Entyvio but was recently switched to [facility] outpatient. Inpatient, he was treated for C. Diff and started on steroids. He has had persistent bloody diarrhea through steroids so Remicade was started 10/3. Patient also received flex sig 10/3. Surgery consulted to establish care.     Plan:   - Continue medical management for UC  - Will follow up results of flex sig   - Surgery to follow     A Team Surgery   [id number]      35 y/o M with pmhx of UC comes to the ED with UC flare and C Diff. On admission, labs significant for elevated WBC and CT A/P showing UC flare. Patient follows with GI (Abran) at [facility], came here due to living close to hospital. Patient has been on Entyvio but was recently switched to [facility] outpatient. Inpatient, he was treated for C. Diff and started on steroids. He has had persistent bloody diarrhea through steroids so Remicade was started 10/3. Patient also received flex sig 10/3. Surgery consulted to establish care.     Plan:   - Continue medical management for UC  - Will follow up results of flex sig   - Surgery to follow     Discussed with attending on call, Dr. Aly DEL CID Team Surgery   [id number]

## 2024-10-03 NOTE — CONSULT NOTE ADULT - SUBJECTIVE AND OBJECTIVE BOX
Surgery Consult Note  Attending: MD Aly  Service: Colorectal Surgery    HPI: 35 yo M with pmhx of UC comes to the ED after experiencing worsening diarrhea for the past one month with outpatient stool studies positive for C Diff and initiation of fidaxomicin. On admission, labs significant for elevated WBC and CT A/P showing UC flare. Patient follows with GI (Abran) at [facility], came here due to living close to hospital. Patient has been on Entyvio but was recently switched to [facility] outpatient. Inpatient, he was treated for C. Diff and started on steroids. He has had persistent bloody diarrhea through steroids so Remicade was started 10/3. Patient also received flex sig 10/3. Surgery consulted to establish care.       PAST MEDICAL HISTORY:  PAST MEDICAL & SURGICAL HISTORY:  Ulcerative colitis      No significant past surgical history          ALLERGIES:  Allergies    No Known Allergies    Intolerances    mesalamine (Other (Mod to Severe))      SOCIAL HISTORY: Negative for tobacco, etoh, or drug use    FAMILY HISTORY:  FAMILY HISTORY:  No pertinent family history in first degree relatives    PHYSICAL EXAM:  General: NAD, walking around room in street clothes   HEENT: NC/AT  Pulmonary: normal resp effort  Cardiovascular: NSR  Abdominal: soft, ND/NT, no organomegaly, no guarding or rebound tenderness  Extremities: WWP  Neuro: A/O x 3    VITAL SIGNS:  Vital Signs Last 24 Hrs  T(C): 36.3 (03 Oct 2024 13:15), Max: 36.7 (02 Oct 2024 21:18)  T(F): 97.3 (03 Oct 2024 13:15), Max: 98.1 (02 Oct 2024 21:18)  HR: 90 (03 Oct 2024 13:45) (90 - 109)  BP: 121/71 (03 Oct 2024 13:45) (103/56 - 132/84)  BP(mean): --  RR: 12 (03 Oct 2024 13:45) (12 - 20)  SpO2: 98% (03 Oct 2024 13:45) (96% - 98%)    Parameters below as of 03 Oct 2024 13:45  Patient On (Oxygen Delivery Method): room air        I&O's Summary      LABS:                        14.4   21.83 )-----------( 445      ( 03 Oct 2024 06:45 )             42.5     10-03    135  |  100  |  8   ----------------------------<  100[H]  3.8   |  24  |  0.80    Ca    8.4      03 Oct 2024 06:45  Phos  4.7     10-02  Mg     2.20     10-02    TPro  6.3  /  Alb  3.1[L]  /  TBili  0.3  /  DBili  x   /  AST  16  /  ALT  37  /  AlkPhos  63  10-03      Urinalysis Basic - ( 03 Oct 2024 06:45 )    Color: x / Appearance: x / SG: x / pH: x  Gluc: 100 mg/dL / Ketone: x  / Bili: x / Urobili: x   Blood: x / Protein: x / Nitrite: x   Leuk Esterase: x / RBC: x / WBC x   Sq Epi: x / Non Sq Epi: x / Bacteria: x      CAPILLARY BLOOD GLUCOSE        LIVER FUNCTIONS - ( 03 Oct 2024 06:45 )  Alb: 3.1 g/dL / Pro: 6.3 g/dL / ALK PHOS: 63 U/L / ALT: 37 U/L / AST: 16 U/L / GGT: x

## 2024-10-03 NOTE — PROGRESS NOTE ADULT - PROBLEM SELECTOR PLAN 2
- positive Cdiff as outpatient, was initiated on fidaxomicin for 10d course - completed  - ID recs appreciated   - monitor electrolytes  - contact precautions - unable to remove per infection control due to ongoing symptoms (though may be attributable to UC flare rather than Cdiff)

## 2024-10-03 NOTE — DIETITIAN INITIAL EVALUATION ADULT - PERTINENT MEDS FT
MEDICATIONS  (STANDING):  enoxaparin Injectable 40 milliGRAM(s) SubCutaneous every 24 hours  influenza   Vaccine 0.5 milliLiter(s) IntraMuscular once  methylPREDNISolone sodium succinate Injectable 20 milliGRAM(s) IV Push every 8 hours  pantoprazole    Tablet 40 milliGRAM(s) Oral before breakfast  sodium chloride 0.9%. 1000 milliLiter(s) (100 mL/Hr) IV Continuous <Continuous>    MEDICATIONS  (PRN):

## 2024-10-04 LAB
ADD ON TEST-SPECIMEN IN LAB: SIGNIFICANT CHANGE UP
ANION GAP SERPL CALC-SCNC: 11 MMOL/L — SIGNIFICANT CHANGE UP (ref 7–14)
BUN SERPL-MCNC: 10 MG/DL — SIGNIFICANT CHANGE UP (ref 7–23)
CALCIUM SERPL-MCNC: 8.4 MG/DL — SIGNIFICANT CHANGE UP (ref 8.4–10.5)
CHLORIDE SERPL-SCNC: 99 MMOL/L — SIGNIFICANT CHANGE UP (ref 98–107)
CO2 SERPL-SCNC: 23 MMOL/L — SIGNIFICANT CHANGE UP (ref 22–31)
CREAT SERPL-MCNC: 0.79 MG/DL — SIGNIFICANT CHANGE UP (ref 0.5–1.3)
CRP SERPL-MCNC: 12.8 MG/L — HIGH
EGFR: 121 ML/MIN/1.73M2 — SIGNIFICANT CHANGE UP
GLUCOSE SERPL-MCNC: 113 MG/DL — HIGH (ref 70–99)
HCT VFR BLD CALC: 41.9 % — SIGNIFICANT CHANGE UP (ref 39–50)
HGB BLD-MCNC: 14.1 G/DL — SIGNIFICANT CHANGE UP (ref 13–17)
MAGNESIUM SERPL-MCNC: 2.3 MG/DL — SIGNIFICANT CHANGE UP (ref 1.6–2.6)
MCHC RBC-ENTMCNC: 27.7 PG — SIGNIFICANT CHANGE UP (ref 27–34)
MCHC RBC-ENTMCNC: 33.7 GM/DL — SIGNIFICANT CHANGE UP (ref 32–36)
MCV RBC AUTO: 82.3 FL — SIGNIFICANT CHANGE UP (ref 80–100)
NRBC # BLD: 0 /100 WBCS — SIGNIFICANT CHANGE UP (ref 0–0)
NRBC # FLD: 0 K/UL — SIGNIFICANT CHANGE UP (ref 0–0)
PHOSPHATE SERPL-MCNC: 3.4 MG/DL — SIGNIFICANT CHANGE UP (ref 2.5–4.5)
PLATELET # BLD AUTO: 463 K/UL — HIGH (ref 150–400)
POTASSIUM SERPL-MCNC: 4.1 MMOL/L — SIGNIFICANT CHANGE UP (ref 3.5–5.3)
POTASSIUM SERPL-SCNC: 4.1 MMOL/L — SIGNIFICANT CHANGE UP (ref 3.5–5.3)
RBC # BLD: 5.09 M/UL — SIGNIFICANT CHANGE UP (ref 4.2–5.8)
RBC # FLD: 12.8 % — SIGNIFICANT CHANGE UP (ref 10.3–14.5)
SODIUM SERPL-SCNC: 133 MMOL/L — LOW (ref 135–145)
WBC # BLD: 21.11 K/UL — HIGH (ref 3.8–10.5)
WBC # FLD AUTO: 21.11 K/UL — HIGH (ref 3.8–10.5)

## 2024-10-04 PROCEDURE — 99233 SBSQ HOSP IP/OBS HIGH 50: CPT

## 2024-10-04 PROCEDURE — 99232 SBSQ HOSP IP/OBS MODERATE 35: CPT | Mod: GC

## 2024-10-04 PROCEDURE — 99232 SBSQ HOSP IP/OBS MODERATE 35: CPT

## 2024-10-04 RX ADMIN — METHYLPREDNISOLONE ACETATE 20 MILLIGRAM(S): 80 INJECTION, SUSPENSION INTRA-ARTICULAR; INTRALESIONAL; INTRAMUSCULAR; SOFT TISSUE at 17:16

## 2024-10-04 RX ADMIN — PANTOPRAZOLE SODIUM 40 MILLIGRAM(S): 40 TABLET, DELAYED RELEASE ORAL at 05:56

## 2024-10-04 RX ADMIN — METHYLPREDNISOLONE ACETATE 20 MILLIGRAM(S): 80 INJECTION, SUSPENSION INTRA-ARTICULAR; INTRALESIONAL; INTRAMUSCULAR; SOFT TISSUE at 11:11

## 2024-10-04 RX ADMIN — METHYLPREDNISOLONE ACETATE 20 MILLIGRAM(S): 80 INJECTION, SUSPENSION INTRA-ARTICULAR; INTRALESIONAL; INTRAMUSCULAR; SOFT TISSUE at 02:04

## 2024-10-04 NOTE — PROGRESS NOTE ADULT - SUBJECTIVE AND OBJECTIVE BOX
Follow Up:      Inverval History/ROS:Patient is a 34y old  Male who presents with a chief complaint of UC flare w/ bloody diarrhea (04 Oct 2024 14:53)    + bloody diarrhea  No fever    Allergies    No Known Allergies    Intolerances    mesalamine (Other (Mod to Severe))      ANTIMICROBIALS:      OTHER MEDS:  enoxaparin Injectable 40 milliGRAM(s) SubCutaneous every 24 hours  influenza   Vaccine 0.5 milliLiter(s) IntraMuscular once  methylPREDNISolone sodium succinate Injectable 20 milliGRAM(s) IV Push every 8 hours  pantoprazole    Tablet 40 milliGRAM(s) Oral before breakfast  sodium chloride 0.9%. 1000 milliLiter(s) IV Continuous <Continuous>      Vital Signs Last 24 Hrs  T(C): 36.7 (04 Oct 2024 14:44), Max: 36.9 (03 Oct 2024 21:00)  T(F): 98 (04 Oct 2024 14:44), Max: 98.4 (03 Oct 2024 21:00)  HR: 98 (04 Oct 2024 14:44) (98 - 106)  BP: 120/86 (04 Oct 2024 14:44) (120/86 - 128/84)  BP(mean): --  RR: 17 (04 Oct 2024 14:44) (17 - 18)  SpO2: 98% (04 Oct 2024 14:44) (98% - 100%)    Parameters below as of 04 Oct 2024 14:44  Patient On (Oxygen Delivery Method): room air        PHYSICAL EXAM:  General: [ ] non-toxic  HEAD/EYES: [ ] PERRL [ ] white sclera [ ] icterus  ENT:  [ ] normal [ ] supple [ ] thrush [ ] pharyngeal exudate  Cardiovascular:   [ ] murmur [ ] normal [ ] PPM/AICD  Respiratory:  [ ] clear to ausculation bilaterally  GI:  [ ] soft, non-tender, normal bowel sounds  :  [ ] torres [ ] no CVA tenderness   Musculoskeletal:  [ ] no synovitis  Neurologic:  [ ] non-focal exam   Skin:  [ ] no rash  Lymph: [ ] no lymphadenopathy  Psychiatric:  [ ] appropriate affect [ ] alert & oriented  Lines:  [ ] no phlebitis [ ] central line                                14.1   21.11 )-----------( 463      ( 04 Oct 2024 06:53 )             41.9       10-04    133[L]  |  99  |  10  ----------------------------<  113[H]  4.1   |  23  |  0.79    Ca    8.4      04 Oct 2024 06:53  Phos  3.4     10-04  Mg     2.30     10-04    TPro  6.3  /  Alb  3.1[L]  /  TBili  0.3  /  DBili  x   /  AST  16  /  ALT  37  /  AlkPhos  63  10-03      Urinalysis Basic - ( 04 Oct 2024 06:53 )    Color: x / Appearance: x / SG: x / pH: x  Gluc: 113 mg/dL / Ketone: x  / Bili: x / Urobili: x   Blood: x / Protein: x / Nitrite: x   Leuk Esterase: x / RBC: x / WBC x   Sq Epi: x / Non Sq Epi: x / Bacteria: x        MICROBIOLOGY:    RADIOLOGY:     Follow Up:      Inverval History/ROS:Patient is a 34y old  Male who presents with a chief complaint of UC flare w/ bloody diarrhea (04 Oct 2024 14:53)    + bloody diarrhea- 8 today; 15 yesterday  No fever    Allergies    No Known Allergies    Intolerances    mesalamine (Other (Mod to Severe))      ANTIMICROBIALS:      OTHER MEDS:  enoxaparin Injectable 40 milliGRAM(s) SubCutaneous every 24 hours  influenza   Vaccine 0.5 milliLiter(s) IntraMuscular once  methylPREDNISolone sodium succinate Injectable 20 milliGRAM(s) IV Push every 8 hours  pantoprazole    Tablet 40 milliGRAM(s) Oral before breakfast  sodium chloride 0.9%. 1000 milliLiter(s) IV Continuous <Continuous>      Vital Signs Last 24 Hrs  T(C): 36.7 (04 Oct 2024 14:44), Max: 36.9 (03 Oct 2024 21:00)  T(F): 98 (04 Oct 2024 14:44), Max: 98.4 (03 Oct 2024 21:00)  HR: 98 (04 Oct 2024 14:44) (98 - 106)  BP: 120/86 (04 Oct 2024 14:44) (120/86 - 128/84)  BP(mean): --  RR: 17 (04 Oct 2024 14:44) (17 - 18)  SpO2: 98% (04 Oct 2024 14:44) (98% - 100%)    Parameters below as of 04 Oct 2024 14:44  Patient On (Oxygen Delivery Method): room air        PHYSICAL EXAM:  General: [ x] non-toxic  HEAD/EYES: [ ] PERRL [ x] white sclera [ ] icterus  ENT:  [ ] normal [x ] supple [ ] thrush [ ] pharyngeal exudate  Cardiovascular:   [ ] murmur [x ] normal [ ] PPM/AICD  Respiratory:  [x ] clear to ausculation bilaterally  GI:  [x ] soft, non-tender, normal bowel sounds  :  [ ] torres [ ] no CVA tenderness   Musculoskeletal:  [ ] no synovitis  Neurologic:  [ ] non-focal exam   Skin:  [ x] no rash  Lymph: [ x] no lymphadenopathy  Psychiatric:  [ ] appropriate affect [ ] alert & oriented  Lines:  [x ] no phlebitis [ ] central line                                14.1   21.11 )-----------( 463      ( 04 Oct 2024 06:53 )             41.9       10-04    133[L]  |  99  |  10  ----------------------------<  113[H]  4.1   |  23  |  0.79    Ca    8.4      04 Oct 2024 06:53  Phos  3.4     10-04  Mg     2.30     10-04    TPro  6.3  /  Alb  3.1[L]  /  TBili  0.3  /  DBili  x   /  AST  16  /  ALT  37  /  AlkPhos  63  10-03      Urinalysis Basic - ( 04 Oct 2024 06:53 )    Color: x / Appearance: x / SG: x / pH: x  Gluc: 113 mg/dL / Ketone: x  / Bili: x / Urobili: x   Blood: x / Protein: x / Nitrite: x   Leuk Esterase: x / RBC: x / WBC x   Sq Epi: x / Non Sq Epi: x / Bacteria: x        MICROBIOLOGY:    RADIOLOGY:

## 2024-10-04 NOTE — PROGRESS NOTE ADULT - SUBJECTIVE AND OBJECTIVE BOX
[facility] Division of Hospital Medicine  Brandin CrenshawDO  Available via MS Teams  In house pager [phone]    SUBJECTIVE / OVERNIGHT EVENTS:  No acute events overnight. Patient seen and examined at bedside this morning.  Feels well today - reports some improvement in the frequency of his BMs overnight.  He understands the plan as explained by the GI team and colorectal surgery - he is hoping to avoid surgery and thinking if he must have Rinvoq, then it would be better to expedite that process.    ADDITIONAL REVIEW OF SYSTEMS:    MEDICATIONS  (STANDING):  enoxaparin Injectable 40 milliGRAM(s) SubCutaneous every 24 hours  influenza   Vaccine 0.5 milliLiter(s) IntraMuscular once  methylPREDNISolone sodium succinate Injectable 20 milliGRAM(s) IV Push every 8 hours  pantoprazole    Tablet 40 milliGRAM(s) Oral before breakfast  sodium chloride 0.9%. 1000 milliLiter(s) (100 mL/Hr) IV Continuous <Continuous>    MEDICATIONS  (PRN):      I&O's Summary      PHYSICAL EXAM:  Vital Signs Last 24 Hrs  T(C): 36.8 (04 Oct 2024 04:55), Max: 36.9 (03 Oct 2024 21:00)  T(F): 98.2 (04 Oct 2024 04:55), Max: 98.4 (03 Oct 2024 21:00)  HR: 104 (04 Oct 2024 04:55) (90 - 106)  BP: 123/84 (04 Oct 2024 04:55) (103/56 - 128/84)  BP(mean): --  RR: 18 (04 Oct 2024 04:55) (12 - 20)  SpO2: 100% (04 Oct 2024 04:55) (96% - 100%)    Parameters below as of 04 Oct 2024 04:55  Patient On (Oxygen Delivery Method): room air        LABS:                        14.1   21.11 )-----------( 463      ( 04 Oct 2024 06:53 )             41.9     10-04    133[L]  |  99  |  10  ----------------------------<  113[H]  4.1   |  23  |  0.79    Ca    8.4      04 Oct 2024 06:53  Phos  3.4     10-04  Mg     2.30     10-04    TPro  6.3  /  Alb  3.1[L]  /  TBili  0.3  /  DBili  x   /  AST  16  /  ALT  37  /  AlkPhos  63  10-03          Urinalysis Basic - ( 04 Oct 2024 06:53 )    Color: x / Appearance: x / SG: x / pH: x  Gluc: 113 mg/dL / Ketone: x  / Bili: x / Urobili: x   Blood: x / Protein: x / Nitrite: x   Leuk Esterase: x / RBC: x / WBC x   Sq Epi: x / Non Sq Epi: x / Bacteria: x

## 2024-10-04 NOTE — PROGRESS NOTE ADULT - SUBJECTIVE AND OBJECTIVE BOX
ANESTHESIA POSTOP CHECK    34y Male POSTOP DAY 1 S/P     Vital Signs Last 24 Hrs  T(C): 36.7 (04 Oct 2024 14:44), Max: 36.9 (03 Oct 2024 21:00)  T(F): 98 (04 Oct 2024 14:44), Max: 98.4 (03 Oct 2024 21:00)  HR: 98 (04 Oct 2024 14:44) (98 - 106)  BP: 120/86 (04 Oct 2024 14:44) (120/86 - 128/84)  BP(mean): --  RR: 17 (04 Oct 2024 14:44) (17 - 18)  SpO2: 98% (04 Oct 2024 14:44) (98% - 100%)    Parameters below as of 04 Oct 2024 14:44  Patient On (Oxygen Delivery Method): room air      I&O's Summary      [X ] NO APPARENT ANESTHESIA COMPLICATIONS      Comments:

## 2024-10-04 NOTE — PROGRESS NOTE ADULT - ASSESSMENT
34 year old with ulcerative colitis   He is followed at [place]    Recent C diff test was positive    Presents with continued diarrhea with blood in his stool    Imaging with colitis    Overall, my suspicion is higher that his acute worsening of symptoms are related to a flair of his UC.  Decision re immunosuppression per GI.     It can be difficult to differentiate colonization with C diff and superimposed C diff in patients with IBD    Symptoms have not improved significantly with fidaxomicin   I think that is because the primary issue is his IBD flair  I don't think a longer course of fidaxomicin will be of added benefit.      If there is ongoing concern about c diff in a patient with a complex GI history, can give a slow taper of po vancomycin over the next few weeks.  vancomycin 125 mg po q 6 for one week, q 12 for one week, daily for one week, Every other day for one week.     But overall, I think his symptoms are due to a flair of IBD.     Supportive care  GI evaluation in progress.   Pt is planning to transfer to [place]    Will sign off  Call with questions    Twan Montez MD  Please contact via TEAM between 8 am and 6 pm    In the evening or on weekends, can contact call service at [phone]

## 2024-10-04 NOTE — PROGRESS NOTE ADULT - NEUROLOGICAL
normal/cranial nerves II-XII intact/sensation intact

## 2024-10-04 NOTE — PROGRESS NOTE ADULT - ASSESSMENT
33 y/o M with pmhx of UC comes to the ED with UC flare and C Diff. On admission, labs significant for elevated WBC and CT A/P showing UC flare. Patient follows with GI (Abran) at [facility], came here due to living close to hospital. Patient has been on Entyvio but was recently switched to [facility] outpatient. Inpatient, he was treated for C. Diff and started on steroids. He has had persistent bloody diarrhea through steroids so Remicade was started 10/3. Patient also received flex sig 10/3. Surgery consulted to establish care.    Plan:  - Continue pain management  - Continue fidxamicin  - Will continue to follow    A-Team  [id number]

## 2024-10-04 NOTE — PROGRESS NOTE ADULT - ASSESSMENT
34M PMH ulcerative colitis following with Dr. Osullivan of [facility] presents with 8-10 episodes of bloody diarrhea during the day with further episodes of bloody diarrhea at night, advised to come in to hospital for treatment of UC flare.

## 2024-10-04 NOTE — PROGRESS NOTE ADULT - PSYCHIATRIC
normal/normal affect/alert and oriented x3/normal behavior

## 2024-10-04 NOTE — PROGRESS NOTE ADULT - SUBJECTIVE AND OBJECTIVE BOX
GI progress note:     Interval Hx:   - s/p colonoscopy yesterday, severe UC pancolitis w/ Leahy score 3. S/p biopsies for disease activity and CMV.   - this AM, reports less abdominal cramping, less blood, and less BM (5 BMs in the last 12 hrs). [place] 6   - CRP mildly uptrending, but WBC downtrending     Vital Signs Last 12 Hrs  T(F): 98.2 (10-04-24 @ 04:55), Max: 98.2 (10-04-24 @ 04:55)  HR: 104 (10-04-24 @ 04:55) (104 - 104)  BP: 123/84 (10-04-24 @ 04:55) (123/84 - 123/84)  BP(mean): --  RR: 18 (10-04-24 @ 04:55) (18 - 18)  SpO2: 100% (10-04-24 @ 04:55) (100% - 100%)  I&O's Summary      General: NAD  HEENT: NC/AT  Neck: supple  Respiratory: Regular RR, no increase in WOB  Cardiovascular: RRR  Abdomen: soft, NT/ND; +BS x4  Extremities: WWP, 2+ peripheral pulses b/l  Skin: normal color and turgor; no rash  Neurological: A&OX3    LABS:                        14.1   21.11 )-----------( 463      ( 04 Oct 2024 06:53 )             41.9     10-04    133[L]  |  99  |  10  ----------------------------<  113[H]  4.1   |  23  |  0.79    Ca    8.4      04 Oct 2024 06:53  Phos  3.4     10-04  Mg     2.30     10-04    TPro  6.3  /  Alb  3.1[L]  /  TBili  0.3  /  DBili  x   /  AST  16  /  ALT  37  /  AlkPhos  63  10-03      Urinalysis Basic - ( 04 Oct 2024 06:53 )    Color: x / Appearance: x / SG: x / pH: x  Gluc: 113 mg/dL / Ketone: x  / Bili: x / Urobili: x   Blood: x / Protein: x / Nitrite: x   Leuk Esterase: x / RBC: x / WBC x   Sq Epi: x / Non Sq Epi: x / Bacteria: x        RADIOLOGY & ADDITIONAL TESTS: Reviewed.    MEDICATIONS  (STANDING):  enoxaparin Injectable 40 milliGRAM(s) SubCutaneous every 24 hours  influenza   Vaccine 0.5 milliLiter(s) IntraMuscular once  methylPREDNISolone sodium succinate Injectable 20 milliGRAM(s) IV Push every 8 hours  pantoprazole    Tablet 40 milliGRAM(s) Oral before breakfast  sodium chloride 0.9%. 1000 milliLiter(s) (100 mL/Hr) IV Continuous <Continuous>    MEDICATIONS  (PRN):      Allergies    No Known Allergies    Intolerances    mesalamine (Other (Mod to Severe))

## 2024-10-04 NOTE — PROGRESS NOTE ADULT - TIME BILLING
Time-based billing (NON-critical care).     38 minutes spent on total encounter; more than 50% of the visit was spent counseling and / or coordinating care by the attending physician.  The necessity of the time spent during the encounter on this date of service was due to:     review of laboratory data, radiology results, consultants' recommendations, documentation in [place], discussion with patient/family
Time-based billing (NON-critical care).     51 minutes spent on total encounter; more than 50% of the visit was spent counseling and / or coordinating care by the attending physician.  The necessity of the time spent during the encounter on this date of service was due to:     review of laboratory data, radiology results, consultants' recommendations, documentation in [place], discussion with patient/family
Time-based billing (NON-critical care).     38 minutes spent on total encounter; more than 50% of the visit was spent counseling and / or coordinating care by the attending physician.  The necessity of the time spent during the encounter on this date of service was due to:     review of laboratory data, radiology results, consultants' recommendations, documentation in [place], discussion with patient
Time-based billing (NON-critical care).     51 minutes spent on total encounter; more than 50% of the visit was spent counseling and / or coordinating care by the attending physician.  The necessity of the time spent during the encounter on this date of service was due to:     review of laboratory data, radiology results, consultants' recommendations, documentation in [place], discussion with patient/and interdisciplinary staff (such as , social workers, etc). Interventions were performed as documented above.
Time-based billing (NON-critical care).     38 minutes spent on total encounter; more than 50% of the visit was spent counseling and / or coordinating care by the attending physician.  The necessity of the time spent during the encounter on this date of service was due to:     review of laboratory data, radiology results, consultants' recommendations, documentation in [place], discussion with patient/family

## 2024-10-04 NOTE — PROGRESS NOTE ADULT - ASSESSMENT
33 yo M with pmhx of UC comes to the ED after experiencing worsening diarrhea for the past one month with outpatient stool studies positive for C Diff and initiation of fidaxomicin. On admission, labs significant for elevated WBC and CT A/P showing UC flare. GI consulted for further management    Impression:  #UC flare  #C diff+  #bloody diarrhea   - patient with hx of UC and trialed on multiple biologics and steroid taper  - also found to be C diff + outpatient   - overall would favor presentation to be UC flare due to bloody diarrhea, as C Diff does not usually cause hematochezia  - HBV serologies neg for HBV  - per [facility], quant gold neg on 7/10/24  - pending tx to [facility] for possible initiation of Rinvoq  - stool Cx neg  - s/p remicade IV 500mg x 1 on 10/2  - s/p colonoscopy on 10/3/24, severe UC pancolitis w/ Leahy score 3. S/p biopsies for disease activity and CMV.  - today, patient feeling mildly improved and WBC downtrending but CRP uptrending. Will have to watch closely and consider another dose of remicade if no further improvement or worsens.      Recommendations:  - f/u fecal calprotectin  - c/w solumedrol IV 20mg q8hrs  - please give prolonged course of fidaxomicin (9/22 - ) to complete a 14 day course given patient's IBD  - monitor stool counts  - if no further improvement or worsens this weekend, will need another dose of remicade  - pending tx to [facility]; please keep the patient on the transfer list in case patient fails remicade and needs Rinvoq (not available at [facility] for inpatient)   - please trend CBC, CMP, and CRP daily  - low residue diet  - appreciate colorectal surgery consult  - c/w DVT ppx as patients with UC flare are high risk for VTE  - avoid opioids, anti-cholinergics, and anti-diarrheals as these can predispose to toxic megacolon  - avoid NSAIDs     All recommendations are preliminary until attending attestation.     Bentley Nichols, PGY-4  Gastroenterology & Hepatology Fellow  Available on TEAMS  Long range pager #: [phone]  Short range pager#: [phone]    For non-urgent consults, please send email to [email or web address] (for [facility]) or [email or web address] (for [facility])   33 yo M with pmhx of UC comes to the ED after experiencing worsening diarrhea for the past one month with outpatient stool studies positive for C Diff and initiation of fidaxomicin. On admission, labs significant for elevated WBC and CT A/P showing UC flare. GI consulted for further management    Impression:  #UC flare  #C diff+  #bloody diarrhea   - patient with hx of UC and trialed on multiple biologics and steroid taper  - also found to be C diff + outpatient   - overall would favor presentation to be UC flare due to bloody diarrhea, as C Diff does not usually cause hematochezia  - HBV serologies neg for HBV  - per [facility], quant gold neg on 7/10/24  - pending tx to [facility] for possible initiation of Rinvoq  - stool Cx neg  - s/p remicade IV 500mg x 1 on 10/2  - s/p colonoscopy on 10/3/24, severe UC pancolitis w/ Leahy score 3. S/p biopsies for disease activity and CMV.  - today, patient feeling mildly improved and WBC downtrending but CRP uptrending. Will have to watch closely and consider another dose of remicade if no further improvement or worsens.      Recommendations:  - f/u fecal calprotectin  - c/w solumedrol IV 20mg q8hrs  - please give prolonged course of fidaxomicin (9/22 - ) to complete a 14 day course given patient's IBD  - monitor stool counts  - if no further improvement or worsens this weekend, will need another dose of remicade  - pending tx to [facility]; please keep the patient on the transfer list in case patient fails remicade and needs Rinvoq (not available at [facility] for inpatient)   - f/u path   - please trend CBC, CMP, and CRP daily  - low residue diet  - appreciate colorectal surgery consult  - c/w DVT ppx as patients with UC flare are high risk for VTE  - avoid opioids, anti-cholinergics, and anti-diarrheals as these can predispose to toxic megacolon  - avoid NSAIDs     All recommendations are preliminary until attending attestation.     Bentley Nichols, PGY-4  Gastroenterology & Hepatology Fellow  Available on TEAMS  Long range pager #: [phone]  Short range pager#: [phone]    For non-urgent consults, please send email to [email or web address] (for [facility]) or [email or web address] (for RUELJ)   33 yo M with pmhx of UC comes to the ED after experiencing worsening diarrhea for the past one month with outpatient stool studies positive for C Diff and initiation of fidaxomicin. On admission, labs significant for elevated WBC and CT A/P showing UC flare. GI consulted for further management    Impression:  #UC flare  #C diff+  #bloody diarrhea   - patient with hx of UC and trialed on multiple biologics and steroid taper  - also found to be C diff + outpatient   - overall would favor presentation to be UC flare due to bloody diarrhea, as C Diff does not usually cause hematochezia  - HBV serologies neg for HBV  - per [facility], quant gold neg on 7/10/24  - pending tx to [facility] for possible initiation of Rinvoq  - stool Cx neg  - s/p remicade IV 500mg x 1 on 10/2  - s/p colonoscopy on 10/3/24, severe UC pancolitis w/ Leahy score 3. S/p biopsies for disease activity and CMV.  - today, patient feeling mildly improved and WBC downtrending but CRP uptrending. Will have to watch closely and consider another dose of remicade if no further improvement or worsens.      Recommendations:  - f/u fecal calprotectin  - c/w solumedrol IV 20mg q8hrs  - please give prolonged course of fidaxomicin (9/22 - ) to complete a 14 day course given patient's IBD  - if no further improvement or worsens this weekend, will need another dose of remicade  - f/u path   - monitor stool counts  - please trend CBC, CMP, and CRP daily  - low residue diet  - appreciate colorectal surgery consult  - c/w DVT ppx as patients with UC flare are high risk for VTE  - avoid opioids, anti-cholinergics, and anti-diarrheals as these can predispose to toxic megacolon  - avoid NSAIDs     All recommendations are preliminary until attending attestation.     Bentley Nichols, PGY-4  Gastroenterology & Hepatology Fellow  Available on TEAMS  Long range pager #: [phone]  Short range pager#: [phone]    For non-urgent consults, please send email to [email or web address] (for [facility]) or [email or web address] (for [facility])   33 yo M with pmhx of UC comes to the ED after experiencing worsening diarrhea for the past one month with outpatient stool studies positive for C Diff and initiation of fidaxomicin. On admission, labs significant for elevated WBC and CT A/P showing UC flare. GI consulted for further management    Impression:  #UC flare  #C diff+  #bloody diarrhea   - patient with hx of UC and trialed on multiple biologics and steroid taper  - also found to be C diff + outpatient   - overall would favor presentation to be UC flare due to bloody diarrhea, as C Diff does not usually cause hematochezia  - HBV serologies neg for HBV  - per [facility], quant gold neg on 7/10/24  - pending tx to [facility] for possible initiation of Rinvoq  - stool Cx neg  - s/p remicade IV 500mg x 1 on 10/2  - s/p colonoscopy on 10/3/24, severe UC pancolitis w/ Leahy score 3. S/p biopsies for disease activity and CMV.  - today, patient feeling mildly improved and WBC downtrending but CRP uptrending. Will have to watch closely and consider another dose of remicade if no further improvement or worsens.      Recommendations:  - f/u fecal calprotectin  - c/w solumedrol IV 20mg q8hrs  - spoke w/ ID re prolonged fidaxomicin course; given no evidence behind efficacy of prolonged course and patient's sx and presentation more 2/2 UC flare, agree w/ 10 day course of fidaxomicin that he completed  - if no further improvement or worsens this weekend, will need another dose of remicade  - f/u path   - monitor stool counts  - please trend CBC, CMP, and CRP daily  - low residue diet  - appreciate colorectal surgery consult  - c/w DVT ppx as patients with UC flare are high risk for VTE  - avoid opioids, anti-cholinergics, and anti-diarrheals as these can predispose to toxic megacolon  - avoid NSAIDs     All recommendations are preliminary until attending attestation.     Bentley Nichols, PGY-4  Gastroenterology & Hepatology Fellow  Available on TEAMS  Long range pager #: [phone]  Short range pager#: [phone]    For non-urgent consults, please send email to [email or web address] (for [facility]) or [email or web address] (for [facility]ENCHO)   33 yo M with pmhx of UC comes to the ED after experiencing worsening diarrhea for the past one month with outpatient stool studies positive for C Diff and initiation of fidaxomicin. On admission, labs significant for elevated WBC and CT A/P showing UC flare. GI consulted for further management    Impression:  #UC flare  #C diff+  #bloody diarrhea   - patient with hx of UC and trialed on multiple biologics and steroid taper  - also found to be C diff + outpatient   - overall would favor presentation to be UC flare due to bloody diarrhea, as C Diff does not usually cause hematochezia  - HBV serologies neg for HBV  - per [facility], quant gold neg on 7/10/24  - pending tx to [facility] for management with his primary GI  - stool Cx neg  - s/p remicade IV 500mg x 1 on 10/2  - s/p colonoscopy on 10/3/24, severe UC pancolitis w/ Leahy score 3. S/p biopsies for disease activity and CMV.  - today, patient feeling mildly improved and WBC downtrending but CRP uptrending. Will have to watch closely and consider another dose of remicade if no further improvement or worsens.      Recommendations:  - f/u fecal calprotectin  - c/w solumedrol IV 20mg q8hrs  - spoke w/ ID re prolonged fidaxomicin course; given no evidence behind efficacy of prolonged course and patient's sx and presentation more 2/2 UC flare, agree w/ 10 day course of fidaxomicin that he completed  - if no further improvement or worsens this weekend, will need another dose of remicade  - f/u path   - monitor stool counts  - please trend CBC, CMP, and CRP daily  - low residue diet  - appreciate colorectal surgery consult  - c/w DVT ppx as patients with UC flare are high risk for VTE  - avoid opioids, anti-cholinergics, and anti-diarrheals as these can predispose to toxic megacolon  - avoid NSAIDs     All recommendations are preliminary until attending attestation.     Bentley Nichols, PGY-4  Gastroenterology & Hepatology Fellow  Available on TEAMS  Long range pager #: [phone]  Short range pager#: [phone]    For non-urgent consults, please send email to [email or web address] (for [facility]) or [email or web address] (for AM[facility])

## 2024-10-04 NOTE — PROGRESS NOTE ADULT - SUBJECTIVE AND OBJECTIVE BOX
Surgery Daily Progress Note  =====================================================  SUBJECTIVE: A 34y Male Patient seen and examined at bedside on AM rounds. No abdominal pain, no fever, or chills.     ALLERGIES:  No Known Allergies  mesalamine (Other (Mod to Severe))      --------------------------------------------------------------------------------------    MEDICATIONS:    Neurologic Medications    Respiratory Medications    Cardiovascular Medications    Gastrointestinal Medications  pantoprazole    Tablet 40 milliGRAM(s) Oral before breakfast  sodium chloride 0.9%. 1000 milliLiter(s) IV Continuous <Continuous>    Genitourinary Medications    Hematologic/Oncologic Medications  enoxaparin Injectable 40 milliGRAM(s) SubCutaneous every 24 hours  influenza   Vaccine 0.5 milliLiter(s) IntraMuscular once    Antimicrobial/Immunologic Medications    Endocrine/Metabolic Medications  methylPREDNISolone sodium succinate Injectable 20 milliGRAM(s) IV Push every 8 hours    Topical/Other Medications    --------------------------------------------------------------------------------------    VITAL SIGNS:  No Known Allergies  mesalamine (Other (Mod to Severe))      T(C): 36.8 (10-04-24 @ 04:55), Max: 36.9 (10-03-24 @ 21:00)  HR: 104 (10-04-24 @ 04:55) (90 - 109)  BP: 123/84 (10-04-24 @ 04:55) (103/56 - 128/84)  RR: 18 (10-04-24 @ 04:55) (12 - 20)  SpO2: 100% (10-04-24 @ 04:55) (96% - 100%)  --------------------------------------------------------------------------------------    EXAM    General: NAD, resting in bed comfortably.  Cardiac: regular rate, warm and well perfused  Respiratory: Nonlabored respirations, normal cw expansion.  Abdomen: soft, mildly tender, nondistended.  Extremities: normal strength, FROM, no deformities    --------------------------------------------------------------------------------------    I&Os  T(C): 36.8 (10-04-24 @ 04:55), Max: 36.9 (10-03-24 @ 21:00)  HR: 104 (10-04-24 @ 04:55) (90 - 109)  BP: 123/84 (10-04-24 @ 04:55) (103/56 - 128/84)  RR: 18 (10-04-24 @ 04:55) (12 - 20)  SpO2: 100% (10-04-24 @ 04:55) (96% - 100%)  --------------------------------------------------------------------------------------    LABS                          14.1   21.11 )-----------( 463      ( 04 Oct 2024 06:53 )             41.9     10-04    133[L]  |  99  |  10  ----------------------------<  113[H]  4.1   |  23  |  0.79    Ca    8.4      04 Oct 2024 06:53  Phos  3.4     10-04  Mg     2.30     10-04    TPro  6.3  /  Alb  3.1[L]  /  TBili  0.3  /  DBili  x   /  AST  16  /  ALT  37  /  AlkPhos  63  10-03      Urinalysis Basic - ( 04 Oct 2024 06:53 )    Color: x / Appearance: x / SG: x / pH: x  Gluc: 113 mg/dL / Ketone: x  / Bili: x / Urobili: x   Blood: x / Protein: x / Nitrite: x   Leuk Esterase: x / RBC: x / WBC x   Sq Epi: x / Non Sq Epi: x / Bacteria: x        enoxaparin Injectable 40 milliGRAM(s) SubCutaneous every 24 hours  influenza   Vaccine 0.5 milliLiter(s) IntraMuscular once  methylPREDNISolone sodium succinate Injectable 20 milliGRAM(s) IV Push every 8 hours  pantoprazole    Tablet 40 milliGRAM(s) Oral before breakfast  sodium chloride 0.9%. 1000 milliLiter(s) IV Continuous <Continuous>      RADIOLOGY, EKG & ADDITIONAL TESTS: Reviewed.

## 2024-10-04 NOTE — PROGRESS NOTE ADULT - GASTROINTESTINAL
normal/soft/nontender/nondistended

## 2024-10-04 NOTE — PROGRESS NOTE ADULT - PROBLEM SELECTOR PLAN 1
- ongoing flare for 3 months notable for episodes of bloody diarrhea  - routinely follows with GI Dr Edwin Osullivan at [facility]; previously on Entyvio and recently switched to Stelara (s/p loading dose, 1 maintenance dose)  - currently receiving IV steroids (methylprednisolone 20mg q8h) - PPI ppx and glucose monitoring  - GI and ID following - s/p fidaxomicin for Cdiff for 10d course (per ID, no utility in extending course, may choose po vancomycin if GI insist)  - s/p flex-sig 10/3 - biopsy taken, severe UC noted  - trend H/H  - plan was to transfer to [facility] so patient can follow with his primary GI team and potentially initiate JAK2 inhibitors, i.e. Rinvoq; now seemingly cancelled  - s/p infliximab x1 - GI may offer a 2nd dose based on clinical picture  - alternative options may be to prescribe Rinvoq to outpatient pharmacy and obtain prior auth

## 2024-10-04 NOTE — PROGRESS NOTE ADULT - MUSCULOSKELETAL
strength 5/5 bilateral upper extremities/strength 5/5 bilateral lower extremities
strength 5/5 bilateral upper extremities/strength 5/5 bilateral lower extremities
ROM intact/normal gait/strength 5/5 bilateral upper extremities/strength 5/5 bilateral lower extremities
normal gait/strength 5/5 bilateral upper extremities/strength 5/5 bilateral lower extremities
normal/ROM intact/normal gait/strength 5/5 bilateral upper extremities/strength 5/5 bilateral lower extremities

## 2024-10-04 NOTE — PROGRESS NOTE ADULT - CARDIOVASCULAR
normal/regular rate and rhythm/S1 S2 present/no gallops/no rub/no murmur

## 2024-10-04 NOTE — PROGRESS NOTE ADULT - EYES
conjunctiva clear/non icteric sclera
EOMI/conjunctiva clear/non icteric sclera
conjunctiva clear/non icteric sclera
PERRL/EOMI/conjunctiva clear/normal
conjunctiva clear/non icteric sclera

## 2024-10-04 NOTE — PROGRESS NOTE ADULT - PROBLEM SELECTOR PLAN 2
- positive Cdiff as outpatient, was initiated on fidaxomicin for 10d course - completed  - ID recs appreciated   - monitor electrolytes  - contact precautions - unable to remove per infection control due to ongoing symptoms (though may be attributable to UC flare rather than Cdiff)  - per ID, no utility in extending fidaxomicin course, may choose po vancomycin if GI insist

## 2024-10-05 LAB
ANION GAP SERPL CALC-SCNC: 12 MMOL/L — SIGNIFICANT CHANGE UP (ref 7–14)
BUN SERPL-MCNC: 8 MG/DL — SIGNIFICANT CHANGE UP (ref 7–23)
CALCIUM SERPL-MCNC: 8.5 MG/DL — SIGNIFICANT CHANGE UP (ref 8.4–10.5)
CHLORIDE SERPL-SCNC: 99 MMOL/L — SIGNIFICANT CHANGE UP (ref 98–107)
CO2 SERPL-SCNC: 23 MMOL/L — SIGNIFICANT CHANGE UP (ref 22–31)
CREAT SERPL-MCNC: 0.79 MG/DL — SIGNIFICANT CHANGE UP (ref 0.5–1.3)
CRP SERPL-MCNC: 9.5 MG/L — HIGH
EGFR: 121 ML/MIN/1.73M2 — SIGNIFICANT CHANGE UP
GLUCOSE SERPL-MCNC: 109 MG/DL — HIGH (ref 70–99)
HCT VFR BLD CALC: 41.4 % — SIGNIFICANT CHANGE UP (ref 39–50)
HGB BLD-MCNC: 13.9 G/DL — SIGNIFICANT CHANGE UP (ref 13–17)
MAGNESIUM SERPL-MCNC: 2.2 MG/DL — SIGNIFICANT CHANGE UP (ref 1.6–2.6)
MCHC RBC-ENTMCNC: 27.5 PG — SIGNIFICANT CHANGE UP (ref 27–34)
MCHC RBC-ENTMCNC: 33.6 GM/DL — SIGNIFICANT CHANGE UP (ref 32–36)
MCV RBC AUTO: 81.8 FL — SIGNIFICANT CHANGE UP (ref 80–100)
NRBC # BLD: 0 /100 WBCS — SIGNIFICANT CHANGE UP (ref 0–0)
NRBC # FLD: 0 K/UL — SIGNIFICANT CHANGE UP (ref 0–0)
PHOSPHATE SERPL-MCNC: 3.7 MG/DL — SIGNIFICANT CHANGE UP (ref 2.5–4.5)
PLATELET # BLD AUTO: 476 K/UL — HIGH (ref 150–400)
POTASSIUM SERPL-MCNC: 3.8 MMOL/L — SIGNIFICANT CHANGE UP (ref 3.5–5.3)
POTASSIUM SERPL-SCNC: 3.8 MMOL/L — SIGNIFICANT CHANGE UP (ref 3.5–5.3)
RBC # BLD: 5.06 M/UL — SIGNIFICANT CHANGE UP (ref 4.2–5.8)
RBC # FLD: 13 % — SIGNIFICANT CHANGE UP (ref 10.3–14.5)
SODIUM SERPL-SCNC: 134 MMOL/L — LOW (ref 135–145)
WBC # BLD: 22.21 K/UL — HIGH (ref 3.8–10.5)
WBC # FLD AUTO: 22.21 K/UL — HIGH (ref 3.8–10.5)

## 2024-10-05 PROCEDURE — 99232 SBSQ HOSP IP/OBS MODERATE 35: CPT

## 2024-10-05 PROCEDURE — 99232 SBSQ HOSP IP/OBS MODERATE 35: CPT | Mod: GC

## 2024-10-05 RX ORDER — INFLIXIMAB-DYYB 120 MG/ML
500 INJECTION SUBCUTANEOUS ONCE
Refills: 0 | Status: DISCONTINUED | OUTPATIENT
Start: 2024-10-05 | End: 2024-10-05

## 2024-10-05 RX ORDER — INFLIXIMAB-DYYB 120 MG/ML
500 INJECTION SUBCUTANEOUS ONCE
Refills: 0 | Status: COMPLETED | OUTPATIENT
Start: 2024-10-05 | End: 2024-10-05

## 2024-10-05 RX ADMIN — METHYLPREDNISOLONE ACETATE 20 MILLIGRAM(S): 80 INJECTION, SUSPENSION INTRA-ARTICULAR; INTRALESIONAL; INTRAMUSCULAR; SOFT TISSUE at 01:51

## 2024-10-05 RX ADMIN — METHYLPREDNISOLONE ACETATE 20 MILLIGRAM(S): 80 INJECTION, SUSPENSION INTRA-ARTICULAR; INTRALESIONAL; INTRAMUSCULAR; SOFT TISSUE at 11:22

## 2024-10-05 RX ADMIN — METHYLPREDNISOLONE ACETATE 20 MILLIGRAM(S): 80 INJECTION, SUSPENSION INTRA-ARTICULAR; INTRALESIONAL; INTRAMUSCULAR; SOFT TISSUE at 18:11

## 2024-10-05 RX ADMIN — INFLIXIMAB-DYYB 125 MILLIGRAM(S): 120 INJECTION SUBCUTANEOUS at 18:11

## 2024-10-05 RX ADMIN — PANTOPRAZOLE SODIUM 40 MILLIGRAM(S): 40 TABLET, DELAYED RELEASE ORAL at 06:19

## 2024-10-05 NOTE — PROGRESS NOTE ADULT - SUBJECTIVE AND OBJECTIVE BOX
[facility] Division of Hospital Medicine  Maria L Perea MD  Pager [phone]    Patient is a 34y old  Male who presents with a chief complaint of UC flare w/ bloody diarrhea    SUBJECTIVE / OVERNIGHT EVENTS: pt seen earlier this AM; reports dec freq of BMs and less bloody; no abd pain; eating well      MEDICATIONS  (STANDING):  enoxaparin Injectable 40 milliGRAM(s) SubCutaneous every 24 hours  influenza   Vaccine 0.5 milliLiter(s) IntraMuscular once  methylPREDNISolone sodium succinate Injectable 20 milliGRAM(s) IV Push every 8 hours  pantoprazole    Tablet 40 milliGRAM(s) Oral before breakfast  sodium chloride 0.9%. 1000 milliLiter(s) (100 mL/Hr) IV Continuous <Continuous>    PHYSICAL EXAM:  Vital Signs Last 24 Hrs  T(F): 98.3 (05 Oct 2024 10:15), Max: 98.3 (05 Oct 2024 10:15)  HR: 108 (05 Oct 2024 10:15) (89 - 108)  BP: 128/84 (05 Oct 2024 10:15) (120/65 - 137/89)  RR: 18 (05 Oct 2024 10:15) (17 - 18)  SpO2: 99% (05 Oct 2024 10:15) (98% - 100%)    Parameters below as of 05 Oct 2024 05:00  Patient On (Oxygen Delivery Method): room air        CONSTITUTIONAL: NAD, appears comfortable  EYES: PERRLA; conjunctiva and sclera clear  ENMT: Moist oral mucosa; normal dentition  RESPIRATORY: Normal respiratory effort; lungs are clear to auscultation bilaterally  CARDIOVASCULAR: Regular rate and rhythm; No lower extremity edema  ABDOMEN: Nontender to palpation, normoactive bowel sounds  MUSCULOSKELETAL:  no clubbing or cyanosis of digits; no joint swelling or tenderness to palpation  PSYCH: A+O to person, place, and time; affect appropriate  NEUROLOGY: CN 2-12 are intact and symmetric; no gross sensory deficits   SKIN: No rashes; no palpable lesions    LABS:                        13.9   22.21 )-----------( 476      ( 05 Oct 2024 06:22 )             41.4     10-05    134[L]  |  99  |  8   ----------------------------<  109[H]  3.8   |  23  |  0.79    Ca    8.5      05 Oct 2024 06:22  Phos  3.7     10-05  Mg     2.20     10-05

## 2024-10-05 NOTE — PROGRESS NOTE ADULT - PROBLEM SELECTOR PLAN 1
- ongoing flare for 3 months notable for episodes of bloody diarrhea  - routinely follows with GI Dr Edwin Osullivan at [facility]; previously on Entyvio and recently switched to Stelara (s/p loading dose, 1 maintenance dose)  - currently receiving IV steroids (methylprednisolone 20mg q8h) - PPI ppx and glucose monitoring  - GI and ID following - s/p fidaxomicin for Cdiff for 10d course (per ID, no utility in extending course, may choose po vancomycin if GI insist)  - s/p flex-sig 10/3 - biopsy taken, severe UC noted  - trend H/H  - plan was to transfer to [facility] so patient can follow with his primary GI team and potentially initiate JAK2 inhibitors, i.e. Rinvoq; now cancelled  - s/p infliximab x1 - GI may offer a 2nd dose today, await f/u

## 2024-10-05 NOTE — PROGRESS NOTE ADULT - ASSESSMENT
33 yo M with pmhx of UC comes to the ED after experiencing worsening diarrhea for the past one month with outpatient stool studies positive for C Diff and initiation of fidaxomicin. On admission, labs significant for elevated WBC and CT A/P showing UC flare. GI consulted for further management    Impression:  #UC flare  #C diff+  #bloody diarrhea   - patient with hx of UC and trialed on multiple biologics and steroid taper  - also found to be C diff + outpatient   - overall would favor presentation to be UC flare due to bloody diarrhea, as C Diff does not usually cause hematochezia  - HBV serologies neg for HBV  - per [facility], quant gold neg on 7/10/24  - pending tx to [facility] for management with his primary GI  - stool Cx neg  - s/p remicade IV 500mg x 1 on 10/2  - s/p colonoscopy on 10/3/24, severe UC pancolitis w/ Leahy score 3. S/p biopsies for disease activity and CMV.  -today, feeling a bit better, CRP downtrending     Recommendations:  - f/u fecal calprotectin  - c/w solumedrol IV 20mg q8hrs  - spoke w/ ID re prolonged fidaxomicin course; given no evidence behind efficacy of prolonged course and patient's sx and presentation more 2/2 UC flare, agree w/ 10 day course of fidaxomicin that he completed  - monitor stool counts  - please trend CBC, CMP, and CRP daily  -will consider another dose of Remicade today     Note incomplete until finalized by attending signature/attestation.    Mary Martinez  GI/Hepatology Fellow    MONDAY-FRIDAY 8AM-5PM:  Pager# [phone] ([facility]) or [phone] ([facility])    NON-URGENT CONSULTS:  Please email [email or web address] OR [email or web address]  AT NIGHT AND ON WEEKENDS:  Contact on-call GI fellow from 5pm-8am and on weekends/holidays    - low residue diet  - appreciate colorectal surgery consult  - c/w DVT ppx as patients with UC flare are high risk for VTE  - avoid opioids, anti-cholinergics, and anti-diarrheals as these can predispose to toxic megacolon  - avoid NSAIDs      33 yo M with pmhx of UC comes to the ED after experiencing worsening diarrhea for the past one month with outpatient stool studies positive for C Diff and initiation of fidaxomicin. On admission, labs significant for elevated WBC and CT A/P showing UC flare. GI consulted for further management    Impression:  #UC flare  #C diff+  #bloody diarrhea   - patient with hx of UC and trialed on multiple biologics and steroid taper  - also found to be C diff + outpatient   - overall would favor presentation to be UC flare due to bloody diarrhea, as C Diff does not usually cause hematochezia  - HBV serologies neg for HBV  - per [facility], quant gold neg on 7/10/24  - pending tx to [facility] for management with his primary GI  - stool Cx neg  - s/p remicade IV 500mg x 1 on 10/2  - s/p colonoscopy on 10/3/24, severe UC pancolitis w/ Leahy score 3. S/p biopsies for disease activity and CMV.  -today, feeling a bit better, CRP downtrending     Recommendations:  - f/u fecal calprotectin  - c/w solumedrol IV 20mg q8hrs  - spoke w/ ID re prolonged fidaxomicin course; given no evidence behind efficacy of prolonged course and patient's sx and presentation more 2/2 UC flare, agree w/ 10 day course of fidaxomicin that he completed  - monitor stool counts  - please trend CBC, CMP, and CRP daily  -will give another dose of infliximab today at 5mg/kg (500mg) per shared decision making discussion     Note incomplete until finalized by attending signature/attestation.    Mary Martinez  GI/Hepatology Fellow    MONDAY-FRIDAY 8AM-5PM:  Pager# [phone] ([facility]) or [phone] ([facility])    NON-URGENT CONSULTS:  Please email [email or web address]  AT NIGHT AND ON WEEKENDS:  Contact on-call GI fellow from 5pm-8am and on weekends/holidays    - low residue diet  - appreciate colorectal surgery consult  - c/w DVT ppx as patients with UC flare are high risk for VTE  - avoid opioids, anti-cholinergics, and anti-diarrheals as these can predispose to toxic megacolon  - avoid NSAIDs

## 2024-10-05 NOTE — PROGRESS NOTE ADULT - SUBJECTIVE AND OBJECTIVE BOX
Chief Complaint:  Patient is a 34y old  Male who presents with a chief complaint of UC flare w/ bloody diarrhea (04 Oct 2024 15:02)      Interval Events:   -feeling a bit better today, 5 BM since last night, less bloody  -CRP downtrending (12.8-->9.5)    Allergies:  No Known Allergies  mesalamine (Other (Mod to Severe))      Hospital Medications:  enoxaparin Injectable 40 milliGRAM(s) SubCutaneous every 24 hours  inFLIXimab-dyyb (INFLECTRA) IVPB 500 milliGRAM(s) IV Intermittent once  influenza   Vaccine 0.5 milliLiter(s) IntraMuscular once  methylPREDNISolone sodium succinate Injectable 20 milliGRAM(s) IV Push every 8 hours  pantoprazole    Tablet 40 milliGRAM(s) Oral before breakfast  sodium chloride 0.9%. 1000 milliLiter(s) IV Continuous <Continuous>        PHYSICAL EXAM:   Vital Signs:  Vital Signs Last 24 Hrs  T(C): 36.7 (05 Oct 2024 05:00), Max: 36.8 (04 Oct 2024 18:31)  T(F): 98 (05 Oct 2024 05:00), Max: 98.2 (04 Oct 2024 18:31)  HR: 89 (05 Oct 2024 05:00) (89 - 108)  BP: 120/65 (05 Oct 2024 05:00) (120/65 - 137/89)  BP(mean): --  RR: 18 (05 Oct 2024 05:00) (17 - 18)  SpO2: 98% (05 Oct 2024 05:00) (98% - 100%)    Parameters below as of 05 Oct 2024 05:00  Patient On (Oxygen Delivery Method): room air      Daily     Daily     GENERAL:  No acute distress  HEENT:  NCAT, no scleral icterus  CHEST: no resp distress  HEART:  RRR  ABDOMEN:  Soft, non-tender, non-distended, normoactive bowel sounds, no masses, no hepato-splenomegaly, no signs of chronic liver disease  EXTREMITIES:  No cyanosis, clubbing, or edema  SKIN:  No rash/erythema/ecchymoses/petechiae/wounds/abscess/warm/dry  NEURO:  Alert and oriented x 3, no asterixis, no tremor    LABS:                        13.9   22.21 )-----------( 476      ( 05 Oct 2024 06:22 )             41.4     Mean Cell Volume: 81.8 fL (10-05-24 @ 06:22)    10-05    134[L]  |  99  |  8   ----------------------------<  109[H]  3.8   |  23  |  0.79    Ca    8.5      05 Oct 2024 06:22  Phos  3.7     10-05  Mg     2.20     10-05          Urinalysis Basic - ( 05 Oct 2024 06:22 )    Color: x / Appearance: x / SG: x / pH: x  Gluc: 109 mg/dL / Ketone: x  / Bili: x / Urobili: x   Blood: x / Protein: x / Nitrite: x   Leuk Esterase: x / RBC: x / WBC x   Sq Epi: x / Non Sq Epi: x / Bacteria: x            Imaging:             Chief Complaint:  Patient is a 34y old  Male who presents with a chief complaint of UC flare w/ bloody diarrhea (04 Oct 2024 15:02)      Interval Events:   -feeling a bit better today, 10 BM since last night, less bloody. Feels frequency is decreasing and consistency is improving   -CRP downtrending (12.8-->9.5)    Allergies:  No Known Allergies  mesalamine (Other (Mod to Severe))      Hospital Medications:  enoxaparin Injectable 40 milliGRAM(s) SubCutaneous every 24 hours  inFLIXimab-dyyb (INFLECTRA) IVPB 500 milliGRAM(s) IV Intermittent once  influenza   Vaccine 0.5 milliLiter(s) IntraMuscular once  methylPREDNISolone sodium succinate Injectable 20 milliGRAM(s) IV Push every 8 hours  pantoprazole    Tablet 40 milliGRAM(s) Oral before breakfast  sodium chloride 0.9%. 1000 milliLiter(s) IV Continuous <Continuous>        PHYSICAL EXAM:   Vital Signs:  Vital Signs Last 24 Hrs  T(C): 36.7 (05 Oct 2024 05:00), Max: 36.8 (04 Oct 2024 18:31)  T(F): 98 (05 Oct 2024 05:00), Max: 98.2 (04 Oct 2024 18:31)  HR: 89 (05 Oct 2024 05:00) (89 - 108)  BP: 120/65 (05 Oct 2024 05:00) (120/65 - 137/89)  BP(mean): --  RR: 18 (05 Oct 2024 05:00) (17 - 18)  SpO2: 98% (05 Oct 2024 05:00) (98% - 100%)    Parameters below as of 05 Oct 2024 05:00  Patient On (Oxygen Delivery Method): room air      Daily     Daily     GENERAL:  No acute distress  HEENT:  NCAT, no scleral icterus  CHEST: no resp distress  HEART:  RRR  ABDOMEN:  Soft, non-tender, non-distended, normoactive bowel sounds, no masses, no hepato-splenomegaly, no signs of chronic liver disease  EXTREMITIES:  No cyanosis, clubbing, or edema  SKIN:  No rash/erythema/ecchymoses/petechiae/wounds/abscess/warm/dry  NEURO:  Alert and oriented x 3, no asterixis, no tremor    LABS:                        13.9   22.21 )-----------( 476      ( 05 Oct 2024 06:22 )             41.4     Mean Cell Volume: 81.8 fL (10-05-24 @ 06:22)    10-05    134[L]  |  99  |  8   ----------------------------<  109[H]  3.8   |  23  |  0.79    Ca    8.5      05 Oct 2024 06:22  Phos  3.7     10-05  Mg     2.20     10-05          Urinalysis Basic - ( 05 Oct 2024 06:22 )    Color: x / Appearance: x / SG: x / pH: x  Gluc: 109 mg/dL / Ketone: x  / Bili: x / Urobili: x   Blood: x / Protein: x / Nitrite: x   Leuk Esterase: x / RBC: x / WBC x   Sq Epi: x / Non Sq Epi: x / Bacteria: x            Imaging:

## 2024-10-05 NOTE — PROGRESS NOTE ADULT - ASSESSMENT
34M PMH ulcerative colitis following with Dr. Osullivan of [facility] presents with 8-10 episodes of bloody diarrhea during the day with further episodes of bloody diarrhea at night, advised to come in to hospital for treatment of UC flare.

## 2024-10-06 LAB
ANION GAP SERPL CALC-SCNC: 10 MMOL/L — SIGNIFICANT CHANGE UP (ref 7–14)
BUN SERPL-MCNC: 8 MG/DL — SIGNIFICANT CHANGE UP (ref 7–23)
CALCIUM SERPL-MCNC: 8.3 MG/DL — LOW (ref 8.4–10.5)
CALPROTECTIN STL-MCNT: 3740 UG/G — HIGH (ref 0–120)
CHLORIDE SERPL-SCNC: 99 MMOL/L — SIGNIFICANT CHANGE UP (ref 98–107)
CO2 SERPL-SCNC: 26 MMOL/L — SIGNIFICANT CHANGE UP (ref 22–31)
CREAT SERPL-MCNC: 0.75 MG/DL — SIGNIFICANT CHANGE UP (ref 0.5–1.3)
CRP SERPL-MCNC: 7.8 MG/L — HIGH
EGFR: 123 ML/MIN/1.73M2 — SIGNIFICANT CHANGE UP
GLUCOSE SERPL-MCNC: 110 MG/DL — HIGH (ref 70–99)
HCT VFR BLD CALC: 40.8 % — SIGNIFICANT CHANGE UP (ref 39–50)
HGB BLD-MCNC: 13.7 G/DL — SIGNIFICANT CHANGE UP (ref 13–17)
MAGNESIUM SERPL-MCNC: 2.1 MG/DL — SIGNIFICANT CHANGE UP (ref 1.6–2.6)
MCHC RBC-ENTMCNC: 27.7 PG — SIGNIFICANT CHANGE UP (ref 27–34)
MCHC RBC-ENTMCNC: 33.6 GM/DL — SIGNIFICANT CHANGE UP (ref 32–36)
MCV RBC AUTO: 82.4 FL — SIGNIFICANT CHANGE UP (ref 80–100)
NRBC # BLD: 0 /100 WBCS — SIGNIFICANT CHANGE UP (ref 0–0)
NRBC # FLD: 0 K/UL — SIGNIFICANT CHANGE UP (ref 0–0)
PHOSPHATE SERPL-MCNC: 4.1 MG/DL — SIGNIFICANT CHANGE UP (ref 2.5–4.5)
PLATELET # BLD AUTO: 467 K/UL — HIGH (ref 150–400)
POTASSIUM SERPL-MCNC: 3.9 MMOL/L — SIGNIFICANT CHANGE UP (ref 3.5–5.3)
POTASSIUM SERPL-SCNC: 3.9 MMOL/L — SIGNIFICANT CHANGE UP (ref 3.5–5.3)
RBC # BLD: 4.95 M/UL — SIGNIFICANT CHANGE UP (ref 4.2–5.8)
RBC # FLD: 12.8 % — SIGNIFICANT CHANGE UP (ref 10.3–14.5)
SODIUM SERPL-SCNC: 135 MMOL/L — SIGNIFICANT CHANGE UP (ref 135–145)
WBC # BLD: 20.37 K/UL — HIGH (ref 3.8–10.5)
WBC # FLD AUTO: 20.37 K/UL — HIGH (ref 3.8–10.5)

## 2024-10-06 PROCEDURE — 99232 SBSQ HOSP IP/OBS MODERATE 35: CPT

## 2024-10-06 RX ORDER — 5-HYDROXYTRYPTOPHAN (5-HTP) 100 MG
3 TABLET,DISINTEGRATING ORAL AT BEDTIME
Refills: 0 | Status: DISCONTINUED | OUTPATIENT
Start: 2024-10-06 | End: 2024-10-10

## 2024-10-06 RX ADMIN — METHYLPREDNISOLONE ACETATE 20 MILLIGRAM(S): 80 INJECTION, SUSPENSION INTRA-ARTICULAR; INTRALESIONAL; INTRAMUSCULAR; SOFT TISSUE at 01:04

## 2024-10-06 RX ADMIN — PANTOPRAZOLE SODIUM 40 MILLIGRAM(S): 40 TABLET, DELAYED RELEASE ORAL at 05:45

## 2024-10-06 RX ADMIN — Medication 3 MILLIGRAM(S): at 21:26

## 2024-10-06 RX ADMIN — METHYLPREDNISOLONE ACETATE 20 MILLIGRAM(S): 80 INJECTION, SUSPENSION INTRA-ARTICULAR; INTRALESIONAL; INTRAMUSCULAR; SOFT TISSUE at 10:46

## 2024-10-06 RX ADMIN — METHYLPREDNISOLONE ACETATE 20 MILLIGRAM(S): 80 INJECTION, SUSPENSION INTRA-ARTICULAR; INTRALESIONAL; INTRAMUSCULAR; SOFT TISSUE at 18:15

## 2024-10-06 NOTE — PROGRESS NOTE ADULT - PROBLEM SELECTOR PLAN 1
- ongoing flare for 3 months notable for episodes of bloody diarrhea  - routinely follows with GI Dr Edwin Osullivan at [facility]; previously on Entyvio and recently switched to Stelara (s/p loading dose, 1 maintenance dose)  - currently receiving IV steroids (methylprednisolone 20mg q8h) - PPI ppx and glucose monitoring  - s/p flex-sig 10/3 - biopsy taken, severe UC noted  - plan was to transfer to [facility] so patient can follow with his primary GI team and potentially initiate JAK2 inhibitors, i.e. Rinvoq; now cancelled  - s/p infliximab x2  sx improving

## 2024-10-06 NOTE — PROGRESS NOTE ADULT - SUBJECTIVE AND OBJECTIVE BOX
[facility] Division of Hospital Medicine  Maria L Perea MD  Pager [phone]    Patient is a 34y old  Male who presents with a chief complaint of UC flare w/ bloody diarrhea       SUBJECTIVE / OVERNIGHT EVENTS: sx cont to improved; eating well; mild cramps prior to BMs; freq of BM decreasing as well as amount of blood in stool; pt documenting via pics on phone      MEDICATIONS  (STANDING):  enoxaparin Injectable 40 milliGRAM(s) SubCutaneous every 24 hours  influenza   Vaccine 0.5 milliLiter(s) IntraMuscular once  methylPREDNISolone sodium succinate Injectable 20 milliGRAM(s) IV Push every 8 hours  pantoprazole    Tablet 40 milliGRAM(s) Oral before breakfast  sodium chloride 0.9%. 1000 milliLiter(s) (100 mL/Hr) IV Continuous <Continuous>      PHYSICAL EXAM:  Vital Signs Last 24 Hrs  T(F): 98.5 (06 Oct 2024 10:00), Max: 98.5 (06 Oct 2024 10:00)  HR: 116 (06 Oct 2024 10:00) (90 - 116)  BP: 126/88 (06 Oct 2024 10:00) (122/81 - 134/82)  RR: 18 (06 Oct 2024 10:00) (18 - 18)  SpO2: 98% (06 Oct 2024 10:00) (97% - 98%)    Parameters below as of 06 Oct 2024 04:54  Patient On (Oxygen Delivery Method): room air        CONSTITUTIONAL: NAD, appears comfortable  EYES: PERRLA; conjunctiva and sclera clear  ENMT: Moist oral mucosa; normal dentition  RESPIRATORY: Normal respiratory effort; lungs are clear to auscultation bilaterally  CARDIOVASCULAR: Regular rate and rhythm; No lower extremity edema  ABDOMEN: Nontender to palpation, normoactive bowel sounds  MUSCULOSKELETAL:  Normal gait; no clubbing or cyanosis of digits; no joint swelling or tenderness to palpation  PSYCH: A+O to person, place, and time; affect appropriate  NEUROLOGY: CN 2-12 are intact and symmetric; no gross sensory deficits   SKIN: No rashes; no palpable lesions    LABS:                        13.7   20.37 )-----------( 467      ( 06 Oct 2024 07:36 )             40.8     10-06    135  |  99  |  8   ----------------------------<  110[H]  3.9   |  26  |  0.75    Ca    8.3[L]      06 Oct 2024 07:36  Phos  4.1     10-06  Mg     2.10     10-06

## 2024-10-06 NOTE — PROGRESS NOTE ADULT - PROBLEM SELECTOR PLAN 2
- positive Cdiff as outpatient, was initiated on fidaxomicin for 10d course - completed  - ID recs appreciated   - monitor electrolytes  - contact precautions - unable to remove per infection control due to ongoing symptoms (though may be attributable to UC flare rather than Cdiff)  - per ID, no utility in extending fidaxomicin course

## 2024-10-07 LAB
ANION GAP SERPL CALC-SCNC: 13 MMOL/L — SIGNIFICANT CHANGE UP (ref 7–14)
ANISOCYTOSIS BLD QL: SLIGHT — SIGNIFICANT CHANGE UP
BASOPHILS # BLD AUTO: 0 K/UL — SIGNIFICANT CHANGE UP (ref 0–0.2)
BASOPHILS NFR BLD AUTO: 0 % — SIGNIFICANT CHANGE UP (ref 0–2)
BUN SERPL-MCNC: 8 MG/DL — SIGNIFICANT CHANGE UP (ref 7–23)
CALCIUM SERPL-MCNC: 8.5 MG/DL — SIGNIFICANT CHANGE UP (ref 8.4–10.5)
CHLORIDE SERPL-SCNC: 98 MMOL/L — SIGNIFICANT CHANGE UP (ref 98–107)
CO2 SERPL-SCNC: 24 MMOL/L — SIGNIFICANT CHANGE UP (ref 22–31)
CREAT SERPL-MCNC: 0.67 MG/DL — SIGNIFICANT CHANGE UP (ref 0.5–1.3)
CRP SERPL-MCNC: 10.9 MG/L — HIGH
CRP SERPL-MCNC: 11.4 MG/L — HIGH
EGFR: 127 ML/MIN/1.73M2 — SIGNIFICANT CHANGE UP
EOSINOPHIL # BLD AUTO: 0.29 K/UL — SIGNIFICANT CHANGE UP (ref 0–0.5)
EOSINOPHIL NFR BLD AUTO: 1 % — SIGNIFICANT CHANGE UP (ref 0–6)
GLUCOSE SERPL-MCNC: 92 MG/DL — SIGNIFICANT CHANGE UP (ref 70–99)
HCT VFR BLD CALC: 41.4 % — SIGNIFICANT CHANGE UP (ref 39–50)
HCT VFR BLD CALC: 41.9 % — SIGNIFICANT CHANGE UP (ref 39–50)
HGB BLD-MCNC: 13.8 G/DL — SIGNIFICANT CHANGE UP (ref 13–17)
HGB BLD-MCNC: 14.1 G/DL — SIGNIFICANT CHANGE UP (ref 13–17)
IANC: 17.98 K/UL — HIGH (ref 1.8–7.4)
LYMPHOCYTES # BLD AUTO: 11 % — LOW (ref 13–44)
LYMPHOCYTES # BLD AUTO: 3.14 K/UL — SIGNIFICANT CHANGE UP (ref 1–3.3)
MAGNESIUM SERPL-MCNC: 2.2 MG/DL — SIGNIFICANT CHANGE UP (ref 1.6–2.6)
MANUAL SMEAR VERIFICATION: SIGNIFICANT CHANGE UP
MCHC RBC-ENTMCNC: 27.1 PG — SIGNIFICANT CHANGE UP (ref 27–34)
MCHC RBC-ENTMCNC: 27.4 PG — SIGNIFICANT CHANGE UP (ref 27–34)
MCHC RBC-ENTMCNC: 33.3 GM/DL — SIGNIFICANT CHANGE UP (ref 32–36)
MCHC RBC-ENTMCNC: 33.7 GM/DL — SIGNIFICANT CHANGE UP (ref 32–36)
MCV RBC AUTO: 81.2 FL — SIGNIFICANT CHANGE UP (ref 80–100)
MCV RBC AUTO: 81.5 FL — SIGNIFICANT CHANGE UP (ref 80–100)
MICROCYTES BLD QL: SLIGHT — SIGNIFICANT CHANGE UP
MONOCYTES # BLD AUTO: 2.86 K/UL — HIGH (ref 0–0.9)
MONOCYTES NFR BLD AUTO: 10 % — SIGNIFICANT CHANGE UP (ref 2–14)
NEUTROPHILS # BLD AUTO: 20.28 K/UL — HIGH (ref 1.8–7.4)
NEUTROPHILS NFR BLD AUTO: 69 % — SIGNIFICANT CHANGE UP (ref 43–77)
NEUTS BAND # BLD: 2 % — SIGNIFICANT CHANGE UP (ref 0–6)
NRBC # BLD: 0 /100 WBCS — SIGNIFICANT CHANGE UP (ref 0–0)
NRBC # BLD: 0 /100 WBCS — SIGNIFICANT CHANGE UP (ref 0–0)
NRBC # FLD: 0 K/UL — SIGNIFICANT CHANGE UP (ref 0–0)
PHOSPHATE SERPL-MCNC: 3.7 MG/DL — SIGNIFICANT CHANGE UP (ref 2.5–4.5)
PLAT MORPH BLD: NORMAL — SIGNIFICANT CHANGE UP
PLATELET # BLD AUTO: 500 K/UL — HIGH (ref 150–400)
PLATELET # BLD AUTO: 501 K/UL — HIGH (ref 150–400)
PLATELET COUNT - ESTIMATE: ABNORMAL
POIKILOCYTOSIS BLD QL AUTO: SLIGHT — SIGNIFICANT CHANGE UP
POLYCHROMASIA BLD QL SMEAR: SLIGHT — SIGNIFICANT CHANGE UP
POTASSIUM SERPL-MCNC: 4 MMOL/L — SIGNIFICANT CHANGE UP (ref 3.5–5.3)
POTASSIUM SERPL-SCNC: 4 MMOL/L — SIGNIFICANT CHANGE UP (ref 3.5–5.3)
RBC # BLD: 5.1 M/UL — SIGNIFICANT CHANGE UP (ref 4.2–5.8)
RBC # BLD: 5.14 M/UL — SIGNIFICANT CHANGE UP (ref 4.2–5.8)
RBC # FLD: 12.7 % — SIGNIFICANT CHANGE UP (ref 10.3–14.5)
RBC # FLD: 13.1 % — SIGNIFICANT CHANGE UP (ref 10.3–14.5)
RBC BLD AUTO: ABNORMAL
SMUDGE CELLS # BLD: PRESENT — SIGNIFICANT CHANGE UP
SODIUM SERPL-SCNC: 135 MMOL/L — SIGNIFICANT CHANGE UP (ref 135–145)
VARIANT LYMPHS # BLD: 7 % — HIGH (ref 0–6)
WBC # BLD: 28.57 K/UL — HIGH (ref 3.8–10.5)
WBC # BLD: 29.41 K/UL — HIGH (ref 3.8–10.5)
WBC # FLD AUTO: 28.57 K/UL — HIGH (ref 3.8–10.5)
WBC # FLD AUTO: 29.41 K/UL — HIGH (ref 3.8–10.5)

## 2024-10-07 PROCEDURE — 99233 SBSQ HOSP IP/OBS HIGH 50: CPT

## 2024-10-07 PROCEDURE — 93010 ELECTROCARDIOGRAM REPORT: CPT

## 2024-10-07 PROCEDURE — 99232 SBSQ HOSP IP/OBS MODERATE 35: CPT

## 2024-10-07 RX ADMIN — METHYLPREDNISOLONE ACETATE 20 MILLIGRAM(S): 80 INJECTION, SUSPENSION INTRA-ARTICULAR; INTRALESIONAL; INTRAMUSCULAR; SOFT TISSUE at 00:59

## 2024-10-07 RX ADMIN — PANTOPRAZOLE SODIUM 40 MILLIGRAM(S): 40 TABLET, DELAYED RELEASE ORAL at 05:21

## 2024-10-07 RX ADMIN — METHYLPREDNISOLONE ACETATE 20 MILLIGRAM(S): 80 INJECTION, SUSPENSION INTRA-ARTICULAR; INTRALESIONAL; INTRAMUSCULAR; SOFT TISSUE at 18:00

## 2024-10-07 RX ADMIN — METHYLPREDNISOLONE ACETATE 20 MILLIGRAM(S): 80 INJECTION, SUSPENSION INTRA-ARTICULAR; INTRALESIONAL; INTRAMUSCULAR; SOFT TISSUE at 10:53

## 2024-10-07 NOTE — PROGRESS NOTE ADULT - ASSESSMENT
35 y/o M with pmhx of UC comes to the ED with UC flare and C Diff. On admission, labs significant for elevated WBC and CT A/P showing UC flare. Patient follows with GI (Abran) at [facility], came here due to living close to hospital. Patient has been on Entyvio but was recently switched to [facility] outpatient. Inpatient, he was treated for C. Diff and started on steroids. He has had persistent bloody diarrhea through steroids so Remicade was started 10/3. Patient also received flex sig 10/3. Surgery consulted to establish care. Patient improving on Remicade with decreased diarrhea and bloody stools.     Plan:  - Continue pain management  - Remicaide per GI   - Patient can follow up outpatient with Dr. Lu if symptoms worsen for surgical planning     A-Team  [id number]   35 y/o M with pmhx of UC comes to the ED with UC flare and C Diff. On admission, labs significant for elevated WBC and CT A/P showing UC flare. Patient follows with GI (Abran) at [facility], came here due to living close to hospital. Patient has been on Entyvio but was recently switched to [facility] outpatient. Inpatient, he was treated for C. Diff and started on steroids. He has had persistent bloody diarrhea through steroids so Remicade was started 10/3. Patient also received flex sig 10/3. Surgery consulted to establish care. Patient improving on Remicade with decreased diarrhea and bloody stools.     Plan:  - Continue pain management  - Remicaide per GI   - Patient can follow up outpatient with Dr. Lu in 2 weeks for surgical planning     A-Team  [id number]

## 2024-10-07 NOTE — PROGRESS NOTE ADULT - SUBJECTIVE AND OBJECTIVE BOX
[facility] Division of Hospital Medicine  Maria L Perea MD  Pager [phone]    Patient is a 34y old  Male who presents with a chief complaint of UC flare w/ bloody diarrhea      SUBJECTIVE / OVERNIGHT EVENTS: overall pt reports clinical improvement, stool less liquid, only abd cramps prior to BM ans resolve with BM; little to no blood noted with BM; pt states he noted rise in WBC and CRP, pt also states he drank buttermilk yesterday that his family brought for him which is usually a trigger for him; advised pt to avoid any known triggers while in the midst of treatment for a severe flare      MEDICATIONS  (STANDING):  enoxaparin Injectable 40 milliGRAM(s) SubCutaneous every 24 hours  influenza   Vaccine 0.5 milliLiter(s) IntraMuscular once  methylPREDNISolone sodium succinate Injectable 20 milliGRAM(s) IV Push every 8 hours  pantoprazole    Tablet 40 milliGRAM(s) Oral before breakfast  sodium chloride 0.9%. 1000 milliLiter(s) (100 mL/Hr) IV Continuous <Continuous>    MEDICATIONS  (PRN):  melatonin 3 milliGRAM(s) Oral at bedtime PRN Insomnia    PHYSICAL EXAM:  Vital Signs Last 24 Hrs  T(F): 98.4 (07 Oct 2024 12:39), Max: 98.4 (07 Oct 2024 12:39)  HR: 118 (07 Oct 2024 12:39) (97 - 118)  BP: 123/81 (07 Oct 2024 12:39) (117/78 - 134/89)  RR: 18 (07 Oct 2024 12:39) (17 - 18)  SpO2: 99% (07 Oct 2024 12:39) (97% - 99%)    Parameters below as of 07 Oct 2024 12:39  Patient On (Oxygen Delivery Method): room air        CONSTITUTIONAL: NAD, appears comfortable  EYES: PERRLA; conjunctiva and sclera clear  ENMT: Moist oral mucosa; normal dentition  RESPIRATORY: Normal respiratory effort; lungs are clear to auscultation bilaterally  CARDIOVASCULAR: Regular rate and rhythm; No lower extremity edema  ABDOMEN: Nontender to palpation, normoactive bowel sounds  MUSCULOSKELETAL: no clubbing or cyanosis of digits; no joint swelling or tenderness to palpation  PSYCH: A+O to person, place, and time; affect appropriate  NEUROLOGY: CN 2-12 are intact and symmetric; no gross sensory deficits   SKIN: No rashes; no palpable lesions    LABS:                        13.8   28.57 )-----------( 500      ( 07 Oct 2024 10:00 )             41.4     10-07    135  |  98  |  8   ----------------------------<  92  4.0   |  24  |  0.67    Ca    8.5      07 Oct 2024 06:00  Phos  3.7     10-07  Mg     2.20     10-07

## 2024-10-07 NOTE — PROGRESS NOTE ADULT - SUBJECTIVE AND OBJECTIVE BOX
GENERAL SURGERY PROGRESS NOTE    Patient: RADHA BUCIO , 34y ([birth year])Male   MRN: [id number]  Location: [address]  Visit: 09-26-24 Inpatient  Date: 10-07-24 @ 06:54    Subjective: No acute events overnight. Patient resting comfortably in bed, no complaints.     PAST MEDICAL & SURGICAL HISTORY:  Ulcerative colitis      No significant past surgical history          Vitals: T(F): 97.2 (10-07-24 @ 05:05), Max: 98.5 (10-06-24 @ 10:00)  HR: 101 (10-07-24 @ 05:05)  BP: 134/89 (10-07-24 @ 05:05)  RR: 17 (10-07-24 @ 05:05)  SpO2: 99% (10-07-24 @ 05:05)      Diet, Regular:   Fiber/Residue Restricted (LOWFIBER)  No Concentrated Phosphorus  Halal        PHYSICAL EXAM  GEN: No acute distress   CV: RRR, no JVD  LUNGS: Respirations unlabored, no use of accessory muscles  ABD: Abdomen soft, ND, NT   EXT: No peripheral edema. Regular range of motion.       MEDICATIONS  (STANDING):  enoxaparin Injectable 40 milliGRAM(s) SubCutaneous every 24 hours  influenza   Vaccine 0.5 milliLiter(s) IntraMuscular once  methylPREDNISolone sodium succinate Injectable 20 milliGRAM(s) IV Push every 8 hours  pantoprazole    Tablet 40 milliGRAM(s) Oral before breakfast  sodium chloride 0.9%. 1000 milliLiter(s) (100 mL/Hr) IV Continuous <Continuous>    MEDICATIONS  (PRN):  melatonin 3 milliGRAM(s) Oral at bedtime PRN Insomnia      DVT PROPHYLAXIS: SCDs, enoxaparin Injectable 40 milliGRAM(s) SubCutaneous every 24 hours    GI PROPHYLAXIS: pantoprazole    Tablet 40 milliGRAM(s) Oral before breakfast    ANTICOAGULATION:   ANTIBIOTICS:       LAB/STUDIES:  CAPILLARY BLOOD GLUCOSE                              13.7   20.37 )-----------( 467      ( 06 Oct 2024 07:36 )             40.8     10-06    135  |  99  |  8   ----------------------------<  110[H]  3.9   |  26  |  0.75    Ca    8.3[L]      06 Oct 2024 07:36  Phos  4.1     10-06  Mg     2.10     10-06          Urinalysis Basic - ( 06 Oct 2024 07:36 )    Color: x / Appearance: x / SG: x / pH: x  Gluc: 110 mg/dL / Ketone: x  / Bili: x / Urobili: x   Blood: x / Protein: x / Nitrite: x   Leuk Esterase: x / RBC: x / WBC x   Sq Epi: x / Non Sq Epi: x / Bacteria: x

## 2024-10-07 NOTE — PROGRESS NOTE ADULT - SUBJECTIVE AND OBJECTIVE BOX
GI progress note    Chief Complaint:  Patient is a 34y old  Male who presents with a chief complaint of UC flare w/ bloody diarrhea (04 Oct 2024 15:02)    Interval Events:   -feeling a bit better today compared to yesterday, had 5 BMs in the last 12 hr, less blood than before  -[place] 6 for stool consistency  -CRP uptrended to 10.9 from 7.8    Allergies:  No Known Allergies  mesalamine (Other (Mod to Severe))      Hospital Medications:  enoxaparin Injectable 40 milliGRAM(s) SubCutaneous every 24 hours  inFLIXimab-dyyb (INFLECTRA) IVPB 500 milliGRAM(s) IV Intermittent once  influenza   Vaccine 0.5 milliLiter(s) IntraMuscular once  methylPREDNISolone sodium succinate Injectable 20 milliGRAM(s) IV Push every 8 hours  pantoprazole    Tablet 40 milliGRAM(s) Oral before breakfast  sodium chloride 0.9%. 1000 milliLiter(s) IV Continuous <Continuous>        PHYSICAL EXAM:   Vital Signs:  Vital Signs Last 24 Hrs  T(C): 36.7 (05 Oct 2024 05:00), Max: 36.8 (04 Oct 2024 18:31)  T(F): 98 (05 Oct 2024 05:00), Max: 98.2 (04 Oct 2024 18:31)  HR: 89 (05 Oct 2024 05:00) (89 - 108)  BP: 120/65 (05 Oct 2024 05:00) (120/65 - 137/89)  BP(mean): --  RR: 18 (05 Oct 2024 05:00) (17 - 18)  SpO2: 98% (05 Oct 2024 05:00) (98% - 100%)    Parameters below as of 05 Oct 2024 05:00  Patient On (Oxygen Delivery Method): room air      Daily     Daily     GENERAL:  No acute distress  HEENT:  NCAT, no scleral icterus  CHEST: no resp distress  HEART:  RRR  ABDOMEN:  Soft, non-tender, non-distended  EXTREMITIES:  No cyanosis, clubbing, or edema  SKIN:  No rash/erythema/ecchymoses/petechiae/wounds/abscess/warm/dry  NEURO:  Alert and oriented x 3, no asterixis, no tremor    LABS:                        13.9   22.21 )-----------( 476      ( 05 Oct 2024 06:22 )             41.4     Mean Cell Volume: 81.8 fL (10-05-24 @ 06:22)    10-05    134[L]  |  99  |  8   ----------------------------<  109[H]  3.8   |  23  |  0.79    Ca    8.5      05 Oct 2024 06:22  Phos  3.7     10-05  Mg     2.20     10-05          Urinalysis Basic - ( 05 Oct 2024 06:22 )    Color: x / Appearance: x / SG: x / pH: x  Gluc: 109 mg/dL / Ketone: x  / Bili: x / Urobili: x   Blood: x / Protein: x / Nitrite: x   Leuk Esterase: x / RBC: x / WBC x   Sq Epi: x / Non Sq Epi: x / Bacteria: x            Imaging:           GI progress note    Chief Complaint:  Patient is a 34y old  Male who presents with a chief complaint of UC flare w/ bloody diarrhea (04 Oct 2024 15:02)    Interval Events:   -feeling a bit better today compared to yesterday, had 5 BMs in the last 12 hr, less blood than before.   -[place] 6 for stool consistency  -CRP uptrended to 10.9 from 7.8  -WBC uptrended to ~29k from 20k  -spoke with [facility]'s IBD team and discussed case re uptrending WBC and CRP. Recommending colectomy    Allergies:  No Known Allergies  mesalamine (Other (Mod to Severe))      Hospital Medications:  enoxaparin Injectable 40 milliGRAM(s) SubCutaneous every 24 hours  inFLIXimab-dyyb (INFLECTRA) IVPB 500 milliGRAM(s) IV Intermittent once  influenza   Vaccine 0.5 milliLiter(s) IntraMuscular once  methylPREDNISolone sodium succinate Injectable 20 milliGRAM(s) IV Push every 8 hours  pantoprazole    Tablet 40 milliGRAM(s) Oral before breakfast  sodium chloride 0.9%. 1000 milliLiter(s) IV Continuous <Continuous>        PHYSICAL EXAM:   Vital Signs:  Vital Signs Last 24 Hrs  T(C): 36.7 (05 Oct 2024 05:00), Max: 36.8 (04 Oct 2024 18:31)  T(F): 98 (05 Oct 2024 05:00), Max: 98.2 (04 Oct 2024 18:31)  HR: 89 (05 Oct 2024 05:00) (89 - 108)  BP: 120/65 (05 Oct 2024 05:00) (120/65 - 137/89)  BP(mean): --  RR: 18 (05 Oct 2024 05:00) (17 - 18)  SpO2: 98% (05 Oct 2024 05:00) (98% - 100%)    Parameters below as of 05 Oct 2024 05:00  Patient On (Oxygen Delivery Method): room air      Daily     Daily     GENERAL:  No acute distress  HEENT:  NCAT, no scleral icterus  CHEST: no resp distress  HEART:  RRR  ABDOMEN:  Soft, non-tender, non-distended  EXTREMITIES:  No cyanosis, clubbing, or edema  NEURO:  Alert and oriented x 3    LABS:                        13.9   22.21 )-----------( 476      ( 05 Oct 2024 06:22 )             41.4     Mean Cell Volume: 81.8 fL (10-05-24 @ 06:22)    10-05    134[L]  |  99  |  8   ----------------------------<  109[H]  3.8   |  23  |  0.79    Ca    8.5      05 Oct 2024 06:22  Phos  3.7     10-05  Mg     2.20     10-05          Urinalysis Basic - ( 05 Oct 2024 06:22 )    Color: x / Appearance: x / SG: x / pH: x  Gluc: 109 mg/dL / Ketone: x  / Bili: x / Urobili: x   Blood: x / Protein: x / Nitrite: x   Leuk Esterase: x / RBC: x / WBC x   Sq Epi: x / Non Sq Epi: x / Bacteria: x            Imaging:

## 2024-10-07 NOTE — PROVIDER CONTACT NOTE (OTHER) - SITUATION
-120, pt asymptomatic, afebrile, BP wnl. EKG was done (sent to provider via Teams)
Pt hr is 105, notified per parameter protocol.

## 2024-10-07 NOTE — PROVIDER CONTACT NOTE (OTHER) - REASON
Pt hr is 105, notified per parameter protocol.
-120, pt asymptomatic, afebrile, BP wnl. EKG was done (sent to provider via Teams)

## 2024-10-07 NOTE — PROGRESS NOTE ADULT - PROBLEM SELECTOR PLAN 1
- ongoing flare for 3 months notable for episodes of bloody diarrhea  - routinely follows with GI Dr Edwin Osullivan at [facility]; previously on Entyvio and recently switched to Stelara (s/p loading dose, 1 maintenance dose)  - currently receiving IV steroids (methylprednisolone 20mg q8h) - PPI ppx and glucose monitoring  - s/p flex-sig 10/3 - biopsy taken, severe UC noted  - s/p infliximab x2  sx improving  noted rise in WBC and CRP this AM, of note pt consumed buttermilk yesterday which he identifies as a known trigger  apprec GI involvement, concerned with rising labs, colectomy now being discussed

## 2024-10-07 NOTE — PROGRESS NOTE ADULT - ASSESSMENT
34M PMH ulcerative colitis following with Dr. Osullivan of [facility] presents with 8-10 episodes of bloody diarrhea during the day with further episodes of bloody diarrhea at night, advised to come in to hospital for treatment of UC flare.

## 2024-10-07 NOTE — PROGRESS NOTE ADULT - ASSESSMENT
35 yo M with pmhx of UC comes to the ED after experiencing worsening diarrhea for the past one month with outpatient stool studies positive for C Diff and initiation of fidaxomicin. On admission, labs significant for elevated WBC and CT A/P showing UC flare. GI consulted for further management    Impression:  #UC flare  #C diff+  #bloody diarrhea   - patient with hx of UC and trialed on multiple biologics and steroid taper  - also found to be C diff + outpatient   - overall would favor presentation to be UC flare due to bloody diarrhea, as C Diff does not usually cause hematochezia  - HBV serologies neg for HBV  - per [facility], quant gold neg on 7/10/24  - stool Cx neg  - s/p remicade IV 500mg x 1 on 10/2 and then second dose of 500mg on 10/5  - s/p colonoscopy on 10/3/24, severe UC pancolitis w/ Leahy score 3. S/p biopsies for disease activity and CMV.  - today, feeling a bit better, but CRP uptrending  - fecal calprotectin 3740    Recommendations:  - c/w solumedrol IV 20mg q8hrs  - s/p 10 day course of fidaxomicin  - monitor stool counts  - please trend CBC, CMP, and CRP daily  - low residue diet  - appreciate colorectal surgery consult  - c/w DVT ppx as patients with UC flare are high risk for VTE  - avoid opioids, anti-cholinergics, and anti-diarrheals as these can predispose to toxic megacolon  - avoid NSAIDs     Note incomplete until finalized by attending signature/attestation.    Bentley Nichols, PGY-4  Gastroenterology & Hepatology Fellow  Available on TEAMS  Long range pager #: [phone]  Short range pager#: [phone]    For non-urgent consults, please send email to [email or web address] (for [facility]) or [email or web address] (for [facility])   35 yo M with pmhx of UC comes to the ED after experiencing worsening diarrhea for the past one month with outpatient stool studies positive for C Diff and initiation of fidaxomicin. On admission, labs significant for elevated WBC and CT A/P showing UC flare. GI consulted for further management    Impression:  #UC flare  #C diff+  #bloody diarrhea   - patient with hx of UC and trialed on multiple biologics and steroid taper  - also found to be C diff + outpatient   - overall would favor presentation to be UC flare due to bloody diarrhea, as C Diff does not usually cause hematochezia  - HBV serologies neg for HBV  - per [facility], quant gold neg on 7/10/24  - stool Cx neg  - s/p remicade IV 500mg x 1 on 10/2 and then second dose of 500mg on 10/5  - s/p colonoscopy on 10/3/24, severe UC pancolitis w/ Leahy score 3. S/p biopsies for disease activity and CMV.  - today, feeling a bit better, but CRP uptrending  - fecal calprotectin 3740    Recommendations:  - c/w solumedrol IV 20mg q8hrs  - serial abdominal exam; if becomes febrile, more tachycardic, worsening CRP/leukocytosis, or new abdominal exam findings, would obtain CT to r/o micro perforations   - s/p 10 day course of fidaxomicin  - monitor stool counts  - please trend CBC, CMP, and CRP daily  - low residue diet  - appreciate colorectal surgery consult  - c/w DVT ppx as patients with UC flare are high risk for VTE  - avoid opioids, anti-cholinergics, and anti-diarrheals as these can predispose to toxic megacolon  - avoid NSAIDs     Note incomplete until finalized by attending signature/attestation.    Bentley Nichols, PGY-4  Gastroenterology & Hepatology Fellow  Available on TEAMS  Long range pager #: [phone]  Short range pager#: [phone]    For non-urgent consults, please send email to [email or web address] (for [facility]) or [email or web address] (for [facility])   33 yo M with pmhx of UC comes to the ED after experiencing worsening diarrhea for the past one month with outpatient stool studies positive for C Diff and initiation of fidaxomicin. On admission, labs significant for elevated WBC and CT A/P showing UC flare. GI consulted for further management    Impression:  #UC flare  #C diff+  #bloody diarrhea   - patient with hx of UC and trialed on multiple biologics and steroid taper  - also found to be C diff + outpatient   - overall would favor presentation to be UC flare due to bloody diarrhea, as C Diff does not usually cause hematochezia  - HBV serologies neg for HBV  - per [facility], quant gold neg on 7/10/24  - stool Cx neg  - s/p remicade IV 500mg x 1 on 10/2 and then second dose of 500mg on 10/5  - s/p colonoscopy on 10/3/24, severe UC pancolitis w/ Leahy score 3. S/p biopsies for disease activity and CMV.  - fecal calprotectin 3740  - today, patient feels subjectively better with 5 BMs in last 12 hrs, but CRP and WBC uptrending. Concerned re micro-perforation given longstanding steroid use and uptrending biomarkers. However, given patient's improving sx, will need to repeat labs to ensure accuracy of uptrending markers    Recommendations:  - c/w solumedrol IV 20mg q8hrs  - please repeat CBC and CRP STAT (ordered)  - will discuss the case re colorectal surgery re possible colectomy  - serial abdominal exam; if becomes febrile, more tachycardic, worsening CRP/leukocytosis, or new abdominal exam findings, would obtain CT A/P stat to r/o micro perforations   - s/p 10 day course of fidaxomicin  - monitor stool counts  - please trend CBC, CMP, and CRP daily  - low residue diet  - appreciate colorectal surgery consult  - c/w DVT ppx as patients with UC flare are high risk for VTE  - avoid opioids, anti-cholinergics, and anti-diarrheals as these can predispose to toxic megacolon  - avoid NSAIDs     Note incomplete until finalized by attending signature/attestation.    Bentley Nichols, PGY-4  Gastroenterology & Hepatology Fellow  Available on TEAMS  Long range pager #: [phone]  Short range pager#: [phone]    For non-urgent consults, please send email to [email or web address] (for [email or web address]) or [email or web address] (for [facility])   35 yo M with pmhx of UC comes to the ED after experiencing worsening diarrhea for the past one month with outpatient stool studies positive for C Diff and initiation of fidaxomicin. On admission, labs significant for elevated WBC and CT A/P showing UC flare. GI consulted for further management    Impression:  #UC flare  #C diff+  #bloody diarrhea   - patient with hx of UC and trialed on multiple biologics and steroid taper  - also found to be C diff + outpatient   - overall would favor presentation to be UC flare due to bloody diarrhea, as C Diff does not usually cause hematochezia  - HBV serologies neg for HBV  - per [facility], quant gold neg on 7/10/24  - stool Cx neg  - s/p remicade IV 500mg x 1 on 10/2 and then second dose of 500mg on 10/5  - s/p colonoscopy on 10/3/24, severe UC pancolitis w/ Leahy score 3. S/p biopsies for disease activity and CMV.  - fecal calprotectin 3740  - today, patient feels subjectively better with 5 BMs in last 12 hrs, but CRP and WBC uptrending. Concerned re micro-perforation given longstanding steroid use and uptrending biomarkers. However, given patient's improving sx, will need to repeat labs to ensure accuracy of uptrending markers    Recommendations:  - c/w solumedrol IV 20mg q8hrs  - please repeat CBC and CRP STAT (ordered)  - will discuss the case re colorectal surgery re possible colectomy  - serial abdominal exam; if becomes febrile, more tachycardic, worsening CRP/leukocytosis, or new abdominal exam findings, would stat page colorectal surgery  - s/p 10 day course of fidaxomicin  - monitor stool counts  - please trend CBC, CMP, and CRP daily  - low residue diet  - appreciate colorectal surgery consult  - c/w DVT ppx as patients with UC flare are high risk for VTE  - avoid opioids, anti-cholinergics, and anti-diarrheals as these can predispose to toxic megacolon  - avoid NSAIDs     Note incomplete until finalized by attending signature/attestation.    Bentley Nichols, PGY-4  Gastroenterology & Hepatology Fellow  Available on TEAMS  Long range pager #: [phone]  Short range pager#: [phone]    For non-urgent consults, please send email to [email or web address] (for [facility]) or [email or web address] (for [facility])   33 yo M with pmhx of UC comes to the ED after experiencing worsening diarrhea for the past one month with outpatient stool studies positive for C Diff and initiation of fidaxomicin. On admission, labs significant for elevated WBC and CT A/P showing UC flare. GI consulted for further management    Impression:  #UC flare  #C diff+  #bloody diarrhea   - patient with hx of UC and trialed on multiple biologics and steroid taper  - also found to be C diff + outpatient   - overall would favor presentation to be UC flare due to bloody diarrhea, as C Diff does not usually cause hematochezia  - HBV serologies neg for HBV  - per [facility], quant gold neg on 7/10/24  - stool Cx neg  - s/p remicade IV 500mg x 1 on 10/2 and then second dose of 500mg on 10/5  - s/p colonoscopy on 10/3/24, severe UC pancolitis w/ Leahy score 3. S/p biopsies for disease activity and CMV.  - fecal calprotectin 3740  - today, patient feels subjectively better with 5 BMs in last 12 hrs, but CRP and WBC uptrending. Concerned re micro-perforation given longstanding steroid use and uptrending biomarkers. However, given patient's improving sx, will need to repeat labs to ensure accuracy of uptrending markers    Recommendations:  - c/w solumedrol IV 20mg q8hrs  - please repeat CBC and CRP STAT (ordered)  - will discuss the case re colorectal surgery re possible colectomy  - serial abdominal exam; if becomes febrile, more tachycardic, worsening CRP/leukocytosis, or new abdominal exam findings, would obtain CT and stat page colorectal surgery  - s/p 10 day course of fidaxomicin  - monitor stool counts  - please trend CBC, CMP, and CRP daily  - low residue diet  - appreciate colorectal surgery consult  - c/w DVT ppx as patients with UC flare are high risk for VTE  - avoid opioids, anti-cholinergics, and anti-diarrheals as these can predispose to toxic megacolon  - avoid NSAIDs     Note incomplete until finalized by attending signature/attestation.    Bentley Nichols, PGY-4  Gastroenterology & Hepatology Fellow  Available on TEAMS  Long range pager #: [phone]  Short range pager#: [phone]    For non-urgent consults, please send email to [email or web address] (for [email or web address]) or [email or web address] (for [email or web address])

## 2024-10-08 LAB
ADD ON TEST-SPECIMEN IN LAB: SIGNIFICANT CHANGE UP
ANION GAP SERPL CALC-SCNC: 9 MMOL/L — SIGNIFICANT CHANGE UP (ref 7–14)
BASOPHILS # BLD AUTO: 0.1 K/UL — SIGNIFICANT CHANGE UP (ref 0–0.2)
BASOPHILS NFR BLD AUTO: 0.3 % — SIGNIFICANT CHANGE UP (ref 0–2)
BUN SERPL-MCNC: 8 MG/DL — SIGNIFICANT CHANGE UP (ref 7–23)
CALCIUM SERPL-MCNC: 8.6 MG/DL — SIGNIFICANT CHANGE UP (ref 8.4–10.5)
CHLORIDE SERPL-SCNC: 101 MMOL/L — SIGNIFICANT CHANGE UP (ref 98–107)
CO2 SERPL-SCNC: 25 MMOL/L — SIGNIFICANT CHANGE UP (ref 22–31)
CREAT SERPL-MCNC: 0.71 MG/DL — SIGNIFICANT CHANGE UP (ref 0.5–1.3)
CRP SERPL-MCNC: 12.4 MG/L — HIGH
EGFR: 125 ML/MIN/1.73M2 — SIGNIFICANT CHANGE UP
EOSINOPHIL # BLD AUTO: 0.08 K/UL — SIGNIFICANT CHANGE UP (ref 0–0.5)
EOSINOPHIL NFR BLD AUTO: 0.3 % — SIGNIFICANT CHANGE UP (ref 0–6)
GLUCOSE SERPL-MCNC: 122 MG/DL — HIGH (ref 70–99)
HCT VFR BLD CALC: 43 % — SIGNIFICANT CHANGE UP (ref 39–50)
HGB BLD-MCNC: 14.3 G/DL — SIGNIFICANT CHANGE UP (ref 13–17)
IANC: 22.51 K/UL — HIGH (ref 1.8–7.4)
IMM GRANULOCYTES NFR BLD AUTO: 1.1 % — HIGH (ref 0–0.9)
LYMPHOCYTES # BLD AUTO: 15.9 % — SIGNIFICANT CHANGE UP (ref 13–44)
LYMPHOCYTES # BLD AUTO: 4.72 K/UL — HIGH (ref 1–3.3)
MAGNESIUM SERPL-MCNC: 2.2 MG/DL — SIGNIFICANT CHANGE UP (ref 1.6–2.6)
MCHC RBC-ENTMCNC: 27.3 PG — SIGNIFICANT CHANGE UP (ref 27–34)
MCHC RBC-ENTMCNC: 33.3 GM/DL — SIGNIFICANT CHANGE UP (ref 32–36)
MCV RBC AUTO: 82.2 FL — SIGNIFICANT CHANGE UP (ref 80–100)
MONOCYTES # BLD AUTO: 1.86 K/UL — HIGH (ref 0–0.9)
MONOCYTES NFR BLD AUTO: 6.3 % — SIGNIFICANT CHANGE UP (ref 2–14)
NEUTROPHILS # BLD AUTO: 22.51 K/UL — HIGH (ref 1.8–7.4)
NEUTROPHILS NFR BLD AUTO: 76.1 % — SIGNIFICANT CHANGE UP (ref 43–77)
NRBC # BLD: 0 /100 WBCS — SIGNIFICANT CHANGE UP (ref 0–0)
NRBC # FLD: 0 K/UL — SIGNIFICANT CHANGE UP (ref 0–0)
PHOSPHATE SERPL-MCNC: 3.6 MG/DL — SIGNIFICANT CHANGE UP (ref 2.5–4.5)
PLATELET # BLD AUTO: 480 K/UL — HIGH (ref 150–400)
POTASSIUM SERPL-MCNC: 4.2 MMOL/L — SIGNIFICANT CHANGE UP (ref 3.5–5.3)
POTASSIUM SERPL-SCNC: 4.2 MMOL/L — SIGNIFICANT CHANGE UP (ref 3.5–5.3)
RBC # BLD: 5.23 M/UL — SIGNIFICANT CHANGE UP (ref 4.2–5.8)
RBC # FLD: 13 % — SIGNIFICANT CHANGE UP (ref 10.3–14.5)
SODIUM SERPL-SCNC: 135 MMOL/L — SIGNIFICANT CHANGE UP (ref 135–145)
WBC # BLD: 29.6 K/UL — HIGH (ref 3.8–10.5)
WBC # FLD AUTO: 29.6 K/UL — HIGH (ref 3.8–10.5)

## 2024-10-08 PROCEDURE — 99232 SBSQ HOSP IP/OBS MODERATE 35: CPT

## 2024-10-08 PROCEDURE — 99233 SBSQ HOSP IP/OBS HIGH 50: CPT

## 2024-10-08 RX ADMIN — METHYLPREDNISOLONE ACETATE 20 MILLIGRAM(S): 80 INJECTION, SUSPENSION INTRA-ARTICULAR; INTRALESIONAL; INTRAMUSCULAR; SOFT TISSUE at 01:04

## 2024-10-08 RX ADMIN — PANTOPRAZOLE SODIUM 40 MILLIGRAM(S): 40 TABLET, DELAYED RELEASE ORAL at 05:29

## 2024-10-08 RX ADMIN — METHYLPREDNISOLONE ACETATE 20 MILLIGRAM(S): 80 INJECTION, SUSPENSION INTRA-ARTICULAR; INTRALESIONAL; INTRAMUSCULAR; SOFT TISSUE at 18:02

## 2024-10-08 RX ADMIN — METHYLPREDNISOLONE ACETATE 20 MILLIGRAM(S): 80 INJECTION, SUSPENSION INTRA-ARTICULAR; INTRALESIONAL; INTRAMUSCULAR; SOFT TISSUE at 10:50

## 2024-10-08 NOTE — PROGRESS NOTE ADULT - ASSESSMENT
33 y/o M with pmhx of UC comes to the ED with UC flare and C Diff. On admission, labs significant for elevated WBC and CT A/P showing UC flare. Patient follows with GI (Abran) at [facility], came here due to living close to hospital. Patient has been on Entyvio but was recently switched to [facility] outpatient. Inpatient, he was treated for C. Diff and started on steroids. He has had persistent bloody diarrhea through steroids so Remicade was started 10/3. Patient also received flex sig 10/3. Surgery consulted to establish care. Patient improving on Remicade with decreased diarrhea and bloody stools.     Plan:  - Continue pain management  - Remicaide per GI   - Patient more amenable to surgery at this time   - If symptoms/labs don't improve tomorrow (10/9), plan for transfer Thursday to [place] for colectomy Friday with Dr. Cassandra DEL CID-Team  [id number]   33 y/o M with pmhx of UC comes to the ED with UC flare and C Diff. On admission, labs significant for elevated WBC and CT A/P showing UC flare. Patient follows with GI (Abran) at [facility], came here due to living close to hospital. Patient has been on Entyvio but was recently switched to [facility] outpatient. Inpatient, he was treated for C. Diff and started on steroids. He has had persistent bloody diarrhea through steroids so Remicade was started 10/3. Patient also received flex sig 10/3. Surgery consulted to establish care. Some symptomatic improvement on Remicaide, however worsening WBC and CRP.     Plan:  - Continue pain management  - Remicaide per GI   - Patient more amenable to surgery at this time   - If symptoms/labs don't improve tomorrow (10/9), plan for transfer Thursday to [place] for colectomy Friday with Dr. Cassandra DEL CID-Team  [id number]

## 2024-10-08 NOTE — PROGRESS NOTE ADULT - PROBLEM SELECTOR PLAN 1
- ongoing flare for 3 months notable for episodes of bloody diarrhea  - routinely follows with GI Dr Edwin Osullivan at [facility]; previously on Entyvio and recently switched to Stelara (s/p loading dose, 1 maintenance dose)  - currently receiving IV steroids (methylprednisolone 20mg q8h) - PPI ppx and glucose monitoring  - s/p flex-sig 10/3 - biopsy taken, severe UC noted  - s/p infliximab x2  sx improving  noted rise in WBC, apprec GI involvement, concerned with rising labs, colectomy now being discussed  awaiting sx input

## 2024-10-08 NOTE — PROGRESS NOTE ADULT - ASSESSMENT
35 yo M with pmhx of UC comes to the ED after experiencing worsening diarrhea for the past one month with outpatient stool studies positive for C Diff and initiation of fidaxomicin. On admission, labs significant for elevated WBC and CT A/P showing UC flare. GI consulted for further management    Impression:  #UC flare  #C diff+  #bloody diarrhea   - patient with hx of UC and trialed on multiple biologics and steroid taper  - also found to be C diff + outpatient   - overall would favor presentation to be UC flare due to bloody diarrhea, as C Diff does not usually cause hematochezia  - HBV serologies neg for HBV  - per [facility], quant gold neg on 7/10/24  - stool Cx neg  - s/p remicade IV 500mg x 1 on 10/2 and then second dose of 500mg on 10/5  - s/p colonoscopy on 10/3/24, severe UC pancolitis w/ Leahy score 3. S/p biopsies for disease activity and CMV.  - fecal calprotectin 3740  - today, patient feels subjectively similar to yesterday but CRP and WBC relatively unchanged from yesterday; rather, CRP is uptrending in the last few days. Given that patient's CRP remains uptrending, despite the symptomatic improvement, patient is having an inadequate response to remicade and is indicated for surgical intervention. Discussed at length with patient re his response to remicade and his benefits vs risks of surgical intervention. Colorectal surgery already onboard and will discuss with patient again.      Recommendations:  - c/w solumedrol IV 20mg q8hrs for now  - appreciate colorectal surgery recs re colectomy timing  - serial abdominal exam; if becomes febrile, more tachycardic, worsening CRP/leukocytosis, and new abdominal exam findings, would obtain CT and stat page colorectal surgery  - s/p 10 day course of fidaxomicin  - monitor stool counts  - please trend CBC, CMP, and CRP daily  - low residue diet  - c/w DVT ppx as patients with UC flare are high risk for VTE  - avoid opioids, anti-cholinergics, and anti-diarrheals as these can predispose to toxic megacolon  - avoid NSAIDs     Note incomplete until finalized by attending signature/attestation.    Bentley Nichols, PGY-4  Gastroenterology & Hepatology Fellow  Available on TEAMS  Long range pager #: [phone]  Short range pager#: [phone]    For non-urgent consults, please send email to [email or web address] (for [facility]) or [email or web address] (for [facility])

## 2024-10-08 NOTE — PROGRESS NOTE ADULT - SUBJECTIVE AND OBJECTIVE BOX
GI progress note    Chief Complaint:  Patient is a 34y old  Male who presents with a chief complaint of UC flare w/ bloody diarrhea.     Interval Events:   - feels similar to yesterday; had 5 BMs in last 12hrs, even less blood than before.   - WBC and CRP similar to yesterday; CRP has been uptrending in the last few days  - extensively spoke to patient re his inadequate response to remicade given insignificant changes in CRP after 2nd dose of remicade     Allergies:  No Known Allergies  mesalamine (Other (Mod to Severe))      Hospital Medications:  enoxaparin Injectable 40 milliGRAM(s) SubCutaneous every 24 hours  inFLIXimab-dyyb (INFLECTRA) IVPB 500 milliGRAM(s) IV Intermittent once  influenza   Vaccine 0.5 milliLiter(s) IntraMuscular once  methylPREDNISolone sodium succinate Injectable 20 milliGRAM(s) IV Push every 8 hours  pantoprazole    Tablet 40 milliGRAM(s) Oral before breakfast  sodium chloride 0.9%. 1000 milliLiter(s) IV Continuous <Continuous>        PHYSICAL EXAM:   Vital Signs:  Vital Signs Last 24 Hrs  T(C): 36.7 (05 Oct 2024 05:00), Max: 36.8 (04 Oct 2024 18:31)  T(F): 98 (05 Oct 2024 05:00), Max: 98.2 (04 Oct 2024 18:31)  HR: 89 (05 Oct 2024 05:00) (89 - 108)  BP: 120/65 (05 Oct 2024 05:00) (120/65 - 137/89)  BP(mean): --  RR: 18 (05 Oct 2024 05:00) (17 - 18)  SpO2: 98% (05 Oct 2024 05:00) (98% - 100%)    Parameters below as of 05 Oct 2024 05:00  Patient On (Oxygen Delivery Method): room air      Daily     Daily     GENERAL:  No acute distress  HEENT:  NCAT, no scleral icterus  CHEST: no resp distress  HEART:  RRR  ABDOMEN:  Soft, non-tender, non-distended  EXTREMITIES:  No cyanosis, clubbing, or edema  NEURO:  Alert and oriented x 3    LABS:                        13.9   22.21 )-----------( 476      ( 05 Oct 2024 06:22 )             41.4     Mean Cell Volume: 81.8 fL (10-05-24 @ 06:22)    10-05    134[L]  |  99  |  8   ----------------------------<  109[H]  3.8   |  23  |  0.79    Ca    8.5      05 Oct 2024 06:22  Phos  3.7     10-05  Mg     2.20     10-05          Urinalysis Basic - ( 05 Oct 2024 06:22 )    Color: x / Appearance: x / SG: x / pH: x  Gluc: 109 mg/dL / Ketone: x  / Bili: x / Urobili: x   Blood: x / Protein: x / Nitrite: x   Leuk Esterase: x / RBC: x / WBC x   Sq Epi: x / Non Sq Epi: x / Bacteria: x            Imaging:

## 2024-10-08 NOTE — PROGRESS NOTE ADULT - SUBJECTIVE AND OBJECTIVE BOX
Patient is a 34y old  Male who presents with a chief complaint of UC flare w/ bloody diarrhea (08 Oct 2024 13:06)      SUBJECTIVE / OVERNIGHT EVENTS: Pt seen and examined at 12:45pm, no overnight events, pt reports that he had 5 bm since last night, less blood and less liquid, no abdominal pain, says that he was told that he will need colectomy and will need to go [place], no other complaints.    MEDICATIONS  (STANDING):  enoxaparin Injectable 40 milliGRAM(s) SubCutaneous every 24 hours  influenza   Vaccine 0.5 milliLiter(s) IntraMuscular once  methylPREDNISolone sodium succinate Injectable 20 milliGRAM(s) IV Push every 8 hours  pantoprazole    Tablet 40 milliGRAM(s) Oral before breakfast  sodium chloride 0.9%. 1000 milliLiter(s) (100 mL/Hr) IV Continuous <Continuous>    MEDICATIONS  (PRN):  melatonin 3 milliGRAM(s) Oral at bedtime PRN Insomnia      Vital Signs Last 24 Hrs  T(C): 36.7 (08 Oct 2024 10:47), Max: 36.8 (08 Oct 2024 04:50)  T(F): 98.1 (08 Oct 2024 10:47), Max: 98.3 (08 Oct 2024 04:50)  HR: 106 (08 Oct 2024 10:47) (91 - 106)  BP: 130/82 (08 Oct 2024 10:47) (124/72 - 132/86)  BP(mean): --  RR: 18 (08 Oct 2024 10:47) (18 - 18)  SpO2: 98% (08 Oct 2024 10:47) (98% - 99%)    Parameters below as of 08 Oct 2024 10:47  Patient On (Oxygen Delivery Method): room air      CAPILLARY BLOOD GLUCOSE        I&O's Summary      PHYSICAL EXAM:  GENERAL: NAD  CHEST/LUNG: Clear to auscultation bilaterally; No wheeze  HEART: Regular rate and rhythm  ABDOMEN: Soft, Nontender, Nondistended  EXTREMITIES: no LE edema  PSYCH: Calm  NEUROLOGY: AAOx3  SKIN: dry and warm    LABS:                        14.3   29.60 )-----------( 480      ( 08 Oct 2024 05:30 )             43.0     10-08    135  |  101  |  8   ----------------------------<  122[H]  4.2   |  25  |  0.71    Ca    8.6      08 Oct 2024 05:30  Phos  3.6     10-08  Mg     2.20     10-08            Urinalysis Basic - ( 08 Oct 2024 05:30 )    Color: x / Appearance: x / SG: x / pH: x  Gluc: 122 mg/dL / Ketone: x  / Bili: x / Urobili: x   Blood: x / Protein: x / Nitrite: x   Leuk Esterase: x / RBC: x / WBC x   Sq Epi: x / Non Sq Epi: x / Bacteria: x        RADIOLOGY & ADDITIONAL TESTS:    Imaging Personally Reviewed:    Consultant(s) Notes Reviewed:      Care Discussed with Consultants/Other Providers:

## 2024-10-08 NOTE — PROGRESS NOTE ADULT - SUBJECTIVE AND OBJECTIVE BOX
GENERAL SURGERY PROGRESS NOTE    Patient: RADHA BUCIO , 34y ([birth year])Male   MRN: [id number]  Location: [place] [address] A  Visit: 09-26-24 Inpatient  Date: 10-08-24 @ 14:23    Subjective: No acute events overnight. Patient resting comfortably in bed, no complaints.     PAST MEDICAL & SURGICAL HISTORY:  Ulcerative colitis      No significant past surgical history          Vitals: T(F): 98.1 (10-08-24 @ 10:47), Max: 98.3 (10-08-24 @ 04:50)  HR: 106 (10-08-24 @ 10:47)  BP: 130/82 (10-08-24 @ 10:47)  RR: 18 (10-08-24 @ 10:47)  SpO2: 98% (10-08-24 @ 10:47)      Diet, Regular:   Fiber/Residue Restricted (LOWFIBER)  No Concentrated Phosphorus  Halal      PHYSICAL EXAM  GEN: No acute distress   CV: RRR, no JVD  LUNGS: Respirations unlabored, no use of accessory muscles  ABD: Abdomen soft, ND, NT   EXT: No peripheral edema. Regular range of motion.     MEDICATIONS  (STANDING):  enoxaparin Injectable 40 milliGRAM(s) SubCutaneous every 24 hours  influenza   Vaccine 0.5 milliLiter(s) IntraMuscular once  methylPREDNISolone sodium succinate Injectable 20 milliGRAM(s) IV Push every 8 hours  pantoprazole    Tablet 40 milliGRAM(s) Oral before breakfast  sodium chloride 0.9%. 1000 milliLiter(s) (100 mL/Hr) IV Continuous <Continuous>    MEDICATIONS  (PRN):  melatonin 3 milliGRAM(s) Oral at bedtime PRN Insomnia      DVT PROPHYLAXIS: SCDs, enoxaparin Injectable 40 milliGRAM(s) SubCutaneous every 24 hours    GI PROPHYLAXIS: pantoprazole    Tablet 40 milliGRAM(s) Oral before breakfast    ANTICOAGULATION:   ANTIBIOTICS:       LAB/STUDIES:  CAPILLARY BLOOD GLUCOSE                              14.3   29.60 )-----------( 480      ( 08 Oct 2024 05:30 )             43.0     10-08    135  |  101  |  8   ----------------------------<  122[H]  4.2   |  25  |  0.71    Ca    8.6      08 Oct 2024 05:30  Phos  3.6     10-08  Mg     2.20     10-08          Urinalysis Basic - ( 08 Oct 2024 05:30 )    Color: x / Appearance: x / SG: x / pH: x  Gluc: 122 mg/dL / Ketone: x  / Bili: x / Urobili: x   Blood: x / Protein: x / Nitrite: x   Leuk Esterase: x / RBC: x / WBC x   Sq Epi: x / Non Sq Epi: x / Bacteria: x

## 2024-10-08 NOTE — PROGRESS NOTE ADULT - PROBLEM SELECTOR PLAN 3
Diet: regular  DVT: started as per GI   Dispo: Pending GI and sx decision about sx    Plan discussed with ACP

## 2024-10-09 PROBLEM — K51.90 ULCERATIVE COLITIS, UNSPECIFIED, WITHOUT COMPLICATIONS: Chronic | Status: ACTIVE | Noted: 2024-09-27

## 2024-10-09 LAB
ANION GAP SERPL CALC-SCNC: 10 MMOL/L — SIGNIFICANT CHANGE UP (ref 7–14)
BUN SERPL-MCNC: 9 MG/DL — SIGNIFICANT CHANGE UP (ref 7–23)
CALCIUM SERPL-MCNC: 8.3 MG/DL — LOW (ref 8.4–10.5)
CHLORIDE SERPL-SCNC: 99 MMOL/L — SIGNIFICANT CHANGE UP (ref 98–107)
CO2 SERPL-SCNC: 25 MMOL/L — SIGNIFICANT CHANGE UP (ref 22–31)
CREAT SERPL-MCNC: 0.73 MG/DL — SIGNIFICANT CHANGE UP (ref 0.5–1.3)
CRP SERPL-MCNC: 17.1 MG/L — HIGH
EGFR: 124 ML/MIN/1.73M2 — SIGNIFICANT CHANGE UP
GLUCOSE SERPL-MCNC: 108 MG/DL — HIGH (ref 70–99)
HCT VFR BLD CALC: 39.6 % — SIGNIFICANT CHANGE UP (ref 39–50)
HGB BLD-MCNC: 13.5 G/DL — SIGNIFICANT CHANGE UP (ref 13–17)
MAGNESIUM SERPL-MCNC: 2.1 MG/DL — SIGNIFICANT CHANGE UP (ref 1.6–2.6)
MCHC RBC-ENTMCNC: 27.5 PG — SIGNIFICANT CHANGE UP (ref 27–34)
MCHC RBC-ENTMCNC: 34.1 GM/DL — SIGNIFICANT CHANGE UP (ref 32–36)
MCV RBC AUTO: 80.7 FL — SIGNIFICANT CHANGE UP (ref 80–100)
NRBC # BLD: 0 /100 WBCS — SIGNIFICANT CHANGE UP (ref 0–0)
NRBC # FLD: 0 K/UL — SIGNIFICANT CHANGE UP (ref 0–0)
PHOSPHATE SERPL-MCNC: 3.6 MG/DL — SIGNIFICANT CHANGE UP (ref 2.5–4.5)
PLATELET # BLD AUTO: 471 K/UL — HIGH (ref 150–400)
POTASSIUM SERPL-MCNC: 4 MMOL/L — SIGNIFICANT CHANGE UP (ref 3.5–5.3)
POTASSIUM SERPL-SCNC: 4 MMOL/L — SIGNIFICANT CHANGE UP (ref 3.5–5.3)
RBC # BLD: 4.91 M/UL — SIGNIFICANT CHANGE UP (ref 4.2–5.8)
RBC # FLD: 12.8 % — SIGNIFICANT CHANGE UP (ref 10.3–14.5)
SODIUM SERPL-SCNC: 134 MMOL/L — LOW (ref 135–145)
SURGICAL PATHOLOGY STUDY: SIGNIFICANT CHANGE UP
WBC # BLD: 31.36 K/UL — HIGH (ref 3.8–10.5)
WBC # FLD AUTO: 31.36 K/UL — HIGH (ref 3.8–10.5)

## 2024-10-09 PROCEDURE — 99232 SBSQ HOSP IP/OBS MODERATE 35: CPT

## 2024-10-09 RX ORDER — 5-HYDROXYTRYPTOPHAN (5-HTP) 100 MG
1 TABLET,DISINTEGRATING ORAL
Qty: 0 | Refills: 0 | DISCHARGE
Start: 2024-10-09

## 2024-10-09 RX ORDER — FIDAXOMICIN 200 MG/5ML
1 GRANULE, FOR SUSPENSION ORAL
Refills: 0 | DISCHARGE

## 2024-10-09 RX ADMIN — METHYLPREDNISOLONE ACETATE 20 MILLIGRAM(S): 80 INJECTION, SUSPENSION INTRA-ARTICULAR; INTRALESIONAL; INTRAMUSCULAR; SOFT TISSUE at 17:48

## 2024-10-09 RX ADMIN — PANTOPRAZOLE SODIUM 40 MILLIGRAM(S): 40 TABLET, DELAYED RELEASE ORAL at 05:04

## 2024-10-09 RX ADMIN — METHYLPREDNISOLONE ACETATE 20 MILLIGRAM(S): 80 INJECTION, SUSPENSION INTRA-ARTICULAR; INTRALESIONAL; INTRAMUSCULAR; SOFT TISSUE at 10:40

## 2024-10-09 RX ADMIN — METHYLPREDNISOLONE ACETATE 20 MILLIGRAM(S): 80 INJECTION, SUSPENSION INTRA-ARTICULAR; INTRALESIONAL; INTRAMUSCULAR; SOFT TISSUE at 01:38

## 2024-10-09 NOTE — PROGRESS NOTE ADULT - SUBJECTIVE AND OBJECTIVE BOX
GI progress note    Chief Complaint:  Patient is a 34y old  Male who presents with a chief complaint of UC flare w/ bloody diarrhea.     Interval Events:   - CRP and WBC uptrending; patient denies abdominal pain and had 8 BMs in last 24 hrs but more watery than before  - patient more amenable to surgery    Allergies:  No Known Allergies  mesalamine (Other (Mod to Severe))    Hospital Medications:  enoxaparin Injectable 40 milliGRAM(s) SubCutaneous every 24 hours  inFLIXimab-dyyb (INFLECTRA) IVPB 500 milliGRAM(s) IV Intermittent once  influenza   Vaccine 0.5 milliLiter(s) IntraMuscular once  methylPREDNISolone sodium succinate Injectable 20 milliGRAM(s) IV Push every 8 hours  pantoprazole    Tablet 40 milliGRAM(s) Oral before breakfast  sodium chloride 0.9%. 1000 milliLiter(s) IV Continuous <Continuous>        PHYSICAL EXAM:   Vital Signs:  Vital Signs Last 24 Hrs  T(C): 36.7 (05 Oct 2024 05:00), Max: 36.8 (04 Oct 2024 18:31)  T(F): 98 (05 Oct 2024 05:00), Max: 98.2 (04 Oct 2024 18:31)  HR: 89 (05 Oct 2024 05:00) (89 - 108)  BP: 120/65 (05 Oct 2024 05:00) (120/65 - 137/89)  BP(mean): --  RR: 18 (05 Oct 2024 05:00) (17 - 18)  SpO2: 98% (05 Oct 2024 05:00) (98% - 100%)    Parameters below as of 05 Oct 2024 05:00  Patient On (Oxygen Delivery Method): room air      Daily     Daily     GENERAL:  No acute distress  HEENT:  NCAT, no scleral icterus  CHEST: no resp distress  HEART:  RRR  ABDOMEN:  Soft, non-tender, non-distended  EXTREMITIES:  No cyanosis, clubbing, or edema  NEURO:  Alert and oriented x 3    LABS:                        13.9   22.21 )-----------( 476      ( 05 Oct 2024 06:22 )             41.4     Mean Cell Volume: 81.8 fL (10-05-24 @ 06:22)    10-05    134[L]  |  99  |  8   ----------------------------<  109[H]  3.8   |  23  |  0.79    Ca    8.5      05 Oct 2024 06:22  Phos  3.7     10-05  Mg     2.20     10-05          Urinalysis Basic - ( 05 Oct 2024 06:22 )    Color: x / Appearance: x / SG: x / pH: x  Gluc: 109 mg/dL / Ketone: x  / Bili: x / Urobili: x   Blood: x / Protein: x / Nitrite: x   Leuk Esterase: x / RBC: x / WBC x   Sq Epi: x / Non Sq Epi: x / Bacteria: x            Imaging:           GI progress note    Chief Complaint:  Patient is a 34y old  Male who presents with a chief complaint of UC flare w/ bloody diarrhea.     Interval Events:   - CRP and WBC uptrending; patient denies abdominal pain and had 8 BMs in last 24 hrs but more watery than before  - patient more amenable to surgery  - path from 10/3 showing severe colitis in right, left, and rectum bx w/ histological features suggestive of IBD; negative for dysplasia and CMV stain pending    Allergies:  No Known Allergies  mesalamine (Other (Mod to Severe))    Hospital Medications:  enoxaparin Injectable 40 milliGRAM(s) SubCutaneous every 24 hours  inFLIXimab-dyyb (INFLECTRA) IVPB 500 milliGRAM(s) IV Intermittent once  influenza   Vaccine 0.5 milliLiter(s) IntraMuscular once  methylPREDNISolone sodium succinate Injectable 20 milliGRAM(s) IV Push every 8 hours  pantoprazole    Tablet 40 milliGRAM(s) Oral before breakfast  sodium chloride 0.9%. 1000 milliLiter(s) IV Continuous <Continuous>        PHYSICAL EXAM:   Vital Signs:  Vital Signs Last 24 Hrs  T(C): 36.7 (05 Oct 2024 05:00), Max: 36.8 (04 Oct 2024 18:31)  T(F): 98 (05 Oct 2024 05:00), Max: 98.2 (04 Oct 2024 18:31)  HR: 89 (05 Oct 2024 05:00) (89 - 108)  BP: 120/65 (05 Oct 2024 05:00) (120/65 - 137/89)  BP(mean): --  RR: 18 (05 Oct 2024 05:00) (17 - 18)  SpO2: 98% (05 Oct 2024 05:00) (98% - 100%)    Parameters below as of 05 Oct 2024 05:00  Patient On (Oxygen Delivery Method): room air      Daily     Daily     GENERAL:  No acute distress  HEENT:  NCAT, no scleral icterus  CHEST: no resp distress  HEART:  RRR  ABDOMEN:  Soft, non-tender, non-distended  EXTREMITIES:  No cyanosis, clubbing, or edema  NEURO:  Alert and oriented x 3    LABS:                        13.9   22.21 )-----------( 476      ( 05 Oct 2024 06:22 )             41.4     Mean Cell Volume: 81.8 fL (10-05-24 @ 06:22)    10-05    134[L]  |  99  |  8   ----------------------------<  109[H]  3.8   |  23  |  0.79    Ca    8.5      05 Oct 2024 06:22  Phos  3.7     10-05  Mg     2.20     10-05          Urinalysis Basic - ( 05 Oct 2024 06:22 )    Color: x / Appearance: x / SG: x / pH: x  Gluc: 109 mg/dL / Ketone: x  / Bili: x / Urobili: x   Blood: x / Protein: x / Nitrite: x   Leuk Esterase: x / RBC: x / WBC x   Sq Epi: x / Non Sq Epi: x / Bacteria: x            Imaging:

## 2024-10-09 NOTE — PROGRESS NOTE ADULT - ASSESSMENT
35 y/o M with pmhx of UC comes to the ED with UC flare and C Diff. On admission, labs significant for elevated WBC and CT A/P showing UC flare. Patient follows with GI (Abran) at [facility], came here due to living close to hospital. Patient has been on Entyvio but was recently switched to [facility] outpatient. Inpatient, he was treated for C. Diff and started on steroids. He has had persistent bloody diarrhea through steroids so Remicade was started 10/3. Patient also received flex sig 10/3. Surgery consulted to establish care. Some symptomatic improvement on Remicaide, however worsening WBC and CRP.     Plan:  - Continue pain management  - Remicaide per GI   - Patient more amenable to surgery at this time   - If symptoms/labs don't improve today (10/9), plan for transfer Thursday to [place] for colectomy Friday with Dr. Cassandra DEL CID-Team  [id number]

## 2024-10-09 NOTE — PROGRESS NOTE ADULT - PROBLEM SELECTOR PLAN 3
Diet: regular  DVT: started as per GI   Dispo: to [facility] tomorrow    Plan discussed with ACP

## 2024-10-09 NOTE — PROGRESS NOTE ADULT - PROBLEM SELECTOR PLAN 2
- positive Cdiff as outpatient, was initiated on fidaxomicin for 10d course - completed  - ID recs appreciated   - monitor electrolytes  - contact precautions - unable to remove per infection control due to ongoing symptoms (though may be attributable to UC flare rather than Cdiff)  - per ID, no utility in extending fidaxomicin course Unna Boot Text: An Unna boot was placed to help immobilize the limb and facilitate more rapid healing.

## 2024-10-09 NOTE — PROGRESS NOTE ADULT - PROBLEM SELECTOR PLAN 1
- ongoing flare for 3 months notable for episodes of bloody diarrhea  - routinely follows with GI Dr Edwin Osullivan at [facility]; previously on Entyvio and recently switched to Stelara (s/p loading dose, 1 maintenance dose)  - currently receiving IV steroids (methylprednisolone 20mg q8h) - PPI ppx and glucose monitoring  - s/p flex-sig 10/3 - biopsy taken, severe UC noted  - s/p infliximab x2  -noted rise in WBC, apprec GI involvement, concerned with rising labs, colectomy now being discussed, pt is agreeable for sx  - Discussed with sx attending, plan is for him to be transferred to [facility] on Thursday and sx planned for Friday

## 2024-10-09 NOTE — PROGRESS NOTE ADULT - ASSESSMENT
34M PMH ulcerative colitis following with Dr. Osullivan of [facility] presents with 8-10 episodes of bloody diarrhea during the day with further episodes of bloody diarrhea at night, advised to come in to hospital for treatment of UC flare.

## 2024-10-09 NOTE — PROGRESS NOTE ADULT - SUBJECTIVE AND OBJECTIVE BOX
Patient is a 34y old  Male who presents with a chief complaint of UC flare w/ bloody diarrhea (09 Oct 2024 10:29)      SUBJECTIVE / OVERNIGHT EVENTS: Pt seen and examined at 12:03pm, no overnight events, pt reports that he had 6 bm over 24hrs, with less blood but liquid to soft stool, no abdominal pain, is willing for sx, no other complaints.      MEDICATIONS  (STANDING):  enoxaparin Injectable 40 milliGRAM(s) SubCutaneous every 24 hours  influenza   Vaccine 0.5 milliLiter(s) IntraMuscular once  methylPREDNISolone sodium succinate Injectable 20 milliGRAM(s) IV Push every 8 hours  pantoprazole    Tablet 40 milliGRAM(s) Oral before breakfast  sodium chloride 0.9%. 1000 milliLiter(s) (100 mL/Hr) IV Continuous <Continuous>    MEDICATIONS  (PRN):  melatonin 3 milliGRAM(s) Oral at bedtime PRN Insomnia      Vital Signs Last 24 Hrs  T(C): 36.6 (09 Oct 2024 04:50), Max: 37.3 (08 Oct 2024 18:08)  T(F): 97.8 (09 Oct 2024 04:50), Max: 99.1 (08 Oct 2024 18:08)  HR: 94 (09 Oct 2024 04:50) (94 - 103)  BP: 138/88 (09 Oct 2024 04:50) (116/83 - 138/88)  BP(mean): --  RR: 18 (09 Oct 2024 04:50) (18 - 18)  SpO2: 97% (09 Oct 2024 04:50) (95% - 98%)    Parameters below as of 09 Oct 2024 04:50  Patient On (Oxygen Delivery Method): room air      CAPILLARY BLOOD GLUCOSE        I&O's Summary      PHYSICAL EXAM:  GENERAL: NAD  CHEST/LUNG: Clear to auscultation bilaterally; No wheeze  HEART: Regular rate and rhythm  ABDOMEN: Soft, Nontender, Nondistended  EXTREMITIES: no LE edema  PSYCH: Calm  NEUROLOGY: AAOx3  SKIN: dry and warm    LABS:                        13.5   31.36 )-----------( 471      ( 09 Oct 2024 05:15 )             39.6     10-09    134[L]  |  99  |  9   ----------------------------<  108[H]  4.0   |  25  |  0.73    Ca    8.3[L]      09 Oct 2024 05:15  Phos  3.6     10-09  Mg     2.10     10-09            Urinalysis Basic - ( 09 Oct 2024 05:15 )    Color: x / Appearance: x / SG: x / pH: x  Gluc: 108 mg/dL / Ketone: x  / Bili: x / Urobili: x   Blood: x / Protein: x / Nitrite: x   Leuk Esterase: x / RBC: x / WBC x   Sq Epi: x / Non Sq Epi: x / Bacteria: x        RADIOLOGY & ADDITIONAL TESTS:    Imaging Personally Reviewed:    Consultant(s) Notes Reviewed:      Care Discussed with Consultants/Other Providers:

## 2024-10-09 NOTE — PROGRESS NOTE ADULT - ASSESSMENT
33 yo M with pmhx of UC comes to the ED after experiencing worsening diarrhea for the past one month with outpatient stool studies positive for C Diff and initiation of fidaxomicin. On admission, labs significant for elevated WBC and CT A/P showing UC flare. GI consulted for further management    Impression:  #UC flare  #C diff+  #bloody diarrhea   - patient with hx of UC and trialed on multiple biologics and steroid taper  - also found to be C diff + outpatient   - overall would favor presentation to be UC flare due to bloody diarrhea, as C Diff does not usually cause hematochezia  - HBV serologies neg for HBV  - per [facility], quant gold neg on 7/10/24  - stool Cx neg  - s/p remicade IV 500mg x 1 on 10/2 and then second dose of 500mg on 10/5  - s/p colonoscopy on 10/3/24, severe UC pancolitis w/ Leahy score 3. S/p biopsies for disease activity and CMV.  - fecal calprotectin 3740  - today, patient with more watery BMs with uptrending WBC and CRP. Patient is having an inadequate response to remicade and is indicated for surgical intervention. Colorectal surgery already onboard and planning on tx to [facility] on Thursday and colectomy on Friday.     Recommendations:  - c/w solumedrol IV 20mg q8hrs for now  - appreciate colorectal surgery recs re colectomy timing; per surgery note, tx to [facility] tomorrow and colectomy on Friday.   - serial abdominal exam; if becomes febrile, more tachycardic, worsening CRP/leukocytosis, and new abdominal exam findings, would obtain CT and stat page colorectal surgery  - s/p 10 day course of fidaxomicin  - monitor stool counts  - please trend CBC, CMP, and CRP daily  - low residue diet  - c/w DVT ppx as patients with UC flare are high risk for VTE  - avoid opioids, anti-cholinergics, and anti-diarrheals as these can predispose to toxic megacolon  - avoid NSAIDs     Note incomplete until finalized by attending signature/attestation.    Bentley Nichols, PGY-4  Gastroenterology & Hepatology Fellow  Available on TEAMS  Long range pager #: [phone]  Short range pager#: [phone]    For non-urgent consults, please send email to [email or web address] (for [facility]) or [email or web address] (for RUEL[facility])   33 yo M with pmhx of UC comes to the ED after experiencing worsening diarrhea for the past one month with outpatient stool studies positive for C Diff and initiation of fidaxomicin. On admission, labs significant for elevated WBC and CT A/P showing UC flare. GI consulted for further management    Impression:  #UC flare  #C diff+  #bloody diarrhea   - patient with hx of UC and trialed on multiple biologics and steroid taper  - also found to be C diff + outpatient   - overall would favor presentation to be UC flare due to bloody diarrhea, as C Diff does not usually cause hematochezia  - HBV serologies neg for HBV  - per [facility], quant gold neg on 7/10/24  - stool Cx neg  - s/p remicade IV 500mg x 1 on 10/2 and then second dose of 500mg on 10/5  - s/p colonoscopy on 10/3/24, severe UC pancolitis w/ Leahy score 3. S/p biopsies for disease activity and CMV.  - fecal calprotectin 3740  - path from 10/3 colonoscopy showing severe colitis in right, left, and rectum bx w/ histological features suggestive of IBD; negative for dysplasia and CMV stain pending  - today, patient with more watery BMs with uptrending WBC and CRP. Patient is having an inadequate response to remicade and is indicated for surgical intervention. Colorectal surgery already onboard and planning on tx to [facility] on Thursday and colectomy on Friday.     Recommendations:  - c/w solumedrol IV 20mg q8hrs for now  - appreciate colorectal surgery recs re colectomy timing; per surgery note, tx to [facility] tomorrow and colectomy on Friday.   - serial abdominal exam; if becomes febrile, more tachycardic, worsening CRP/leukocytosis, and new abdominal exam findings, would obtain CT and stat page colorectal surgery  - s/p 10 day course of fidaxomicin  - monitor stool counts  - please trend CBC, CMP, and CRP daily  - low residue diet  - c/w DVT ppx as patients with UC flare are high risk for VTE  - avoid opioids, anti-cholinergics, and anti-diarrheals as these can predispose to toxic megacolon  - avoid NSAIDs     Note incomplete until finalized by attending signature/attestation.    Bentley Nichols, PGY-4  Gastroenterology & Hepatology Fellow  Available on TEAMS  Long range pager #: [phone]  Short range pager#: [phone]    For non-urgent consults, please send email to [email or web address] (for [facility]) or [email or web address] (for [email or web address])

## 2024-10-09 NOTE — PROGRESS NOTE ADULT - SUBJECTIVE AND OBJECTIVE BOX
GENERAL SURGERY PROGRESS NOTE    Patient: RADHA BUCIO , 34y ([birth year])Male   MRN: [id number]  Location: [place] [address] A  Visit: 09-26-24 Inpatient  Date: 10-09-24 @ 05:50    Subjective: No acute events overnight. Patient resting comfortably in bed, no complaints.     PAST MEDICAL & SURGICAL HISTORY:  Ulcerative colitis      No significant past surgical history          Vitals: T(F): 98.5 (10-08-24 @ 21:48), Max: 99.1 (10-08-24 @ 18:08)  HR: 103 (10-08-24 @ 21:48)  BP: 124/86 (10-08-24 @ 21:48)  RR: 18 (10-08-24 @ 21:48)  SpO2: 95% (10-08-24 @ 21:48)      Diet, Regular:   Fiber/Residue Restricted (LOWFIBER)  No Concentrated Phosphorus  Halal    PHYSICAL EXAM  GEN: No acute distress   CV: RRR, no JVD  LUNGS: Respirations unlabored, no use of accessory muscles  ABD: Abdomen soft, ND, NT   EXT: No peripheral edema. Regular range of motion.     MEDICATIONS  (STANDING):  enoxaparin Injectable 40 milliGRAM(s) SubCutaneous every 24 hours  influenza   Vaccine 0.5 milliLiter(s) IntraMuscular once  methylPREDNISolone sodium succinate Injectable 20 milliGRAM(s) IV Push every 8 hours  pantoprazole    Tablet 40 milliGRAM(s) Oral before breakfast  sodium chloride 0.9%. 1000 milliLiter(s) (100 mL/Hr) IV Continuous <Continuous>    MEDICATIONS  (PRN):  melatonin 3 milliGRAM(s) Oral at bedtime PRN Insomnia      DVT PROPHYLAXIS: SCDs, enoxaparin Injectable 40 milliGRAM(s) SubCutaneous every 24 hours    GI PROPHYLAXIS: pantoprazole    Tablet 40 milliGRAM(s) Oral before breakfast    ANTICOAGULATION:   ANTIBIOTICS:       LAB/STUDIES:  CAPILLARY BLOOD GLUCOSE                              14.3   29.60 )-----------( 480      ( 08 Oct 2024 05:30 )             43.0     10-08    135  |  101  |  8   ----------------------------<  122[H]  4.2   |  25  |  0.71    Ca    8.6      08 Oct 2024 05:30  Phos  3.6     10-08  Mg     2.20     10-08          Urinalysis Basic - ( 08 Oct 2024 05:30 )    Color: x / Appearance: x / SG: x / pH: x  Gluc: 122 mg/dL / Ketone: x  / Bili: x / Urobili: x   Blood: x / Protein: x / Nitrite: x   Leuk Esterase: x / RBC: x / WBC x   Sq Epi: x / Non Sq Epi: x / Bacteria: x

## 2024-10-10 ENCOUNTER — INPATIENT (INPATIENT)
Facility: HOSPITAL | Age: 34
LOS: 3 days | Discharge: HOME HEALTH | DRG: 387 | End: 2024-10-14
Attending: COLON & RECTAL SURGERY | Admitting: COLON & RECTAL SURGERY
Payer: COMMERCIAL

## 2024-10-10 VITALS
RESPIRATION RATE: 17 BRPM | HEART RATE: 97 BPM | TEMPERATURE: 98 F | DIASTOLIC BLOOD PRESSURE: 80 MMHG | SYSTOLIC BLOOD PRESSURE: 124 MMHG | OXYGEN SATURATION: 99 %

## 2024-10-10 VITALS
SYSTOLIC BLOOD PRESSURE: 131 MMHG | RESPIRATION RATE: 18 BRPM | TEMPERATURE: 98 F | OXYGEN SATURATION: 97 % | HEART RATE: 111 BPM | DIASTOLIC BLOOD PRESSURE: 84 MMHG

## 2024-10-10 DIAGNOSIS — K51.911 ULCERATIVE COLITIS, UNSPECIFIED WITH RECTAL BLEEDING: ICD-10-CM

## 2024-10-10 LAB
ANION GAP SERPL CALC-SCNC: 10 MMOL/L — SIGNIFICANT CHANGE UP (ref 7–14)
BUN SERPL-MCNC: 8 MG/DL — SIGNIFICANT CHANGE UP (ref 7–23)
CALCIUM SERPL-MCNC: 8.4 MG/DL — SIGNIFICANT CHANGE UP (ref 8.4–10.5)
CHLORIDE SERPL-SCNC: 99 MMOL/L — SIGNIFICANT CHANGE UP (ref 98–107)
CO2 SERPL-SCNC: 26 MMOL/L — SIGNIFICANT CHANGE UP (ref 22–31)
CREAT SERPL-MCNC: 0.84 MG/DL — SIGNIFICANT CHANGE UP (ref 0.5–1.3)
CRP SERPL-MCNC: 20.8 MG/L — HIGH
EGFR: 119 ML/MIN/1.73M2 — SIGNIFICANT CHANGE UP
GLUCOSE SERPL-MCNC: 111 MG/DL — HIGH (ref 70–99)
HCT VFR BLD CALC: 42.1 % — SIGNIFICANT CHANGE UP (ref 39–50)
HGB BLD-MCNC: 13.9 G/DL — SIGNIFICANT CHANGE UP (ref 13–17)
MAGNESIUM SERPL-MCNC: 2.1 MG/DL — SIGNIFICANT CHANGE UP (ref 1.6–2.6)
MCHC RBC-ENTMCNC: 27.3 PG — SIGNIFICANT CHANGE UP (ref 27–34)
MCHC RBC-ENTMCNC: 33 GM/DL — SIGNIFICANT CHANGE UP (ref 32–36)
MCV RBC AUTO: 82.5 FL — SIGNIFICANT CHANGE UP (ref 80–100)
NRBC # BLD: 0 /100 WBCS — SIGNIFICANT CHANGE UP (ref 0–0)
NRBC # FLD: 0 K/UL — SIGNIFICANT CHANGE UP (ref 0–0)
PHOSPHATE SERPL-MCNC: 4.7 MG/DL — HIGH (ref 2.5–4.5)
PLATELET # BLD AUTO: 453 K/UL — HIGH (ref 150–400)
POTASSIUM SERPL-MCNC: 4.1 MMOL/L — SIGNIFICANT CHANGE UP (ref 3.5–5.3)
POTASSIUM SERPL-SCNC: 4.1 MMOL/L — SIGNIFICANT CHANGE UP (ref 3.5–5.3)
RBC # BLD: 5.1 M/UL — SIGNIFICANT CHANGE UP (ref 4.2–5.8)
RBC # FLD: 13.1 % — SIGNIFICANT CHANGE UP (ref 10.3–14.5)
SODIUM SERPL-SCNC: 135 MMOL/L — SIGNIFICANT CHANGE UP (ref 135–145)
WBC # BLD: 29.76 K/UL — HIGH (ref 3.8–10.5)
WBC # FLD AUTO: 29.76 K/UL — HIGH (ref 3.8–10.5)

## 2024-10-10 PROCEDURE — 93010 ELECTROCARDIOGRAM REPORT: CPT

## 2024-10-10 PROCEDURE — 99232 SBSQ HOSP IP/OBS MODERATE 35: CPT | Mod: GC

## 2024-10-10 PROCEDURE — 99239 HOSP IP/OBS DSCHRG MGMT >30: CPT

## 2024-10-10 RX ORDER — PANTOPRAZOLE SODIUM 40 MG/1
40 TABLET, DELAYED RELEASE ORAL EVERY 24 HOURS
Refills: 0 | Status: DISCONTINUED | OUTPATIENT
Start: 2024-10-10 | End: 2024-10-11

## 2024-10-10 RX ORDER — METHYLPREDNISOLONE ACETATE 80 MG/ML
20 INJECTION, SUSPENSION INTRA-ARTICULAR; INTRALESIONAL; INTRAMUSCULAR; SOFT TISSUE EVERY 8 HOURS
Refills: 0 | Status: DISCONTINUED | OUTPATIENT
Start: 2024-10-10 | End: 2024-10-11

## 2024-10-10 RX ORDER — OXYCODONE HYDROCHLORIDE 30 MG/1
2.5 TABLET, FILM COATED, EXTENDED RELEASE ORAL EVERY 8 HOURS
Refills: 0 | Status: DISCONTINUED | OUTPATIENT
Start: 2024-10-10 | End: 2024-10-11

## 2024-10-10 RX ORDER — ENOXAPARIN SODIUM 150 MG/ML
40 INJECTION SUBCUTANEOUS EVERY 24 HOURS
Refills: 0 | Status: DISCONTINUED | OUTPATIENT
Start: 2024-10-10 | End: 2024-10-11

## 2024-10-10 RX ORDER — PANTOPRAZOLE SODIUM 40 MG/1
40 TABLET, DELAYED RELEASE ORAL
Refills: 0 | Status: DISCONTINUED | OUTPATIENT
Start: 2024-10-10 | End: 2024-10-10

## 2024-10-10 RX ORDER — ACETAMINOPHEN 325 MG
975 TABLET ORAL EVERY 6 HOURS
Refills: 0 | Status: DISCONTINUED | OUTPATIENT
Start: 2024-10-10 | End: 2024-10-11

## 2024-10-10 RX ORDER — ENOXAPARIN SODIUM 150 MG/ML
40 INJECTION SUBCUTANEOUS EVERY 24 HOURS
Refills: 0 | Status: DISCONTINUED | OUTPATIENT
Start: 2024-10-10 | End: 2024-10-10

## 2024-10-10 RX ORDER — SODIUM CHLORIDE IRRIG SOLUTION 0.9 %
1000 SOLUTION, IRRIGATION IRRIGATION
Refills: 0 | Status: DISCONTINUED | OUTPATIENT
Start: 2024-10-11 | End: 2024-10-11

## 2024-10-10 RX ADMIN — METHYLPREDNISOLONE ACETATE 20 MILLIGRAM(S): 80 INJECTION, SUSPENSION INTRA-ARTICULAR; INTRALESIONAL; INTRAMUSCULAR; SOFT TISSUE at 18:18

## 2024-10-10 RX ADMIN — PANTOPRAZOLE SODIUM 40 MILLIGRAM(S): 40 TABLET, DELAYED RELEASE ORAL at 18:18

## 2024-10-10 RX ADMIN — METHYLPREDNISOLONE ACETATE 20 MILLIGRAM(S): 80 INJECTION, SUSPENSION INTRA-ARTICULAR; INTRALESIONAL; INTRAMUSCULAR; SOFT TISSUE at 10:13

## 2024-10-10 RX ADMIN — PANTOPRAZOLE SODIUM 40 MILLIGRAM(S): 40 TABLET, DELAYED RELEASE ORAL at 05:25

## 2024-10-10 RX ADMIN — METHYLPREDNISOLONE ACETATE 20 MILLIGRAM(S): 80 INJECTION, SUSPENSION INTRA-ARTICULAR; INTRALESIONAL; INTRAMUSCULAR; SOFT TISSUE at 02:05

## 2024-10-10 NOTE — H&P ADULT - NSHPPHYSICALEXAM_GEN_ALL_CORE
General- Well nourished young man, non toxic appearing. Non cachectic  HEENT- Normocephalic, atraumatic  Lungs- Comfortable on room air  Neuro: CN intact  Abdomen- Soft, non tender. Non distended. No surgical scars  Extremities- Symmetric, no open fx or edema  : No abnormalities

## 2024-10-10 NOTE — PROGRESS NOTE ADULT - REASON FOR ADMISSION
UC flare w/ bloody diarrhea

## 2024-10-10 NOTE — PROGRESS NOTE ADULT - PROBLEM SELECTOR PROBLEM 2
Clostridium difficile diarrhea

## 2024-10-10 NOTE — PROGRESS NOTE ADULT - SUBJECTIVE AND OBJECTIVE BOX
Patient is a 34y old  Male who presents with a chief complaint of UC flare w/ bloody diarrhea (10 Oct 2024 11:03)      SUBJECTIVE / OVERNIGHT EVENTS: Pt seen and examined at 11:35am, no overnight events, pt reports that he had 8-9 loose bm over 24hrs, no abdominal pain, is willing for sx, no other complaints.      MEDICATIONS  (STANDING):  enoxaparin Injectable 40 milliGRAM(s) SubCutaneous every 24 hours  influenza   Vaccine 0.5 milliLiter(s) IntraMuscular once  methylPREDNISolone sodium succinate Injectable 20 milliGRAM(s) IV Push every 8 hours  pantoprazole    Tablet 40 milliGRAM(s) Oral before breakfast  sodium chloride 0.9%. 1000 milliLiter(s) (100 mL/Hr) IV Continuous <Continuous>    MEDICATIONS  (PRN):  melatonin 3 milliGRAM(s) Oral at bedtime PRN Insomnia      Vital Signs Last 24 Hrs  T(C): 36.9 (10 Oct 2024 10:40), Max: 36.9 (09 Oct 2024 21:30)  T(F): 98.4 (10 Oct 2024 10:40), Max: 98.4 (09 Oct 2024 21:30)  HR: 97 (10 Oct 2024 10:40) (69 - 100)  BP: 124/80 (10 Oct 2024 10:40) (124/80 - 148/-)  BP(mean): 89 (10 Oct 2024 05:48) (89 - 89)  RR: 17 (10 Oct 2024 10:40) (17 - 17)  SpO2: 99% (10 Oct 2024 10:40) (99% - 99%)    Parameters below as of 10 Oct 2024 10:40  Patient On (Oxygen Delivery Method): room air      CAPILLARY BLOOD GLUCOSE        I&O's Summary      PHYSICAL EXAM:  GENERAL: NAD  CHEST/LUNG: Clear to auscultation bilaterally; No wheeze  HEART: Regular rate and rhythm  ABDOMEN: Soft, Nontender, Nondistended  EXTREMITIES: no LE edema  PSYCH: Calm  NEUROLOGY: AAOx3  SKIN: dry and warm    LABS:                        13.9   29.76 )-----------( 453      ( 10 Oct 2024 06:40 )             42.1     10-10    135  |  99  |  8   ----------------------------<  111[H]  4.1   |  26  |  0.84    Ca    8.4      10 Oct 2024 06:40  Phos  4.7     10-10  Mg     2.10     10-10            Urinalysis Basic - ( 10 Oct 2024 06:40 )    Color: x / Appearance: x / SG: x / pH: x  Gluc: 111 mg/dL / Ketone: x  / Bili: x / Urobili: x   Blood: x / Protein: x / Nitrite: x   Leuk Esterase: x / RBC: x / WBC x   Sq Epi: x / Non Sq Epi: x / Bacteria: x        RADIOLOGY & ADDITIONAL TESTS:    Imaging Personally Reviewed:    Consultant(s) Notes Reviewed:      Care Discussed with Consultants/Other Providers:

## 2024-10-10 NOTE — H&P ADULT - NSHPSOCIALHISTORY_GEN_ALL_CORE
Lives in [place] where his parents are, however is of [place] ethnicity and lived in [place] for several years.

## 2024-10-10 NOTE — PRE PROCEDURE NOTE - PRE PROCEDURE EVALUATION
PRE OPERATIVE NOTE    Pre-op Diagnosis: ulcerative colitis  Procedure: Laparoscopic total abdominal colectomy with end ileostomy  Surgeon: Dr. Lu                          13.9   29.76 )-----------( 453      ( 10 Oct 2024 06:40 )             42.1     10-10    135  |  99  |  8   ----------------------------<  111[H]  4.1   |  26  |  0.84    Ca    8.4      10 Oct 2024 06:40  Phos  4.7     10-10  Mg     2.10     10-10            CAPILLARY BLOOD GLUCOSE          Type & Screen:  CXR:  EKG:  Echocardiogram:     A/P: 34y Male planned for above procedure  LRD, NPO past midnight, except medications  2uprbc on hold for OR  Stoma nurse consulted for ostomy marking  IVF @ 100  AM labs  Consent in chart

## 2024-10-10 NOTE — PROGRESS NOTE ADULT - PROBLEM SELECTOR PROBLEM 1
Ulcerative colitis

## 2024-10-10 NOTE — PROGRESS NOTE ADULT - SUBJECTIVE AND OBJECTIVE BOX
GI progress note    Chief Complaint:  Patient is a 34y old  Male who presents with a chief complaint of UC flare w/ bloody diarrhea.     Interval Events:   - CRP uptrending; patient denies abdominal pain and had 8-9 BMs in last 24 hrs.   - pending tx to [facility]     Allergies:  No Known Allergies  mesalamine (Other (Mod to Severe))    Hospital Medications:  enoxaparin Injectable 40 milliGRAM(s) SubCutaneous every 24 hours  inFLIXimab-dyyb (INFLECTRA) IVPB 500 milliGRAM(s) IV Intermittent once  influenza   Vaccine 0.5 milliLiter(s) IntraMuscular once  methylPREDNISolone sodium succinate Injectable 20 milliGRAM(s) IV Push every 8 hours  pantoprazole    Tablet 40 milliGRAM(s) Oral before breakfast  sodium chloride 0.9%. 1000 milliLiter(s) IV Continuous <Continuous>        PHYSICAL EXAM:   Vital Signs:  Vital Signs Last 24 Hrs  T(C): 36.7 (05 Oct 2024 05:00), Max: 36.8 (04 Oct 2024 18:31)  T(F): 98 (05 Oct 2024 05:00), Max: 98.2 (04 Oct 2024 18:31)  HR: 89 (05 Oct 2024 05:00) (89 - 108)  BP: 120/65 (05 Oct 2024 05:00) (120/65 - 137/89)  BP(mean): --  RR: 18 (05 Oct 2024 05:00) (17 - 18)  SpO2: 98% (05 Oct 2024 05:00) (98% - 100%)    Parameters below as of 05 Oct 2024 05:00  Patient On (Oxygen Delivery Method): room air      Daily     Daily     GENERAL:  No acute distress  HEENT:  NCAT, no scleral icterus  CHEST: no resp distress  HEART:  RRR  ABDOMEN:  Soft, non-tender, non-distended  EXTREMITIES:  No cyanosis, clubbing, or edema  NEURO:  Alert and oriented x 3    LABS:                        13.9   22.21 )-----------( 476      ( 05 Oct 2024 06:22 )             41.4     Mean Cell Volume: 81.8 fL (10-05-24 @ 06:22)    10-05    134[L]  |  99  |  8   ----------------------------<  109[H]  3.8   |  23  |  0.79    Ca    8.5      05 Oct 2024 06:22  Phos  3.7     10-05  Mg     2.20     10-05          Urinalysis Basic - ( 05 Oct 2024 06:22 )    Color: x / Appearance: x / SG: x / pH: x  Gluc: 109 mg/dL / Ketone: x  / Bili: x / Urobili: x   Blood: x / Protein: x / Nitrite: x   Leuk Esterase: x / RBC: x / WBC x   Sq Epi: x / Non Sq Epi: x / Bacteria: x            Imaging:

## 2024-10-10 NOTE — PROGRESS NOTE ADULT - ASSESSMENT
35 y/o M with pmhx of UC comes to the ED with UC flare and C Diff. On admission, labs significant for elevated WBC and CT A/P showing UC flare. Patient follows with GI (Abran) at [facility], came here due to living close to hospital. Patient has been on Entyvio but was recently switched to [facility] outpatient. Inpatient, he was treated for C. Diff and started on steroids. He has had persistent bloody diarrhea through steroids so Remicade was started 10/3. Patient also received flex sig 10/3. Surgery consulted to establish care. Some symptomatic improvement on Remicaide, however worsening WBC and CRP.     Plan:  - Continue pain management  - Remicaide per GI   - Patient more amenable to surgery at this time   - Labs continue to worsen despite Remicaide   - Plan for transfer today (10/10) to [place] for colectomy Friday with Dr. Aly DEL CID-Team  [id number]     35 y/o M with pmhx of UC comes to the ED with UC flare and C Diff. On admission, labs significant for elevated WBC and CT A/P showing UC flare. Patient follows with GI (Abran) at [facility], came here due to living close to hospital. Patient has been on Entyvio but was recently switched to [facility] outpatient. Inpatient, he was treated for C. Diff and started on steroids. He has had persistent bloody diarrhea through steroids so Remicade was started 10/3. Patient also received flex sig 10/3. Surgery consulted to establish care. Some symptomatic improvement on Remicaide, however worsening WBC and CRP.     Plan:  - Continue pain management  - Remicade per GI   - Patient more amenable to surgery at this time   - Labs continue to worsen despite Remicaide   - Plan for transfer today (10/10) to [place] for colectomy Friday with Dr. Aly DEL CID-Team  [id number]

## 2024-10-10 NOTE — H&P ADULT - ASSESSMENT
34M w/ PMH UC and C.Diff presents as transfer from [facility] accepted by Dr. Lu for failure of medical management of UC on multiple biologics, now pre-op for laparoscopic possible open total abdominal colectomy with end ileostomy, stage 1 of IPAA formation.    -LRD  -NPO MN for OR, mIVF  -Preop note  -2U PRBC on hold  -Stoma nurses consulted for marking, appreciate recs  -GI consulted, appreciate recs  -Steroid taper to continue    D/w Dr. Aly Paredes MD  PGY-4  [place] [facility]

## 2024-10-10 NOTE — H&P ADULT - NSHPLABSRESULTS_GEN_ALL_CORE
ACC: [id number] EXAM:  CT ABDOMEN AND PELVIS IC   ORDERED BY: SHAWNEE   JONNY     PROCEDURE DATE:  09/26/2024          INTERPRETATION:  CLINICAL INFORMATION:    COMPARISON: None.    CONTRAST/COMPLICATIONS:  IV Contrast: Omnipaque 350  90 cc administered   10 cc discarded  Oral Contrast: NONE  Complications: None reported at time of study completion    PROCEDURE:  CT of the Abdomen and Pelvis was performed.  Sagittal and coronal reformats were performed.    FINDINGS:  LOWER CHEST: Within normal limits.    LIVER: Within normal limits.  BILE DUCTS: Normal caliber.  GALLBLADDER: Within normal limits.  SPLEEN: Within normal limits.  PANCREAS: Within normal limits.  ADRENALS: Within normal limits.  KIDNEYS/URETERS: Within normal limits.    BLADDER: Within normal limits.  REPRODUCTIVE ORGANS: Prostate is within normal limits.    BOWEL: No bowel obstruction. Appendix is normal. Diffuse mild colonic   wall thickening with loss of normal haustral markings on the left   consistent with ulcerative colitis and likely acute flare. No pneumatosis   or free air. No gross stricturing.  PERITONEUM/RETROPERITONEUM: Within normal limits.  VESSELS: Within normal limits.  LYMPH NODES: No lymphadenopathy.  ABDOMINAL WALL: Within normal limits.  BONES: Within normal limits.    IMPRESSION:  UC flare.        --- End of Report ---            SHELLI SHARPE MD; Attending Radiologist  This document has been electronically signed. Sep 26 2024 11:07PM                          13.9   29.76 )-----------( 453      ( 10 Oct 2024 06:40 )             42.1     10-10    135  |  99  |  8   ----------------------------<  111[H]  4.1   |  26  |  0.84    Ca    8.4      10 Oct 2024 06:40  Phos  4.7     10-10  Mg     2.10     10-10

## 2024-10-10 NOTE — H&P ADULT - HISTORY OF PRESENT ILLNESS
34M PMH Ulcerative Colitis (diagnosed 2016 via c-scope), C.Diff (completed 10d Fidaxomicin course) presents as tx from [facility] where he was hospitalized for failure of medical management UC flare while on Stellara, now being considered for first stage J-pouch IPAA creation.     At [facility], given pulse steroids Solumedrol (on oral 40mg taper at home) and seen by GI team who recommended surgical evaluation.    Patient endorses he feels a little better and is able to eat.  34M PMH Ulcerative Colitis (diagnosed 2016 via c-scope), C.Diff (completed 10d Fidaxomicin course) presents as tx from [facility] where he was hospitalized for failure of medical management UC flare while on Stellara, now being considered for first stage J-pouch IPAA creation.     At [facility], given pulse steroids Solumedrol (on oral 40mg taper at home) and seen by GI team who recommended surgical evaluation.    Patient endorses he feels a little better and is able to eat. Has never had surgery before. Endorses his condition was limiting when he had flares as he would not be able to leave the house.

## 2024-10-10 NOTE — PROGRESS NOTE ADULT - PROBLEM SELECTOR PLAN 1
- ongoing flare for 3 months notable for episodes of bloody diarrhea  - routinely follows with GI Dr Edwin Osullivan at [facility]; previously on Entyvio and recently switched to Stelara (s/p loading dose, 1 maintenance dose)  - currently receiving IV steroids (methylprednisolone 20mg q8h) - PPI ppx and glucose monitoring  - s/p flex-sig 10/3 - biopsy taken, severe UC noted  - s/p infliximab x2  -noted rise in WBC, apprec GI involvement, concerned with rising labs, colectomy now being discussed, pt is agreeable for sx  - Discussed with sx attending, plan is for him to be transferred to [facility] on Thursday and sx planned for Friday  -awaiting transfer to [facility] today

## 2024-10-10 NOTE — PROGRESS NOTE ADULT - SUBJECTIVE AND OBJECTIVE BOX
GENERAL SURGERY PROGRESS NOTE    Patient: RADHA BUCIO , 34y ([birth year])Male   MRN: [id number]  Location: [address]  Visit: 09-26-24 Inpatient  Date: 10-10-24 @ 06:16    Subjective: No acute events overnight. Patient resting comfortably in bed, no complaints.     PAST MEDICAL & SURGICAL HISTORY:  Ulcerative colitis      No significant past surgical history          Vitals: T(F): 98.1 (10-10-24 @ 05:48), Max: 98.4 (10-09-24 @ 21:30)  HR: 69 (10-10-24 @ 05:48)  BP: 148/- (10-10-24 @ 05:48)  RR: 17 (10-10-24 @ 05:48)  SpO2: 99% (10-10-24 @ 05:48)      Diet, Regular:   Fiber/Residue Restricted (LOWFIBER)  No Concentrated Phosphorus  Halal    PHYSICAL EXAM  GEN: No acute distress   CV: RRR, no JVD  LUNGS: Respirations unlabored, no use of accessory muscles  ABD: Abdomen soft, ND, NT   EXT: No peripheral edema. Regular range of motion.       MEDICATIONS  (STANDING):  enoxaparin Injectable 40 milliGRAM(s) SubCutaneous every 24 hours  influenza   Vaccine 0.5 milliLiter(s) IntraMuscular once  methylPREDNISolone sodium succinate Injectable 20 milliGRAM(s) IV Push every 8 hours  pantoprazole    Tablet 40 milliGRAM(s) Oral before breakfast  sodium chloride 0.9%. 1000 milliLiter(s) (100 mL/Hr) IV Continuous <Continuous>    MEDICATIONS  (PRN):  melatonin 3 milliGRAM(s) Oral at bedtime PRN Insomnia      DVT PROPHYLAXIS: SCDs, enoxaparin Injectable 40 milliGRAM(s) SubCutaneous every 24 hours    GI PROPHYLAXIS: pantoprazole    Tablet 40 milliGRAM(s) Oral before breakfast    ANTICOAGULATION:   ANTIBIOTICS:       LAB/STUDIES:  CAPILLARY BLOOD GLUCOSE                              13.5   31.36 )-----------( 471      ( 09 Oct 2024 05:15 )             39.6     10-09    134[L]  |  99  |  9   ----------------------------<  108[H]  4.0   |  25  |  0.73    Ca    8.3[L]      09 Oct 2024 05:15  Phos  3.6     10-09  Mg     2.10     10-09          Urinalysis Basic - ( 09 Oct 2024 05:15 )    Color: x / Appearance: x / SG: x / pH: x  Gluc: 108 mg/dL / Ketone: x  / Bili: x / Urobili: x   Blood: x / Protein: x / Nitrite: x   Leuk Esterase: x / RBC: x / WBC x   Sq Epi: x / Non Sq Epi: x / Bacteria: x

## 2024-10-10 NOTE — PRE PROCEDURE NOTE - ATTENDING COMMENTS
I saw and examined the patient. Agree with above findings. Plan is total abdominal colectomy. He consents.    Karli Lu MD 10/11/2024

## 2024-10-10 NOTE — PROGRESS NOTE ADULT - PROVIDER SPECIALTY LIST ADULT
Anesthesia
Gastroenterology
Infectious Disease
Colorectal Surgery
Gastroenterology
Hospitalist
Gastroenterology
Hospitalist
Infectious Disease
Colorectal Surgery
Gastroenterology
Gastroenterology
Hospitalist

## 2024-10-10 NOTE — PROGRESS NOTE ADULT - ASSESSMENT
35 yo M with pmhx of UC comes to the ED after experiencing worsening diarrhea for the past one month with outpatient stool studies positive for C Diff and initiation of fidaxomicin. On admission, labs significant for elevated WBC and CT A/P showing UC flare. GI consulted for further management    Impression:  #UC flare  #C diff+  #bloody diarrhea   - patient with hx of UC and trialed on multiple biologics and steroid taper  - also found to be C diff + outpatient   - overall would favor presentation to be UC flare due to bloody diarrhea, as C Diff does not usually cause hematochezia  - HBV serologies neg for HBV  - per [facility], quant gold neg on 7/10/24  - stool Cx neg  - s/p remicade IV 500mg x 1 on 10/2 and then second dose of 500mg on 10/5  - s/p colonoscopy on 10/3/24, severe UC pancolitis w/ Leahy score 3. S/p biopsies for disease activity and CMV.  - fecal calprotectin 3740  - path from 10/3 colonoscopy showing severe colitis in right, left, and rectum bx w/ histological features suggestive of IBD; negative for dysplasia and CMV stain pending  - today, patient with uptrending CRP. Patient is having an inadequate response to remicade and is indicated for surgical intervention. Colorectal surgery already onboard and planning on tx to [facility] on today and colectomy on Friday.     Recommendations:  - c/w solumedrol IV 20mg q8hrs for now  - appreciate colorectal surgery recs re colectomy timing; per surgery note, tx to [facility] today and colectomy on Friday.   - serial abdominal exam; if becomes febrile, more tachycardic, worsening CRP/leukocytosis, and new abdominal exam findings, would obtain CT and stat page colorectal surgery  - s/p 10 day course of fidaxomicin  - monitor stool counts  - please trend CBC, CMP, and CRP daily  - low residue diet  - c/w DVT ppx as patients with UC flare are high risk for VTE  - avoid opioids, anti-cholinergics, and anti-diarrheals as these can predispose to toxic megacolon  - avoid NSAIDs     Note incomplete until finalized by attending signature/attestation.    Bentley Nichols, PGY-4  Gastroenterology & Hepatology Fellow  Available on TEAMS  Long range pager #: [phone]  Short range pager#: [phone]    For non-urgent consults, please send email to [email or web address] (for [facility]) or [email or web address] (for [facility])

## 2024-10-10 NOTE — PROGRESS NOTE ADULT - ATTENDING COMMENTS
33 yo M with pmhx of UC comes to the ED after experiencing worsening diarrhea for the past one month with outpatient stool studies positive for C Diff and initiation of fidaxomicin. On admission, labs significant for elevated WBC and CT A/P showing UC flare. GI consulted for further management    improving objectively via VS and stool caliber while on IV solumedrol  discussed transfer to [facility] as he is a pt of Dr Osullivan, (pt was offered transfer to [place] over the weekend and he refused accidentally)  in the meanwhile, IV solumedrol, will hold off flex sig as per [place] request, will be transferred
Agree with above.    Patient was seen by our team on 09-28-24. I agree with the findings and pan of care as documented in the fellow/resident/medical student/physician assistant/nurse practitioner.    Today we are treating the patient for:   - UC flair  - failure of multiple treatments  - C diff    ***General note with plan / recommendation:   35 yo M with pmhx of UC comes to the ED after experiencing worsening diarrhea for the past one month with outpatient stool studies positive for C Diff and initiation of fidaxomicin. On CT found pan colitis.  He has tried: Humira, Entyvio, Stelara, and multiple steroid tapers.  Agree with above recommendations    Billing:  I have reviewed records from labs, chart.  Other:  - Thank you for involving us in the care of this patient. If you have any questions regarding the plan of care please do not hesitate to call us back.  - For any questions on Monday - Friday 8 am to 5pm please message via [employer] or email [email or web address]   - For any Urgent off hours consults please contact on- call GI team. See Amion schedule ([facility]), [employer] paging system ([facility]), or call hospital  ([facility])  - Rest of management as per primary team
35 yo M with pmhx of UC comes to the ED after experiencing worsening diarrhea for the past one month with outpatient stool studies positive for C Diff and initiation of fidaxomicin. On admission, labs significant for elevated WBC and CT A/P showing UC flare. GI consulted for further management    improving objectively via VS and stool caliber while on IV solumedrol  discussed transfer to [facility] as he is a pt of Dr Osullivan, (pt was offered transfer to [place] over the weekend and he refused accidentally)- discussed case again with [facility], since he is not improving clinically today- still loose stools, abd discomfort, elevated sed rate  C/w solumedrol  Remicade 500mg X1 dose  Flex sig tomorrow so NPO p midnight, give 2 tap water enemas in the AM
9 BMs over 24 hours  CRP slightly up (no decrease 72 hours after 2nd infusion remicade)  Still appears well and feels well    While there is improvement, objective markers suggestive that remicade will not be successful  Explained to patient that at this time, I think colectomy may be the proper treatment option  He is considering and will discuss with colorectal surgery today when they return  Will start weening down on steroids pending surgical plan    c/w diet as tolerated  c/w dvt ppx  GI to follow
Severe UC  failure of medical therapy  pending transfer for surgery
some more loose stools today, but otherwise feeling well  wbc and crp elevating  plan for OR friday  Agree with this plan  many questions answered
Patient reports somewhat modest symptomatic improvement since initial induction dose of infliximab administered on 10/210/2/2024.  Clinical course noted–acute severe ulcerative colitis with colonoscopy noted for severely active ulcerative colitis.  Was having approximately 15 bloody diarrheal stools daily.  Now down to about 10 diarrheal stools daily.  Reports no fever, nausea or vomiting.  PE/labs as noted.  Current treatment plan discussed at length with patient.  Patient expressed concern over ongoing continued symptoms and although he has modestly improved he initiated discussion regarding repeat dosing of infliximab.  He indicates he has been in touch with his IBD specialist at [place] who indicated that would be an option for  an early repeat infliximab dose in an effort to optimize his treatment response.  Therefore, plan will be to administer repeat dose of infliximab 500 mg IV today.  IMP:  UC–acute severe ulcerative colitis.  REC:  -  Low residue diet.  -  DVT prophylaxis.  -  Daily CBC and CRP.  Periodic monitoring of BMP.  Follow-up fecal calprotectin.  -  Solu-Medrol 20 mg IV every 8 hours.  -  Repeat dose of infliximab 500 mg IV to be administered today.  -  Close monitoring of clinical status and  if the patient significantly improves we will then be plan for completion of infliximab induction regimen.
Complicated case with critically ill patient. Discussed directly with [facility] team (primary GI) and with Colorectal team (Dr. Martinez) directly  Patient with UC refractory to multiple biologics and not responding to PO and then IV steroids during this admission given Remicade x 2 doses.  On admit with liquid, 15 blood bm per day in setting of cdiff now s/p dificid.  After remicade, now finally down to 5 BM overnight, more formed and no blood.  He has benign exam (is on steroids) and feels much better.  However, he had an increase in wbc and increase in crp today.  Primary GI ([place] [facility] [place]) concerned that will need colectomy and advocating for this.      Will get repeat labs to see if truly uptrending labs  Discussed directly with Dr. Martinez who will evaluate today re: potential colectomy (patient was potentially interested)  If any clinical worsening then may need CT scan  Will ween down on steroids    GI to follow  Discussed with patient and family at length.  Many questions answered
33 yo M with pmhx of UC comes to the ED after experiencing worsening diarrhea for the past one month with outpatient stool studies positive for C Diff and initiation of fidaxomicin. On admission, labs significant for elevated WBC and CT A/P showing UC flare. GI consulted for further management    improving objectively via VS and stool caliber while on IV solumedrol  discussed transfer to [facility] as he is a pt of Dr Osullivan, (pt was offered transfer to [place] over the weekend and he refused accidentally)- discussed case again with [facility], since he is not improving clinically today- still loose stools, abd discomfort, elevated sed rate    C/w solumedrol  Remicade 500mg X1 dose on 10/2  CRP elevated  s/p colonoscopy 10/3 with severe colitis, mild dusky areas, low threshold for potential IPAA  will monitor symptoms over 24 hours and will likely need Remicade 500mg IV 10/5  monitor CRP levels
35 yo M with pmhx of UC comes to the ED after experiencing worsening diarrhea for the past one month with outpatient stool studies positive for C Diff and initiation of fidaxomicin. On admission, labs significant for elevated WBC and CT A/P showing UC flare. GI consulted for further management    improving objectively via VS and stool caliber while on IV solumedrol  discussed transfer to [facility] as he is a pt of Dr Osullivan, (pt was offered transfer to [place] over the weekend and he refused accidentally)  in the meanwhile, IV solumedrol, will hold off flex sig as per [place] request, will be transferred

## 2024-10-11 ENCOUNTER — APPOINTMENT (OUTPATIENT)
Dept: COLORECTAL SURGERY | Facility: HOSPITAL | Age: 34
End: 2024-10-11

## 2024-10-11 LAB
ANION GAP SERPL CALC-SCNC: 10 MMOL/L — SIGNIFICANT CHANGE UP (ref 5–17)
APTT BLD: 26.7 SEC — SIGNIFICANT CHANGE UP (ref 24.5–35.6)
BUN SERPL-MCNC: 9 MG/DL — SIGNIFICANT CHANGE UP (ref 7–23)
CALCIUM SERPL-MCNC: 8.8 MG/DL — SIGNIFICANT CHANGE UP (ref 8.4–10.5)
CHLORIDE SERPL-SCNC: 99 MMOL/L — SIGNIFICANT CHANGE UP (ref 96–108)
CO2 SERPL-SCNC: 26 MMOL/L — SIGNIFICANT CHANGE UP (ref 22–31)
CREAT SERPL-MCNC: 0.83 MG/DL — SIGNIFICANT CHANGE UP (ref 0.5–1.3)
EGFR: 119 ML/MIN/1.73M2 — SIGNIFICANT CHANGE UP
GLUCOSE SERPL-MCNC: 128 MG/DL — HIGH (ref 70–99)
HCT VFR BLD CALC: 42.6 % — SIGNIFICANT CHANGE UP (ref 39–50)
HGB BLD-MCNC: 14.1 G/DL — SIGNIFICANT CHANGE UP (ref 13–17)
INR BLD: 1.09 RATIO — SIGNIFICANT CHANGE UP (ref 0.85–1.16)
MAGNESIUM SERPL-MCNC: 2.2 MG/DL — SIGNIFICANT CHANGE UP (ref 1.6–2.6)
MCHC RBC-ENTMCNC: 27 PG — SIGNIFICANT CHANGE UP (ref 27–34)
MCHC RBC-ENTMCNC: 33.1 GM/DL — SIGNIFICANT CHANGE UP (ref 32–36)
MCV RBC AUTO: 81.5 FL — SIGNIFICANT CHANGE UP (ref 80–100)
NRBC # BLD: 0 /100 WBCS — SIGNIFICANT CHANGE UP (ref 0–0)
PHOSPHATE SERPL-MCNC: 3.5 MG/DL — SIGNIFICANT CHANGE UP (ref 2.5–4.5)
PLATELET # BLD AUTO: 464 K/UL — HIGH (ref 150–400)
POTASSIUM SERPL-MCNC: 3.9 MMOL/L — SIGNIFICANT CHANGE UP (ref 3.5–5.3)
POTASSIUM SERPL-SCNC: 3.9 MMOL/L — SIGNIFICANT CHANGE UP (ref 3.5–5.3)
PROTHROM AB SERPL-ACNC: 12.4 SEC — SIGNIFICANT CHANGE UP (ref 9.9–13.4)
RBC # BLD: 5.23 M/UL — SIGNIFICANT CHANGE UP (ref 4.2–5.8)
RBC # FLD: 12.8 % — SIGNIFICANT CHANGE UP (ref 10.3–14.5)
SODIUM SERPL-SCNC: 135 MMOL/L — SIGNIFICANT CHANGE UP (ref 135–145)
WBC # BLD: 30.07 K/UL — HIGH (ref 3.8–10.5)
WBC # FLD AUTO: 30.07 K/UL — HIGH (ref 3.8–10.5)

## 2024-10-11 PROCEDURE — 71045 X-RAY EXAM CHEST 1 VIEW: CPT | Mod: 26

## 2024-10-11 PROCEDURE — 88307 TISSUE EXAM BY PATHOLOGIST: CPT | Mod: 26

## 2024-10-11 PROCEDURE — 44210 LAPARO TOTAL PROCTOCOLECTOMY: CPT

## 2024-10-11 PROCEDURE — 88342 IMHCHEM/IMCYTCHM 1ST ANTB: CPT | Mod: 26

## 2024-10-11 DEVICE — STAPLER COVIDIEN TA 60 GREEN: Type: IMPLANTABLE DEVICE | Status: FUNCTIONAL

## 2024-10-11 DEVICE — STAPLER COVIDIEN TRI-STAPLE 60MM PURPLE RELOAD: Type: IMPLANTABLE DEVICE | Status: FUNCTIONAL

## 2024-10-11 RX ORDER — HYDROMORPHONE HYDROCHLORIDE 1 MG/ML
1 INJECTION, SOLUTION INTRAMUSCULAR; INTRAVENOUS; SUBCUTANEOUS
Refills: 0 | Status: DISCONTINUED | OUTPATIENT
Start: 2024-10-11 | End: 2024-10-14

## 2024-10-11 RX ORDER — ACETAMINOPHEN 325 MG
975 TABLET ORAL EVERY 6 HOURS
Refills: 0 | Status: DISCONTINUED | OUTPATIENT
Start: 2024-10-11 | End: 2024-10-14

## 2024-10-11 RX ORDER — MORPHINE SULFATE 30 MG/1
0.3 TABLET, FILM COATED, EXTENDED RELEASE ORAL ONCE
Refills: 0 | Status: DISCONTINUED | OUTPATIENT
Start: 2024-10-11 | End: 2024-10-11

## 2024-10-11 RX ORDER — OXYCODONE HYDROCHLORIDE 30 MG/1
5 TABLET, FILM COATED, EXTENDED RELEASE ORAL
Refills: 0 | Status: DISCONTINUED | OUTPATIENT
Start: 2024-10-11 | End: 2024-10-14

## 2024-10-11 RX ORDER — NALOXONE HYDROCHLORIDE 0.4 MG/ML
0.1 INJECTION, SOLUTION INTRAMUSCULAR; INTRAVENOUS; SUBCUTANEOUS
Refills: 0 | Status: DISCONTINUED | OUTPATIENT
Start: 2024-10-11 | End: 2024-10-14

## 2024-10-11 RX ORDER — SODIUM CHLORIDE IRRIG SOLUTION 0.9 %
1000 SOLUTION, IRRIGATION IRRIGATION
Refills: 0 | Status: DISCONTINUED | OUTPATIENT
Start: 2024-10-11 | End: 2024-10-13

## 2024-10-11 RX ORDER — PREDNISONE 5 MG/1
40 TABLET ORAL DAILY
Refills: 0 | Status: DISCONTINUED | OUTPATIENT
Start: 2024-10-12 | End: 2024-10-14

## 2024-10-11 RX ORDER — ENOXAPARIN SODIUM 150 MG/ML
40 INJECTION SUBCUTANEOUS EVERY 24 HOURS
Refills: 0 | Status: DISCONTINUED | OUTPATIENT
Start: 2024-10-11 | End: 2024-10-14

## 2024-10-11 RX ORDER — OXYCODONE HYDROCHLORIDE 30 MG/1
10 TABLET, FILM COATED, EXTENDED RELEASE ORAL
Refills: 0 | Status: DISCONTINUED | OUTPATIENT
Start: 2024-10-11 | End: 2024-10-14

## 2024-10-11 RX ORDER — NALBUPHINE HYDROCHLORIDE 10 MG/ML
2.5 INJECTION, SOLUTION INTRAMUSCULAR; INTRAVENOUS; SUBCUTANEOUS EVERY 6 HOURS
Refills: 0 | Status: DISCONTINUED | OUTPATIENT
Start: 2024-10-11 | End: 2024-10-14

## 2024-10-11 RX ORDER — ONDANSETRON HCL/PF 4 MG/2 ML
4 VIAL (ML) INJECTION EVERY 6 HOURS
Refills: 0 | Status: DISCONTINUED | OUTPATIENT
Start: 2024-10-11 | End: 2024-10-14

## 2024-10-11 RX ADMIN — METHYLPREDNISOLONE ACETATE 20 MILLIGRAM(S): 80 INJECTION, SUSPENSION INTRA-ARTICULAR; INTRALESIONAL; INTRAMUSCULAR; SOFT TISSUE at 10:19

## 2024-10-11 RX ADMIN — OXYCODONE HYDROCHLORIDE 10 MILLIGRAM(S): 30 TABLET, FILM COATED, EXTENDED RELEASE ORAL at 21:39

## 2024-10-11 RX ADMIN — METHYLPREDNISOLONE ACETATE 20 MILLIGRAM(S): 80 INJECTION, SUSPENSION INTRA-ARTICULAR; INTRALESIONAL; INTRAMUSCULAR; SOFT TISSUE at 02:22

## 2024-10-11 RX ADMIN — OXYCODONE HYDROCHLORIDE 10 MILLIGRAM(S): 30 TABLET, FILM COATED, EXTENDED RELEASE ORAL at 22:39

## 2024-10-11 RX ADMIN — Medication 100 MILLILITER(S): at 01:05

## 2024-10-11 RX ADMIN — ENOXAPARIN SODIUM 40 MILLIGRAM(S): 150 INJECTION SUBCUTANEOUS at 17:14

## 2024-10-11 NOTE — PRE-OP CHECKLIST - LATEX ALLERGY
08-04    141  |  103  |  23.3<H>  ----------------------------<  91  4.4   |  22.0  |  1.05    Ca    9.5      04 Aug 2023 06:11    A1C with Estimated Average Glucose Result: 5.3 % (04-19-23 @ 06:28)  08-04 Na141 mmol/L Glu 91 mg/dL K+ 4.4 mmol/L Cr  1.05 mg/dL BUN 23.3 mg/dL<H> Phos n/a   Alb n/a   PAB n/a        no

## 2024-10-11 NOTE — CHART NOTE - NSCHARTNOTEFT_GEN_A_CORE
Patient seen and examined approx 1820PM in room. Endorses he feels well, has no pain as of now which he endorses to the spinal. Family at bedside, with questions regarding allowed activity and diet restrictions. Stable vital signs at this time.     Physical exam:    /71 HR 85 SPO2 96 T 97.6 RR 18    General- NAD. AOx4. Pleasant and responsive  Lungs- Comfortable on room air  Abdomen- Soft, minimally distended. Port sites x 4 c/d/i under dermabond. Pfannelstein c/d/i. End ileostomy pink  Extremities- Symmetric, no swelling  - Alfaro    A/P:    34M now POD 0 from laparoscopic total abdominal colectomy w/ end ileostomy.     -CLD  -mIVF  -Monitor ostomy output  -PO Prednisone 40mg daily  -Alfaro, likely out tomorrow  -DVT ppx  -F/u path    Diaz Paredes MD  PGY-4  [place] Team Patient seen and examined approx @1820 in room. Endorses he feels well, has no pain as of now which he endorses to the spinal. Family at bedside, with questions regarding allowed activity and diet restrictions. Stable vital signs at this time.     Physical exam:    /71 HR 85 SPO2 96 T 97.6 RR 18    General- NAD. AOx4. Pleasant and responsive  Lungs- Comfortable on room air  Abdomen- Soft, minimally distended. Port sites x 4 c/d/i under dermabond. Pfannelstein c/d/i. End ileostomy pink  Extremities- Symmetric, no swelling  - Alfaro    A/P:    34M now POD 0 from laparoscopic total abdominal colectomy w/ end ileostomy.     -CLD  -mIVF  -Monitor ostomy output  -PO Prednisone 40mg daily  -Alfaro, likely out tomorrow  -DVT ppx  -F/u path    Diaz Paredes MD  PGY-4  [place] Team Patient seen and examined approx @1820 in room. Endorses he feels well, has no pain as of now which he endorses to the spinal. Family at bedside, with questions regarding allowed activity and diet restrictions. Stable vital signs at this time.     Physical exam:    /71 HR 85 SPO2 96 T 97.6 RR 18    General- NAD. AOx4. Pleasant and responsive  Lungs- Comfortable on room air  Abdomen- Soft, minimally distended. Port sites x 4 c/d/i under dermabond. Pfannelstiel c/d/i. End ileostomy pink  Extremities- Symmetric, no swelling  - Alfaro    A/P:    34M now POD 0 from laparoscopic total abdominal colectomy w/ end ileostomy.     -CLD  -mIVF  -Monitor ostomy output  -PO Prednisone 40mg daily  -Alfaro, likely out tomorrow  -DVT ppx  -F/u path    Diaz Paredes MD  PGY-4  [place] Team

## 2024-10-11 NOTE — BRIEF OPERATIVE NOTE - OPERATION/FINDINGS
Lap TAC w/ end ileostomy. Colon mobilized medial to lateral starting from cecum. End ileostomy brought up and Brooked. Rectal stump tagged to fascia of Pfannelsteil incision.

## 2024-10-12 LAB
ANION GAP SERPL CALC-SCNC: 7 MMOL/L — SIGNIFICANT CHANGE UP (ref 5–17)
BUN SERPL-MCNC: 7 MG/DL — SIGNIFICANT CHANGE UP (ref 7–23)
CALCIUM SERPL-MCNC: 7.8 MG/DL — LOW (ref 8.4–10.5)
CHLORIDE SERPL-SCNC: 100 MMOL/L — SIGNIFICANT CHANGE UP (ref 96–108)
CO2 SERPL-SCNC: 28 MMOL/L — SIGNIFICANT CHANGE UP (ref 22–31)
CREAT SERPL-MCNC: 0.88 MG/DL — SIGNIFICANT CHANGE UP (ref 0.5–1.3)
EGFR: 117 ML/MIN/1.73M2 — SIGNIFICANT CHANGE UP
GLUCOSE SERPL-MCNC: 92 MG/DL — SIGNIFICANT CHANGE UP (ref 70–99)
HCT VFR BLD CALC: 31.8 % — LOW (ref 39–50)
HGB BLD-MCNC: 10.3 G/DL — LOW (ref 13–17)
MAGNESIUM SERPL-MCNC: 2.1 MG/DL — SIGNIFICANT CHANGE UP (ref 1.6–2.6)
MCHC RBC-ENTMCNC: 27 PG — SIGNIFICANT CHANGE UP (ref 27–34)
MCHC RBC-ENTMCNC: 32.4 GM/DL — SIGNIFICANT CHANGE UP (ref 32–36)
MCV RBC AUTO: 83.5 FL — SIGNIFICANT CHANGE UP (ref 80–100)
NRBC # BLD: 0 /100 WBCS — SIGNIFICANT CHANGE UP (ref 0–0)
PHOSPHATE SERPL-MCNC: 3.3 MG/DL — SIGNIFICANT CHANGE UP (ref 2.5–4.5)
PLATELET # BLD AUTO: 334 K/UL — SIGNIFICANT CHANGE UP (ref 150–400)
POTASSIUM SERPL-MCNC: 3.6 MMOL/L — SIGNIFICANT CHANGE UP (ref 3.5–5.3)
POTASSIUM SERPL-SCNC: 3.6 MMOL/L — SIGNIFICANT CHANGE UP (ref 3.5–5.3)
RBC # BLD: 3.81 M/UL — LOW (ref 4.2–5.8)
RBC # FLD: 13 % — SIGNIFICANT CHANGE UP (ref 10.3–14.5)
SODIUM SERPL-SCNC: 135 MMOL/L — SIGNIFICANT CHANGE UP (ref 135–145)
WBC # BLD: 16.52 K/UL — HIGH (ref 3.8–10.5)
WBC # FLD AUTO: 16.52 K/UL — HIGH (ref 3.8–10.5)

## 2024-10-12 RX ORDER — INFLUENZA VIRUS VACCINE 15; 15; 15; 15 UG/.5ML; UG/.5ML; UG/.5ML; UG/.5ML
0.5 SUSPENSION INTRAMUSCULAR ONCE
Refills: 0 | Status: DISCONTINUED | OUTPATIENT
Start: 2024-10-12 | End: 2024-10-14

## 2024-10-12 RX ADMIN — Medication 975 MILLIGRAM(S): at 06:20

## 2024-10-12 RX ADMIN — Medication 100 MILLILITER(S): at 21:45

## 2024-10-12 RX ADMIN — Medication 975 MILLIGRAM(S): at 11:02

## 2024-10-12 RX ADMIN — Medication 975 MILLIGRAM(S): at 18:03

## 2024-10-12 RX ADMIN — Medication 975 MILLIGRAM(S): at 07:02

## 2024-10-12 RX ADMIN — Medication 100 MILLILITER(S): at 08:35

## 2024-10-12 RX ADMIN — Medication 500 MILLIGRAM(S): at 18:03

## 2024-10-12 RX ADMIN — ENOXAPARIN SODIUM 40 MILLIGRAM(S): 150 INJECTION SUBCUTANEOUS at 18:03

## 2024-10-12 RX ADMIN — OXYCODONE HYDROCHLORIDE 5 MILLIGRAM(S): 30 TABLET, FILM COATED, EXTENDED RELEASE ORAL at 17:36

## 2024-10-12 RX ADMIN — Medication 40 MILLIEQUIVALENT(S): at 18:09

## 2024-10-12 RX ADMIN — OXYCODONE HYDROCHLORIDE 5 MILLIGRAM(S): 30 TABLET, FILM COATED, EXTENDED RELEASE ORAL at 16:36

## 2024-10-12 RX ADMIN — Medication 500 MILLIGRAM(S): at 11:02

## 2024-10-12 RX ADMIN — OXYCODONE HYDROCHLORIDE 10 MILLIGRAM(S): 30 TABLET, FILM COATED, EXTENDED RELEASE ORAL at 21:45

## 2024-10-12 RX ADMIN — OXYCODONE HYDROCHLORIDE 5 MILLIGRAM(S): 30 TABLET, FILM COATED, EXTENDED RELEASE ORAL at 11:49

## 2024-10-12 RX ADMIN — Medication 975 MILLIGRAM(S): at 12:02

## 2024-10-12 RX ADMIN — OXYCODONE HYDROCHLORIDE 10 MILLIGRAM(S): 30 TABLET, FILM COATED, EXTENDED RELEASE ORAL at 22:45

## 2024-10-12 RX ADMIN — OXYCODONE HYDROCHLORIDE 5 MILLIGRAM(S): 30 TABLET, FILM COATED, EXTENDED RELEASE ORAL at 10:49

## 2024-10-12 RX ADMIN — Medication 975 MILLIGRAM(S): at 19:03

## 2024-10-12 RX ADMIN — PREDNISONE 40 MILLIGRAM(S): 5 TABLET ORAL at 08:34

## 2024-10-12 NOTE — PATIENT PROFILE ADULT - HAVE YOU BEEN EATING POORLY BECAUSE OF A DECREASED APPETITE?
Cholesterol is stable  His HDL is outstanding  Tolerating atorvastatin, therefore continue  Check in 6 months  No (0)

## 2024-10-13 LAB
ANION GAP SERPL CALC-SCNC: 7 MMOL/L — SIGNIFICANT CHANGE UP (ref 5–17)
BUN SERPL-MCNC: 4 MG/DL — LOW (ref 7–23)
CALCIUM SERPL-MCNC: 8 MG/DL — LOW (ref 8.4–10.5)
CHLORIDE SERPL-SCNC: 101 MMOL/L — SIGNIFICANT CHANGE UP (ref 96–108)
CO2 SERPL-SCNC: 26 MMOL/L — SIGNIFICANT CHANGE UP (ref 22–31)
CREAT SERPL-MCNC: 0.68 MG/DL — SIGNIFICANT CHANGE UP (ref 0.5–1.3)
EGFR: 127 ML/MIN/1.73M2 — SIGNIFICANT CHANGE UP
GLUCOSE SERPL-MCNC: 90 MG/DL — SIGNIFICANT CHANGE UP (ref 70–99)
HCT VFR BLD CALC: 33.7 % — LOW (ref 39–50)
HGB BLD-MCNC: 10.8 G/DL — LOW (ref 13–17)
MAGNESIUM SERPL-MCNC: 2 MG/DL — SIGNIFICANT CHANGE UP (ref 1.6–2.6)
MCHC RBC-ENTMCNC: 26.9 PG — LOW (ref 27–34)
MCHC RBC-ENTMCNC: 32 GM/DL — SIGNIFICANT CHANGE UP (ref 32–36)
MCV RBC AUTO: 83.8 FL — SIGNIFICANT CHANGE UP (ref 80–100)
NRBC # BLD: 0 /100 WBCS — SIGNIFICANT CHANGE UP (ref 0–0)
PHOSPHATE SERPL-MCNC: 2.3 MG/DL — LOW (ref 2.5–4.5)
PLATELET # BLD AUTO: 366 K/UL — SIGNIFICANT CHANGE UP (ref 150–400)
POTASSIUM SERPL-MCNC: 3.4 MMOL/L — LOW (ref 3.5–5.3)
POTASSIUM SERPL-SCNC: 3.4 MMOL/L — LOW (ref 3.5–5.3)
RBC # BLD: 4.02 M/UL — LOW (ref 4.2–5.8)
RBC # FLD: 12.8 % — SIGNIFICANT CHANGE UP (ref 10.3–14.5)
SODIUM SERPL-SCNC: 134 MMOL/L — LOW (ref 135–145)
WBC # BLD: 15.55 K/UL — HIGH (ref 3.8–10.5)
WBC # FLD AUTO: 15.55 K/UL — HIGH (ref 3.8–10.5)

## 2024-10-13 RX ORDER — LIDOCAINE 50 MG/G
1 CREAM TOPICAL EVERY 24 HOURS
Refills: 0 | Status: DISCONTINUED | OUTPATIENT
Start: 2024-10-13 | End: 2024-10-14

## 2024-10-13 RX ORDER — SOD PHOS DI, MONO/K PHOS MONO 250 MG
1 TABLET ORAL ONCE
Refills: 0 | Status: COMPLETED | OUTPATIENT
Start: 2024-10-13 | End: 2024-10-13

## 2024-10-13 RX ADMIN — Medication 500 MILLIGRAM(S): at 06:14

## 2024-10-13 RX ADMIN — LIDOCAINE 1 PATCH: 50 CREAM TOPICAL at 19:57

## 2024-10-13 RX ADMIN — Medication 975 MILLIGRAM(S): at 07:14

## 2024-10-13 RX ADMIN — Medication 100 MILLILITER(S): at 11:11

## 2024-10-13 RX ADMIN — PREDNISONE 40 MILLIGRAM(S): 5 TABLET ORAL at 06:14

## 2024-10-13 RX ADMIN — Medication 500 MILLIGRAM(S): at 00:12

## 2024-10-13 RX ADMIN — OXYCODONE HYDROCHLORIDE 5 MILLIGRAM(S): 30 TABLET, FILM COATED, EXTENDED RELEASE ORAL at 12:54

## 2024-10-13 RX ADMIN — ENOXAPARIN SODIUM 40 MILLIGRAM(S): 150 INJECTION SUBCUTANEOUS at 17:45

## 2024-10-13 RX ADMIN — Medication 975 MILLIGRAM(S): at 11:10

## 2024-10-13 RX ADMIN — OXYCODONE HYDROCHLORIDE 5 MILLIGRAM(S): 30 TABLET, FILM COATED, EXTENDED RELEASE ORAL at 13:54

## 2024-10-13 RX ADMIN — Medication 975 MILLIGRAM(S): at 00:12

## 2024-10-13 RX ADMIN — Medication 975 MILLIGRAM(S): at 01:12

## 2024-10-13 RX ADMIN — Medication 975 MILLIGRAM(S): at 12:10

## 2024-10-13 RX ADMIN — Medication 1 PACKET(S): at 11:10

## 2024-10-13 RX ADMIN — Medication 500 MILLIGRAM(S): at 23:45

## 2024-10-13 RX ADMIN — Medication 40 MILLIEQUIVALENT(S): at 11:10

## 2024-10-13 RX ADMIN — Medication 975 MILLIGRAM(S): at 18:45

## 2024-10-13 RX ADMIN — Medication 975 MILLIGRAM(S): at 17:45

## 2024-10-13 RX ADMIN — Medication 500 MILLIGRAM(S): at 11:10

## 2024-10-13 RX ADMIN — Medication 975 MILLIGRAM(S): at 06:14

## 2024-10-13 RX ADMIN — Medication 975 MILLIGRAM(S): at 23:45

## 2024-10-13 RX ADMIN — Medication 500 MILLIGRAM(S): at 17:45

## 2024-10-14 ENCOUNTER — TRANSCRIPTION ENCOUNTER (OUTPATIENT)
Age: 34
End: 2024-10-14

## 2024-10-14 VITALS
DIASTOLIC BLOOD PRESSURE: 84 MMHG | RESPIRATION RATE: 18 BRPM | SYSTOLIC BLOOD PRESSURE: 134 MMHG | HEART RATE: 100 BPM | OXYGEN SATURATION: 98 % | TEMPERATURE: 98 F

## 2024-10-14 LAB
ANION GAP SERPL CALC-SCNC: 7 MMOL/L — SIGNIFICANT CHANGE UP (ref 5–17)
BUN SERPL-MCNC: <4 MG/DL — LOW (ref 7–23)
CALCIUM SERPL-MCNC: 8.2 MG/DL — LOW (ref 8.4–10.5)
CHLORIDE SERPL-SCNC: 103 MMOL/L — SIGNIFICANT CHANGE UP (ref 96–108)
CO2 SERPL-SCNC: 27 MMOL/L — SIGNIFICANT CHANGE UP (ref 22–31)
CREAT SERPL-MCNC: 0.73 MG/DL — SIGNIFICANT CHANGE UP (ref 0.5–1.3)
EGFR: 124 ML/MIN/1.73M2 — SIGNIFICANT CHANGE UP
GLUCOSE SERPL-MCNC: 87 MG/DL — SIGNIFICANT CHANGE UP (ref 70–99)
HCT VFR BLD CALC: 35.8 % — LOW (ref 39–50)
HGB BLD-MCNC: 11.4 G/DL — LOW (ref 13–17)
MAGNESIUM SERPL-MCNC: 2.1 MG/DL — SIGNIFICANT CHANGE UP (ref 1.6–2.6)
MCHC RBC-ENTMCNC: 26.6 PG — LOW (ref 27–34)
MCHC RBC-ENTMCNC: 31.8 GM/DL — LOW (ref 32–36)
MCV RBC AUTO: 83.6 FL — SIGNIFICANT CHANGE UP (ref 80–100)
NRBC # BLD: 0 /100 WBCS — SIGNIFICANT CHANGE UP (ref 0–0)
PHOSPHATE SERPL-MCNC: 2.6 MG/DL — SIGNIFICANT CHANGE UP (ref 2.5–4.5)
PLATELET # BLD AUTO: 380 K/UL — SIGNIFICANT CHANGE UP (ref 150–400)
POTASSIUM SERPL-MCNC: 3.7 MMOL/L — SIGNIFICANT CHANGE UP (ref 3.5–5.3)
POTASSIUM SERPL-SCNC: 3.7 MMOL/L — SIGNIFICANT CHANGE UP (ref 3.5–5.3)
RBC # BLD: 4.28 M/UL — SIGNIFICANT CHANGE UP (ref 4.2–5.8)
RBC # FLD: 12.9 % — SIGNIFICANT CHANGE UP (ref 10.3–14.5)
SODIUM SERPL-SCNC: 137 MMOL/L — SIGNIFICANT CHANGE UP (ref 135–145)
WBC # BLD: 15.31 K/UL — HIGH (ref 3.8–10.5)
WBC # FLD AUTO: 15.31 K/UL — HIGH (ref 3.8–10.5)

## 2024-10-14 PROCEDURE — 80048 BASIC METABOLIC PNL TOTAL CA: CPT

## 2024-10-14 PROCEDURE — 88342 IMHCHEM/IMCYTCHM 1ST ANTB: CPT

## 2024-10-14 PROCEDURE — C9399: CPT

## 2024-10-14 PROCEDURE — 86850 RBC ANTIBODY SCREEN: CPT

## 2024-10-14 PROCEDURE — 88307 TISSUE EXAM BY PATHOLOGIST: CPT

## 2024-10-14 PROCEDURE — C1889: CPT

## 2024-10-14 PROCEDURE — 93005 ELECTROCARDIOGRAM TRACING: CPT

## 2024-10-14 PROCEDURE — 71045 X-RAY EXAM CHEST 1 VIEW: CPT

## 2024-10-14 PROCEDURE — 85610 PROTHROMBIN TIME: CPT

## 2024-10-14 PROCEDURE — 86900 BLOOD TYPING SEROLOGIC ABO: CPT

## 2024-10-14 PROCEDURE — 85027 COMPLETE CBC AUTOMATED: CPT

## 2024-10-14 PROCEDURE — 84100 ASSAY OF PHOSPHORUS: CPT

## 2024-10-14 PROCEDURE — 83735 ASSAY OF MAGNESIUM: CPT

## 2024-10-14 PROCEDURE — 85730 THROMBOPLASTIN TIME PARTIAL: CPT

## 2024-10-14 PROCEDURE — 86901 BLOOD TYPING SEROLOGIC RH(D): CPT

## 2024-10-14 PROCEDURE — 36415 COLL VENOUS BLD VENIPUNCTURE: CPT

## 2024-10-14 RX ORDER — SOD PHOS DI, MONO/K PHOS MONO 250 MG
1 TABLET ORAL ONCE
Refills: 0 | Status: COMPLETED | OUTPATIENT
Start: 2024-10-14 | End: 2024-10-14

## 2024-10-14 RX ORDER — NALOXONE HYDROCHLORIDE 0.4 MG/ML
4 INJECTION, SOLUTION INTRAMUSCULAR; INTRAVENOUS; SUBCUTANEOUS
Qty: 1 | Refills: 0
Start: 2024-10-14

## 2024-10-14 RX ORDER — LIDOCAINE 50 MG/G
1 CREAM TOPICAL
Qty: 0 | Refills: 0 | DISCHARGE
Start: 2024-10-14

## 2024-10-14 RX ORDER — OXYCODONE HYDROCHLORIDE 30 MG/1
1 TABLET, FILM COATED, EXTENDED RELEASE ORAL
Qty: 12 | Refills: 0
Start: 2024-10-14 | End: 2024-10-16

## 2024-10-14 RX ORDER — ACETAMINOPHEN 325 MG
3 TABLET ORAL
Qty: 36 | Refills: 0
Start: 2024-10-14 | End: 2024-10-16

## 2024-10-14 RX ADMIN — Medication 975 MILLIGRAM(S): at 12:50

## 2024-10-14 RX ADMIN — Medication 975 MILLIGRAM(S): at 06:43

## 2024-10-14 RX ADMIN — LIDOCAINE 1 PATCH: 50 CREAM TOPICAL at 10:20

## 2024-10-14 RX ADMIN — Medication 1 PACKET(S): at 12:18

## 2024-10-14 RX ADMIN — LIDOCAINE 1 PATCH: 50 CREAM TOPICAL at 10:21

## 2024-10-14 RX ADMIN — Medication 500 MILLIGRAM(S): at 12:18

## 2024-10-14 RX ADMIN — Medication 975 MILLIGRAM(S): at 12:18

## 2024-10-14 RX ADMIN — PREDNISONE 40 MILLIGRAM(S): 5 TABLET ORAL at 05:44

## 2024-10-14 RX ADMIN — Medication 975 MILLIGRAM(S): at 00:36

## 2024-10-14 RX ADMIN — Medication 975 MILLIGRAM(S): at 05:43

## 2024-10-14 RX ADMIN — Medication 500 MILLIGRAM(S): at 05:44

## 2024-10-14 NOTE — ADVANCED PRACTICE NURSE CONSULT - RECOMMEDATIONS
Emotional support and encouragement provided throughout visit & and discussed homecare to f/u upon d/c to reinforce teaching.   Will recommend:  1. Monitor output  2. Empty pouch when 1/3-1/2 full  3. Change pouching system every 3-4 days & prn leakage  4. Reinforce ostomy teaching w/patient  5. Contact CWOCNs if questions/issues arise.  6. Supplies: [place] 2 1/4" Ceraplus flat skin barrier (#11353), [place] 2 1/4" drainable pouch (#00841); Accessory products:  stoma paste #[id number], stoma powder (#9894) & Cavilon No sting barrier film wipe (#7808), stoma belt #[id number]  
Will recommend:  1. Ostomy teaching to f/u post-operatively  2. Review educational material provided  Discussed plan of care with pt & spouse and Dr. Lu.   
Will recommend:    1. Monitor output  2. Empty pouch when 1/3-1/2 full  3. Change pouching system every 3-4 days & prn leakage  4. Reinforce ostomy teaching w/patient  5. Contact CWOCNs if questions/issues arise.  6. Supplies: [place] 2 1/4" Ceraplus flat skin barrier (#18238), [place] 2 1/4" drainable pouch (#83259); Accessory products:  stoma paste #[id number], stoma powder (#8471) & Cavilon No sting barrier film wipe (#8637), stoma belt #7139

## 2024-10-14 NOTE — DISCHARGE NOTE PROVIDER - NSDCCPCAREPLAN_GEN_ALL_CORE_FT
PRINCIPAL DISCHARGE DIAGNOSIS  Diagnosis: Ulcerative colitis  Assessment and Plan of Treatment: Ostomy care per ostomy RN education.  BATHING: You may shower and/or sponge bathe 24 hours after surgery. Do not submerge the incision underwater for the next 2 weeks.  ACTIVITY: No heavy lifting anything more than 10-15lbs or straining. Otherwise, you may return to your usual level of physical activity. If you are taking narcotic pain medication (such as Oxycodone) do NOT drive a car, operate machinery or make important decisions.  DIET: Maintain Low Fiber Diet until your next appointment.  PAIN: A prescription for oxycodone has been sent to the pharmacy. You should only take these for severe pain. For mild or moderate pain, you may take 975mg of tylenol every 6 hours. Do not exceed more than 4G tylenol per day.   NOTIFY YOUR SURGEON IF: You have any bleeding that does not stop, any pus draining from your wound, any fever (over 100.4 F) or chills, persistent nausea/vomiting with inability to tolerate food or liquids, persistent diarrhea, or if your pain is not controlled on your discharge pain medications.  FOLLOW-UP:  1. Please call to make a follow-up appointment within two weeks of discharge with Dr. Lu  2. Please follow up with your primary care physician in one week regarding your hospitalization.

## 2024-10-14 NOTE — DISCHARGE NOTE PROVIDER - HOSPITAL COURSE
HPI: 34M PMH Ulcerative Colitis (diagnosed 2016 via c-scope), C.Diff (completed 10d Fidaxomicin course) presents as tx from [facility] where he was hospitalized for failure of medical management UC flare while on Stellara, now being considered for first stage J-pouch IPAA creation. At [facility], given pulse steroids Solumedrol (on oral 40mg taper at home) and seen by GI team who recommended surgical evaluation. Patient endorses he feels a little better and is able to eat. Has never had surgery before. Endorses his condition was limiting when he had flares as he would not be able to leave the house.  (10 Oct 2024 14:55)    Pt was admitted under [facility] for further evaluation and management. Pt was taken to the OR on 10/11/24, and is s/p total abdominal colectomy with end ileostomy. The patient tolerated the procedure well (see operative report for full details). Pt was transferred to the PACU in stable condition. In the PACU, the patient's pain was controlled and vitals stable. On POC, the patient was doing well. The patient was transferred to the surgical floor in stable condition.    On POD #1, pt was stable and doing well. Pt's Alfaro was discontinued on 10/12/24, and passed TOV. Once IV pain control dosing complete, pt was transitioned to oral Tylenol with Oxycodone for breakthrough pain. Diet was advanced as tolerated, and GI function returned via ostomy.    Pt to be discharged with ostomy; ostomy teaching done.  On the day of discharge, the patient's vitals are stable, pain is controlled, voiding urine, passing gas/stool via ostomy, tolerating a low fiber diet, and ambulating well. Pt will f/u with Dr. Lu in 2 weeks. Pt will f/u with PCP in 1-2 weeks.

## 2024-10-14 NOTE — DISCHARGE NOTE PROVIDER - CARE PROVIDERS DIRECT ADDRESSES
[email or web address]

## 2024-10-14 NOTE — DISCHARGE NOTE NURSING/CASE MANAGEMENT/SOCIAL WORK - NSDCPNINST_GEN_ALL_CORE
Prednisone instructions  Take 40 mg (4 tablets of 10mg) daily until thursday 10/17  On friday (10/18) - start taking 30mg daily for 7 days  20mg daily for 7 days  10mg daily for 7 days  5 mg daily for 7 days

## 2024-10-14 NOTE — ADVANCED PRACTICE NURSE CONSULT - REASON FOR CONSULT
Preop stoma marking & education
Discharge planning  Practice opening/closing and draining the pouch.    HPI as noted: 34years old male with  PMH Ulcerative Colitis (diagnosed 2016 via c-scope), C.Diff (completed 10d Fidaxomicin course) presents as tx from [facility] where he was hospitalized for failure of medical management UC flare while on Stellara, now being considered for first stage J-pouch IPAA creation.   S/P  Lap TAC w/ end ileostomy - 10/11/2024
Continue ostomy education  Complete pouching system    S/p Laparoscopic Total abdominal colectomy with end ileostomy on 10/11/24

## 2024-10-14 NOTE — PROGRESS NOTE ADULT - SUBJECTIVE AND OBJECTIVE BOX
SURGERY DAILY PROGRESS NOTE     Patient is a 34y old  Male who presents with a chief complaint of     OVERNIGHT EVENTS: NAEON,       SUBJECTIVE:   Patient seen and examined at bedside with surgical team, patient without complaints. neftali LRD without n/v. Having gas and stool output in ostomy. Ambulating without difficulty.       OBJECTIVE     Vital Signs Last 24 Hrs  T(C): 37.1 (13 Oct 2024 12:06), Max: 37.3 (13 Oct 2024 07:46)  T(F): 98.8 (13 Oct 2024 12:06), Max: 99.1 (13 Oct 2024 07:46)  HR: 75 (13 Oct 2024 12:06) (74 - 95)  BP: 131/84 (13 Oct 2024 12:06) (118/78 - 137/78)  BP(mean): --  RR: 18 (13 Oct 2024 12:06) (18 - 18)  SpO2: 97% (13 Oct 2024 12:06) (96% - 99%)    Parameters below as of 13 Oct 2024 12:06  Patient On (Oxygen Delivery Method): room air    I&O's Detail    12 Oct 2024 07:01  -  13 Oct 2024 07:00  --------------------------------------------------------  IN:    Lactated Ringers: 2300 mL    Oral Fluid: 1800 mL  Total IN: 4100 mL    OUT:    Ileostomy (mL): 450 mL    Indwelling Catheter - Urethral (mL): 600 mL    Voided (mL): 3510 mL  Total OUT: 4560 mL    Total NET: -460 mL      13 Oct 2024 07:01  -  13 Oct 2024 12:51  --------------------------------------------------------  IN:  Total IN: 0 mL    OUT:    Ileostomy (mL): 75 mL    Voided (mL): 2700 mL  Total OUT: 2775 mL    Total NET: -2775 mL          Physical Exam  Constitutional: A&Ox3, NAD  Respiratory: Breathing comfortably on RA  Gastrointestinal: Soft, appropriately tender, nondistended. Incision c/d/i. Ileostomy PPP with stool and gas in bag.   Extremities: Moving all extremities, no edema    Medications  MEDICATIONS  (STANDING):  acetaminophen     Tablet .. 975 milliGRAM(s) Oral every 6 hours  enoxaparin Injectable 40 milliGRAM(s) SubCutaneous every 24 hours  influenza   Vaccine 0.5 milliLiter(s) IntraMuscular once  lactated ringers. 1000 milliLiter(s) (100 mL/Hr) IV Continuous <Continuous>  methocarbamol 500 milliGRAM(s) Oral every 6 hours  predniSONE   Tablet 40 milliGRAM(s) Oral daily    MEDICATIONS  (PRN):  HYDROmorphone  Injectable 1 milliGRAM(s) IV Push every 3 hours PRN Severe Pain (7 - 10)  nalbuphine Injectable 2.5 milliGRAM(s) IV Push every 6 hours PRN Pruritus  naloxone Injectable 0.1 milliGRAM(s) IV Push every 3 minutes PRN For ANY of the following changes in patient status:  A. RR LESS THAN 10 breaths per minute, B. Oxygen saturation LESS THAN 90%, C. Sedation score of 6  ondansetron Injectable 4 milliGRAM(s) IV Push every 6 hours PRN Nausea  oxyCODONE    IR 10 milliGRAM(s) Oral every 3 hours PRN Moderate Pain (4 - 6)  oxyCODONE    IR 5 milliGRAM(s) Oral every 3 hours PRN Mild Pain (1 - 3)        LABS:                        10.8   15.55 )-----------( 366      ( 13 Oct 2024 07:12 )             33.7     10-13    134[L]  |  101  |  4[L]  ----------------------------<  90  3.4[L]   |  26  |  0.68    Ca    8.0[L]      13 Oct 2024 07:12  Phos  2.3     10-13  Mg     2.0     10-13          Urinalysis Basic - ( 13 Oct 2024 07:12 )    Color: x / Appearance: x / SG: x / pH: x  Gluc: 90 mg/dL / Ketone: x  / Bili: x / Urobili: x   Blood: x / Protein: x / Nitrite: x   Leuk Esterase: x / RBC: x / WBC x   Sq Epi: x / Non Sq Epi: x / Bacteria: x      
Day 1 of Anesthesia Pain Management Service    SUBJECTIVE:  Pain Scale Score:          [X] Refer to charted pain scores    THERAPY: Received PF spinal morphine as above    OBJECTIVE:    Sedation:        	[X] Alert	[ ] Drowsy	[ ] Arousable      [ ] Asleep       [ ] Unresponsive    Side Effects:	[X] None	[ ] Nausea	[ ] Vomiting         [ ] Pruritus  		[ ] Weakness            [ ] Numbness	          [ ] Other:    ASSESSMENT/ PLAN  [X] Patient transitioned to prn analgesics  [X] Pain management per primary service, pain service to sign off   [X]Documentation and Verification of current medications
SUBJECTIVE:  Patient seen and examined at bedside.  Reports pain is controlled.   Denies nausea, vomiting  Ostomy is pink and productive  Tolerating clears  Ambulating independently    OBJECTIVE:  Vital Signs Last 24 Hrs  T(C): 36.7 (12 Oct 2024 17:04), Max: 36.9 (12 Oct 2024 05:29)  T(F): 98 (12 Oct 2024 17:04), Max: 98.5 (12 Oct 2024 13:22)  HR: 83 (12 Oct 2024 17:04) (75 - 95)  BP: 124/81 (12 Oct 2024 17:04) (110/71 - 124/81)  BP(mean): --  RR: 18 (12 Oct 2024 17:04) (18 - 18)  SpO2: 96% (12 Oct 2024 17:04) (96% - 98%)    Parameters below as of 12 Oct 2024 17:04  Patient On (Oxygen Delivery Method): room air        Physical Examination:  GEN: NAD, resting quietly  NEURO: AAOx3, CN II-XII grossly intact, no focal deficits  PULM: symmetric chest rise bilaterally, no increased WOB  ABD: soft, nontender, nondistended, incision/port sites CDI, ostomy pink and productive  EXTR: no lower extremity edema, moving all extremities  
SURGERY DAILY PROGRESS NOTE     Patient is a 34y old  Male who presents with a chief complaint of     OVERNIGHT EVENTS: NAEON, passed TOV yesterday       SUBJECTIVE:   Patient seen and examined at bedside with surgical team, patient without complaints. States he is feeling well. Has some abdominal soreness but tolerable with pain meds. Tolerating CLD without n/v. Having gas and stool output in ostomy. Ambulating without difficulty.       OBJECTIVE     Vital Signs Last 24 Hrs  T(C): 37.1 (13 Oct 2024 12:06), Max: 37.3 (13 Oct 2024 07:46)  T(F): 98.8 (13 Oct 2024 12:06), Max: 99.1 (13 Oct 2024 07:46)  HR: 75 (13 Oct 2024 12:06) (74 - 95)  BP: 131/84 (13 Oct 2024 12:06) (118/78 - 137/78)  BP(mean): --  RR: 18 (13 Oct 2024 12:06) (18 - 18)  SpO2: 97% (13 Oct 2024 12:06) (96% - 99%)    Parameters below as of 13 Oct 2024 12:06  Patient On (Oxygen Delivery Method): room air    I&O's Detail    12 Oct 2024 07:01  -  13 Oct 2024 07:00  --------------------------------------------------------  IN:    Lactated Ringers: 2300 mL    Oral Fluid: 1800 mL  Total IN: 4100 mL    OUT:    Ileostomy (mL): 450 mL    Indwelling Catheter - Urethral (mL): 600 mL    Voided (mL): 3510 mL  Total OUT: 4560 mL    Total NET: -460 mL      13 Oct 2024 07:01  -  13 Oct 2024 12:51  --------------------------------------------------------  IN:  Total IN: 0 mL    OUT:    Ileostomy (mL): 75 mL    Voided (mL): 2700 mL  Total OUT: 2775 mL    Total NET: -2775 mL          Physical Exam  Constitutional: A&Ox3, NAD  Respiratory: Breathing comfortably on RA  Gastrointestinal: Soft, appropriately tender, nondistended. Incision c/d/i. Ileostomy PPP with stool and gas in bag.   Extremities: Moving all extremities, no edema    Medications  MEDICATIONS  (STANDING):  acetaminophen     Tablet .. 975 milliGRAM(s) Oral every 6 hours  enoxaparin Injectable 40 milliGRAM(s) SubCutaneous every 24 hours  influenza   Vaccine 0.5 milliLiter(s) IntraMuscular once  lactated ringers. 1000 milliLiter(s) (100 mL/Hr) IV Continuous <Continuous>  methocarbamol 500 milliGRAM(s) Oral every 6 hours  predniSONE   Tablet 40 milliGRAM(s) Oral daily    MEDICATIONS  (PRN):  HYDROmorphone  Injectable 1 milliGRAM(s) IV Push every 3 hours PRN Severe Pain (7 - 10)  nalbuphine Injectable 2.5 milliGRAM(s) IV Push every 6 hours PRN Pruritus  naloxone Injectable 0.1 milliGRAM(s) IV Push every 3 minutes PRN For ANY of the following changes in patient status:  A. RR LESS THAN 10 breaths per minute, B. Oxygen saturation LESS THAN 90%, C. Sedation score of 6  ondansetron Injectable 4 milliGRAM(s) IV Push every 6 hours PRN Nausea  oxyCODONE    IR 10 milliGRAM(s) Oral every 3 hours PRN Moderate Pain (4 - 6)  oxyCODONE    IR 5 milliGRAM(s) Oral every 3 hours PRN Mild Pain (1 - 3)        LABS:                        10.8   15.55 )-----------( 366      ( 13 Oct 2024 07:12 )             33.7     10-13    134[L]  |  101  |  4[L]  ----------------------------<  90  3.4[L]   |  26  |  0.68    Ca    8.0[L]      13 Oct 2024 07:12  Phos  2.3     10-13  Mg     2.0     10-13          Urinalysis Basic - ( 13 Oct 2024 07:12 )    Color: x / Appearance: x / SG: x / pH: x  Gluc: 90 mg/dL / Ketone: x  / Bili: x / Urobili: x   Blood: x / Protein: x / Nitrite: x   Leuk Esterase: x / RBC: x / WBC x   Sq Epi: x / Non Sq Epi: x / Bacteria: x

## 2024-10-14 NOTE — DISCHARGE NOTE NURSING/CASE MANAGEMENT/SOCIAL WORK - PATIENT PORTAL LINK FT
You can access the FollowMyHealth Patient Portal offered by [facility] by registering at the following website: [email or web address]. By joining [facility]’s FollowMyHealth portal, you will also be able to view your health information using other applications (apps) compatible with our system.

## 2024-10-14 NOTE — ADVANCED PRACTICE NURSE CONSULT - ASSESSMENT
In to see pt as requested by Dr. Lu & surgical resident for pre-op stoma marking & education. Chart reviewed & events noted. Pt in bed, awake & alert no c/o discomfort. Introduced self & purpose of visit. Pt scheduled for surgery today & verbalized undertanding planned procedure including "the bag". Pt's family at bedside (spouse, & parents) and supportive. Pt expressed feeling nervous & asked questions but is motivated to learn & looking forward to a better quality of life. Emotional support provided as patient verbalized his concerns & fears. Reviewed anatomy, function & basics ostomy care. Discussed returning to "normalcy" with stoma creation (i.e. clothing, work, traveling, hobbies, showering, etc).  Demonstrated pouching system & discussed steps for emptying. Pt practiced readily & receptive to teaching, and asked appropriate questions & answered all questions within my scope of practice.  Explained purpose & procedure of stoma marking. Pt verbalized understanding & agreed to the marking. Abdominal contours evaluated in lying, sitting, standing & bending positions. Rectus borders located. Stoma marking placed in both RLQ using indelible ink & covered w/Tegaderm. Pt able to see marking in sitting, lying & standing positions. Ileostomy educational materials placed at bedside. Will follow post op to continue ostomy teaching. Much support & encouragement provided throughout lesson.  
Chart reviewed and events noted.  In to see the patient for practicing opening/draining and closing the pouching system. Plan for discharge home today.  Pt's father at bedside,  Introduced self, role and the reason of the visit.  Patient receptive of the ostomy RN and appears motivated to practice with ostomy care.   Explained steps of ostomy care prior to any intervention.   Patient states, he has been emptying the pouch with the staff.   Pt verbalized understanding & agreed to demonstrate and practice opening/closing and draining the Ileostomy to promote independence.  On Exam:  abdomen  soft and slight tender to touch.  Stoma pink and viable, budded with liquid to pasty yellow output noted in the pouch.  Educated patient to observe for color of the stoma and the color/consistency and amount of the output.  Patient aware that pouch may need to be emptied 4-6 times a day.  Encouraged patient to empty RLQ  Ileostomy once its 1/3 - 1/2 full and able to drain the pouch independently with ostomy RN at the site as a standby.  Patient asked pertinent questions intermittently regarding hygiene and care at home.  All concerns addressed as needed.  Patient reports he is concerned about the weight of drainage in the pouch once half full.  Recommended use of stoma belt to secure Ileostomy' s placement.   Will follow up later in the day prior to discharge to continue ostomy teaching.  Emotional support & encouragement provided throughout the visit.   
Consult received, chart reviewed & events noted to date.In to see pt to continue ileostomy teaching & assess readiness to learn. Pt seen on 3 separate lessons today as d/c planning for home. Pt known to Ostomy RNs from preop stoma marking/education on Friday morning prior to OR. Re-introduced selves & role of CWOCN. Pt alert & receptive to teaching & motivated. Pt's spouse at bedside & supportive. CWOCN provided much emotional support, active listening, & assured pt/spouse of ongoing support & teaching. D/c planning to home today. Pt "feeling good & wants to go home". Pt motivated to learn ostomy care & participated in all as aspects of care with spouse at bedside (extremely supportive & willing to assist with care as needed).    Reviewed anatomy & function & basics of ileostomy care & returning to "normalcy" w/new stoma creation (ie showering, clothing, hobbies, work, etc.). Discussed expected return of bowel function. End ileostomy red & viable; pouch seal intact. + air in pouch w/oglesby brown liquid stool noted. Demonstrated pouching system & reveiwed steps for emptying. Pt observed & asked appropriate questions. Pt returned demonstration w/open & closure of pouching system,  "burping" pouch & emptying pouch & did well. THis CWOCN provided tips/tricks for emptying.  Complete pouching system change: loop ileostomy, RLQ, 1 3/8" pink, edematous & viable. Peristomal skin & mucocutaneous junction intact. Stoma budded, approximately 1.5". Pt observed removal of pouching system using pull and push technique, observed CWOCN cleansing skin with water, patting dry. Repouched w/Mary Ann 2 1/4" flat skin barrier, stoma paste to opening at back of skin barrier, & drainable pouch. Stoma belt applied for extra support at 3 & 9o'clock (pt preference).  Taught pt how to properly assess stoma viability and peristomal skin. Patient participated in stoma measurement, observing steps for creating template, cutting wafer, applying stoma paste & applying pouch. Discussed with patient that stoma will decrease in size over next 6-8 weeks & to monitor for leakage/stool undermining under skin barrier.  Reviewed that ileostomy will be getting "smaller/less edematous" over next 6-8 weeks and will be important to measure stoma at least weekly. Pt did well, but overwhelmed with steps for complete pouching system change. Support & encouragement provided throughout visit & discussed home care to f/u for reinforcement in ostomy education.   Discussed & reviewed dietary progression, cautions & ileostomy dietary modifications including importance of hydration, chewing food thoroughly, s/s of blockage & how to treat if symptoms arise, & foods to help thicken output. Pt reviewed "typical diet" & Pt  & spouse appreciative of time/support.  Discussed & reviewed ostomy discharge instructions regarding ostomy care, potential complications to report & seek help from the healthcare team (surgeon/CWOCN), & transitioning to home care. Discussed & reviewed product #s, supply info, ordering process & support group info. Discussed returning to "normalcy", showering, activity, clothing, etc. Encourage pt to continue performing self-care w/assist as needed by staff. Staff to reinforce teaching.  Pt & staff aware of plan of care. Supplies/pattern provided along w/reviewed educational material at bedside.    Pt instructed to measure& record 24 hr stoma output if notes increasing becoming more frequent. Report to surgeon if > 1500cc or < 400cc/ 24hr. Container to measure, chart to record  daily output & written instructions provided. Report  to MD per protocol. Discussed to monitor bowel function (listen/look for flatus, & stool function in pouch). Pt denies N/V & tolerating diet. Discussed with pt to monitor intake, progress slowly & stop when full- discussed chewing food thoroughly & importance of hydration. Ostomy RNs remain available for f/u. Support & encouragement provided throughout visits.

## 2024-10-14 NOTE — DISCHARGE NOTE NURSING/CASE MANAGEMENT/SOCIAL WORK - NSDCPEFALRISK_GEN_ALL_CORE
For information on Fall & Injury Prevention, visit: [email or web address] OR  [email or web address] OR  https://www.cdc.gov/steadi/patient.html

## 2024-10-14 NOTE — DISCHARGE NOTE PROVIDER - NSDCFUADDAPPT_GEN_ALL_CORE_FT
Please follow up with your primary care provider in 1-2 weeks after discharge.    Please followup with Dr. Lu in 2 weeks in office.

## 2024-10-14 NOTE — DISCHARGE NOTE PROVIDER - NSDCMRMEDTOKEN_GEN_ALL_CORE_FT
acetaminophen 325 mg oral tablet: 3 tab(s) orally every 6 hours as needed for  moderate pain MDD: 4grams  lidocaine 4% topical film: Apply topically to affected area once a day as needed for  moderate pain Please apply and keep on patch for 12 hours and then keep off for 12 hours.  methocarbamol 500 mg oral tablet: 1 tab(s) orally every 6 hours as needed for  moderate pain MDD: 4 tabs  Narcan 4 mg/0.1 mL nasal spray: 4 milligram(s) intranasally every 2 to 3 minutes as needed for narcotic overdose Intranasally once a day as needed for drug overdose. 1 spray into alternating nostrils every 2-3 minutes until EMS arrives.  oxyCODONE 5 mg oral tablet: 1 tab(s) orally every 6 hours as needed for  severe pain MDD: 4 tabs  predniSONE 10 mg oral tablet: 3 tab(s) orally once a day  predniSONE 10 mg oral tablet: 1 tab(s) orally once a day  predniSONE 10 mg oral tablet: 0.5 tab(s) orally once a day  predniSONE 20 mg oral tablet: 1 tab(s) orally once a day  predniSONE 20 mg oral tablet: 2 tab(s) orally once a day Last day 10/17

## 2024-10-14 NOTE — DISCHARGE NOTE PROVIDER - CARE PROVIDER_API CALL
Karli Lu  Colon/Rectal Surgery  [address] [place] [id number]  Phone: [phone]  Fax: [phone]  Follow Up Time: 2 weeks

## 2024-10-15 RX ORDER — PREDNISONE 5 MG/1
2 TABLET ORAL
Qty: 6 | Refills: 0
Start: 2024-10-15 | End: 2024-10-17

## 2024-10-15 RX ORDER — PREDNISONE 5 MG/1
1 TABLET ORAL
Qty: 58 | Refills: 0
Start: 2024-10-15

## 2024-10-16 PROBLEM — Z86.19 PERSONAL HISTORY OF OTHER INFECTIOUS AND PARASITIC DISEASES: Chronic | Status: ACTIVE | Noted: 2024-10-10

## 2024-10-18 RX ORDER — PREDNISONE 5 MG/1
3 TABLET ORAL
Qty: 21 | Refills: 0
Start: 2024-10-18 | End: 2024-10-24

## 2024-10-21 ENCOUNTER — NON-APPOINTMENT (OUTPATIENT)
Age: 34
End: 2024-10-21

## 2024-10-21 LAB
ANION GAP SERPL CALC-SCNC: 8 MMOL/L
BASOPHILS # BLD AUTO: 0.05 K/UL
BASOPHILS NFR BLD AUTO: 0.3 %
BUN SERPL-MCNC: 6 MG/DL
CALCIUM SERPL-MCNC: 8.6 MG/DL
CHLORIDE SERPL-SCNC: 101 MMOL/L
CO2 SERPL-SCNC: 28 MMOL/L
CREAT SERPL-MCNC: 0.68 MG/DL
EGFR: 127 ML/MIN/1.73M2
EOSINOPHIL # BLD AUTO: 0.07 K/UL
EOSINOPHIL NFR BLD AUTO: 0.4 %
GLUCOSE SERPL-MCNC: 140 MG/DL
HCT VFR BLD CALC: 35.9 %
HGB BLD-MCNC: 12.4 G/DL
IMM GRANULOCYTES NFR BLD AUTO: 0.5 %
LYMPHOCYTES # BLD AUTO: 3.09 K/UL
LYMPHOCYTES NFR BLD AUTO: 17.5 %
MAN DIFF?: NORMAL
MCHC RBC-ENTMCNC: 27.6 PG
MCHC RBC-ENTMCNC: 34.5 GM/DL
MCV RBC AUTO: 80 FL
MONOCYTES # BLD AUTO: 1.22 K/UL
MONOCYTES NFR BLD AUTO: 6.9 %
NEUTROPHILS # BLD AUTO: 13.14 K/UL
NEUTROPHILS NFR BLD AUTO: 74.4 %
PLATELET # BLD AUTO: 424 K/UL
POTASSIUM SERPL-SCNC: 3.9 MMOL/L
RBC # BLD: 4.49 M/UL
RBC # FLD: 13.6 %
SODIUM SERPL-SCNC: 137 MMOL/L
SURGICAL PATHOLOGY STUDY: SIGNIFICANT CHANGE UP
WBC # FLD AUTO: 17.65 K/UL

## 2024-10-24 ENCOUNTER — APPOINTMENT (OUTPATIENT)
Dept: COLORECTAL SURGERY | Facility: CLINIC | Age: 34
End: 2024-10-24

## 2024-10-24 VITALS
SYSTOLIC BLOOD PRESSURE: 115 MMHG | HEIGHT: 66 IN | BODY MASS INDEX: 27 KG/M2 | HEART RATE: 110 BPM | WEIGHT: 168 LBS | OXYGEN SATURATION: 98 % | DIASTOLIC BLOOD PRESSURE: 74 MMHG

## 2024-10-24 PROCEDURE — 99024 POSTOP FOLLOW-UP VISIT: CPT

## 2024-10-25 RX ORDER — PREDNISONE 5 MG/1
1 TABLET ORAL
Qty: 7 | Refills: 0
Start: 2024-10-25 | End: 2024-10-31

## 2024-10-28 ENCOUNTER — NON-APPOINTMENT (OUTPATIENT)
Age: 34
End: 2024-10-28

## 2024-10-29 ENCOUNTER — APPOINTMENT (OUTPATIENT)
Dept: COLORECTAL SURGERY | Facility: CLINIC | Age: 34
End: 2024-10-29

## 2024-11-01 ENCOUNTER — NON-APPOINTMENT (OUTPATIENT)
Age: 34
End: 2024-11-01

## 2024-11-01 RX ORDER — PREDNISONE 5 MG/1
1 TABLET ORAL
Qty: 7 | Refills: 0
Start: 2024-11-01 | End: 2024-11-07

## 2024-11-02 ENCOUNTER — NON-APPOINTMENT (OUTPATIENT)
Age: 34
End: 2024-11-02

## 2024-11-07 ENCOUNTER — APPOINTMENT (OUTPATIENT)
Dept: COLORECTAL SURGERY | Facility: CLINIC | Age: 34
End: 2024-11-07
Payer: COMMERCIAL

## 2024-11-07 VITALS
WEIGHT: 168 LBS | HEART RATE: 101 BPM | DIASTOLIC BLOOD PRESSURE: 75 MMHG | HEIGHT: 66 IN | BODY MASS INDEX: 27 KG/M2 | SYSTOLIC BLOOD PRESSURE: 117 MMHG | OXYGEN SATURATION: 99 %

## 2024-11-07 PROCEDURE — 99024 POSTOP FOLLOW-UP VISIT: CPT

## 2024-11-08 RX ORDER — PREDNISONE 5 MG/1
0.5 TABLET ORAL
Qty: 3.5 | Refills: 0
Start: 2024-11-08 | End: 2024-11-14

## 2024-11-12 ENCOUNTER — NON-APPOINTMENT (OUTPATIENT)
Age: 34
End: 2024-11-12

## 2024-11-13 ENCOUNTER — NON-APPOINTMENT (OUTPATIENT)
Age: 34
End: 2024-11-13

## 2024-11-18 ENCOUNTER — APPOINTMENT (OUTPATIENT)
Dept: COLORECTAL SURGERY | Facility: CLINIC | Age: 34
End: 2024-11-18

## 2024-11-18 ENCOUNTER — APPOINTMENT (OUTPATIENT)
Dept: GASTROENTEROLOGY | Facility: CLINIC | Age: 34
End: 2024-11-18

## 2024-11-18 VITALS
DIASTOLIC BLOOD PRESSURE: 80 MMHG | OXYGEN SATURATION: 99 % | BODY MASS INDEX: 26.84 KG/M2 | HEIGHT: 66 IN | SYSTOLIC BLOOD PRESSURE: 124 MMHG | WEIGHT: 167 LBS | HEART RATE: 98 BPM

## 2024-12-05 ENCOUNTER — APPOINTMENT (OUTPATIENT)
Dept: COLORECTAL SURGERY | Facility: CLINIC | Age: 34
End: 2024-12-05

## 2024-12-05 VITALS
WEIGHT: 169 LBS | OXYGEN SATURATION: 98 % | HEART RATE: 82 BPM | HEIGHT: 66 IN | SYSTOLIC BLOOD PRESSURE: 122 MMHG | DIASTOLIC BLOOD PRESSURE: 74 MMHG | BODY MASS INDEX: 27.16 KG/M2

## 2024-12-11 ENCOUNTER — APPOINTMENT (OUTPATIENT)
Dept: COLORECTAL SURGERY | Facility: CLINIC | Age: 34
End: 2024-12-11
Payer: COMMERCIAL

## 2024-12-11 VITALS
WEIGHT: 175.38 LBS | OXYGEN SATURATION: 98 % | DIASTOLIC BLOOD PRESSURE: 81 MMHG | SYSTOLIC BLOOD PRESSURE: 124 MMHG | BODY MASS INDEX: 28.19 KG/M2 | HEART RATE: 94 BPM | HEIGHT: 66 IN

## 2024-12-11 DIAGNOSIS — Z93.2 ILEOSTOMY STATUS: ICD-10-CM

## 2024-12-11 PROCEDURE — 99024 POSTOP FOLLOW-UP VISIT: CPT

## 2024-12-11 RX ORDER — HYDROCORTISONE ACETATE 25 MG/1
25 SUPPOSITORY RECTAL
Qty: 24 | Refills: 2 | Status: ACTIVE | COMMUNITY
Start: 2024-12-11 | End: 1900-01-01

## 2024-12-13 RX ORDER — BUDESONIDE 28 MG/1
2 AEROSOL, FOAM RECTAL
Qty: 1 | Refills: 1 | Status: ACTIVE | COMMUNITY
Start: 2024-12-13 | End: 1900-01-01

## 2025-01-24 ENCOUNTER — APPOINTMENT (OUTPATIENT)
Dept: GASTROENTEROLOGY | Facility: CLINIC | Age: 35
End: 2025-01-24
Payer: COMMERCIAL

## 2025-01-24 VITALS
WEIGHT: 171 LBS | SYSTOLIC BLOOD PRESSURE: 129 MMHG | BODY MASS INDEX: 27.48 KG/M2 | DIASTOLIC BLOOD PRESSURE: 82 MMHG | RESPIRATION RATE: 16 BRPM | OXYGEN SATURATION: 99 % | HEART RATE: 95 BPM | HEIGHT: 66 IN

## 2025-01-24 DIAGNOSIS — K51.90 ULCERATIVE COLITIS, UNSPECIFIED, W/OUT COMPLICATIONS: ICD-10-CM

## 2025-01-24 PROCEDURE — 99213 OFFICE O/P EST LOW 20 MIN: CPT

## 2025-01-24 PROCEDURE — ZZZZZ: CPT

## 2025-01-25 LAB
ALBUMIN SERPL ELPH-MCNC: 4.6 G/DL
ALP BLD-CCNC: 71 U/L
ALT SERPL-CCNC: 21 U/L
ANION GAP SERPL CALC-SCNC: 11 MMOL/L
AST SERPL-CCNC: 28 U/L
BILIRUB SERPL-MCNC: 0.4 MG/DL
BUN SERPL-MCNC: 9 MG/DL
CALCIUM SERPL-MCNC: 10 MG/DL
CHLORIDE SERPL-SCNC: 103 MMOL/L
CO2 SERPL-SCNC: 26 MMOL/L
CREAT SERPL-MCNC: 0.8 MG/DL
EGFR: 121 ML/MIN/1.73M2
HCT VFR BLD CALC: 49.2 %
HGB BLD-MCNC: 15 G/DL
MCHC RBC-ENTMCNC: 24.8 PG
MCHC RBC-ENTMCNC: 30.5 G/DL
MCV RBC AUTO: 81.5 FL
PLATELET # BLD AUTO: 418 K/UL
POTASSIUM SERPL-SCNC: 5 MMOL/L
PROT SERPL-MCNC: 9 G/DL
RBC # BLD: 6.04 M/UL
RBC # FLD: 15.6 %
SODIUM SERPL-SCNC: 140 MMOL/L
WBC # FLD AUTO: 9.93 K/UL

## 2025-01-27 ENCOUNTER — TRANSCRIPTION ENCOUNTER (OUTPATIENT)
Age: 35
End: 2025-01-27

## 2025-01-30 ENCOUNTER — INPATIENT (INPATIENT)
Facility: HOSPITAL | Age: 35
LOS: 0 days | Discharge: HOME | DRG: 390 | End: 2025-01-31
Payer: COMMERCIAL

## 2025-01-30 ENCOUNTER — NON-APPOINTMENT (OUTPATIENT)
Age: 35
End: 2025-01-30

## 2025-01-30 VITALS
SYSTOLIC BLOOD PRESSURE: 133 MMHG | DIASTOLIC BLOOD PRESSURE: 84 MMHG | WEIGHT: 169.98 LBS | OXYGEN SATURATION: 99 % | HEART RATE: 102 BPM | HEIGHT: 66 IN | TEMPERATURE: 97 F | RESPIRATION RATE: 20 BRPM

## 2025-01-30 LAB
ALBUMIN SERPL ELPH-MCNC: 4.6 G/DL — SIGNIFICANT CHANGE UP (ref 3.3–5)
ALP SERPL-CCNC: 65 U/L — SIGNIFICANT CHANGE UP (ref 40–120)
ALT FLD-CCNC: 28 U/L — SIGNIFICANT CHANGE UP (ref 10–45)
ANION GAP SERPL CALC-SCNC: 14 MMOL/L — SIGNIFICANT CHANGE UP (ref 5–17)
APPEARANCE UR: CLEAR — SIGNIFICANT CHANGE UP
APTT BLD: 33.5 SEC — SIGNIFICANT CHANGE UP (ref 24.5–35.6)
AST SERPL-CCNC: 39 U/L — SIGNIFICANT CHANGE UP (ref 10–40)
BASOPHILS # BLD AUTO: 0.06 K/UL — SIGNIFICANT CHANGE UP (ref 0–0.2)
BASOPHILS NFR BLD AUTO: 0.5 % — SIGNIFICANT CHANGE UP (ref 0–2)
BILIRUB SERPL-MCNC: 0.7 MG/DL — SIGNIFICANT CHANGE UP (ref 0.2–1.2)
BILIRUB UR-MCNC: NEGATIVE — SIGNIFICANT CHANGE UP
BUN SERPL-MCNC: <4 MG/DL — LOW (ref 7–23)
CALCIUM SERPL-MCNC: 9.9 MG/DL — SIGNIFICANT CHANGE UP (ref 8.4–10.5)
CHLORIDE SERPL-SCNC: 99 MMOL/L — SIGNIFICANT CHANGE UP (ref 96–108)
CO2 SERPL-SCNC: 22 MMOL/L — SIGNIFICANT CHANGE UP (ref 22–31)
COLOR SPEC: YELLOW — SIGNIFICANT CHANGE UP
CREAT SERPL-MCNC: 0.86 MG/DL — SIGNIFICANT CHANGE UP (ref 0.5–1.3)
DIFF PNL FLD: NEGATIVE — SIGNIFICANT CHANGE UP
EGFR: 118 ML/MIN/1.73M2 — SIGNIFICANT CHANGE UP
EOSINOPHIL # BLD AUTO: 0.46 K/UL — SIGNIFICANT CHANGE UP (ref 0–0.5)
EOSINOPHIL NFR BLD AUTO: 3.6 % — SIGNIFICANT CHANGE UP (ref 0–6)
GAS PNL BLDV: SIGNIFICANT CHANGE UP
GLUCOSE SERPL-MCNC: 93 MG/DL — SIGNIFICANT CHANGE UP (ref 70–99)
GLUCOSE UR QL: NEGATIVE MG/DL — SIGNIFICANT CHANGE UP
HCT VFR BLD CALC: 45.1 % — SIGNIFICANT CHANGE UP (ref 39–50)
HGB BLD-MCNC: 14.3 G/DL — SIGNIFICANT CHANGE UP (ref 13–17)
IMM GRANULOCYTES NFR BLD AUTO: 0.3 % — SIGNIFICANT CHANGE UP (ref 0–0.9)
INR BLD: 1.12 RATIO — SIGNIFICANT CHANGE UP (ref 0.85–1.16)
KETONES UR-MCNC: NEGATIVE MG/DL — SIGNIFICANT CHANGE UP
LEUKOCYTE ESTERASE UR-ACNC: NEGATIVE — SIGNIFICANT CHANGE UP
LIDOCAIN IGE QN: 25 U/L — SIGNIFICANT CHANGE UP (ref 7–60)
LYMPHOCYTES # BLD AUTO: 32 % — SIGNIFICANT CHANGE UP (ref 13–44)
LYMPHOCYTES # BLD AUTO: 4.05 K/UL — HIGH (ref 1–3.3)
MCHC RBC-ENTMCNC: 24.7 PG — LOW (ref 27–34)
MCHC RBC-ENTMCNC: 31.7 G/DL — LOW (ref 32–36)
MCV RBC AUTO: 77.8 FL — LOW (ref 80–100)
MONOCYTES # BLD AUTO: 1.34 K/UL — HIGH (ref 0–0.9)
MONOCYTES NFR BLD AUTO: 10.6 % — SIGNIFICANT CHANGE UP (ref 2–14)
NEUTROPHILS # BLD AUTO: 6.72 K/UL — SIGNIFICANT CHANGE UP (ref 1.8–7.4)
NEUTROPHILS NFR BLD AUTO: 53 % — SIGNIFICANT CHANGE UP (ref 43–77)
NITRITE UR-MCNC: NEGATIVE — SIGNIFICANT CHANGE UP
NRBC # BLD: 0 /100 WBCS — SIGNIFICANT CHANGE UP (ref 0–0)
NRBC BLD-RTO: 0 /100 WBCS — SIGNIFICANT CHANGE UP (ref 0–0)
PH UR: 6.5 — SIGNIFICANT CHANGE UP (ref 5–8)
PLATELET # BLD AUTO: 353 K/UL — SIGNIFICANT CHANGE UP (ref 150–400)
POTASSIUM SERPL-MCNC: 3.9 MMOL/L — SIGNIFICANT CHANGE UP (ref 3.5–5.3)
POTASSIUM SERPL-SCNC: 3.9 MMOL/L — SIGNIFICANT CHANGE UP (ref 3.5–5.3)
PROT SERPL-MCNC: 8.5 G/DL — HIGH (ref 6–8.3)
PROT UR-MCNC: NEGATIVE MG/DL — SIGNIFICANT CHANGE UP
PROTHROM AB SERPL-ACNC: 12.8 SEC — SIGNIFICANT CHANGE UP (ref 9.9–13.4)
RBC # BLD: 5.8 M/UL — SIGNIFICANT CHANGE UP (ref 4.2–5.8)
RBC # FLD: 14.6 % — HIGH (ref 10.3–14.5)
SODIUM SERPL-SCNC: 135 MMOL/L — SIGNIFICANT CHANGE UP (ref 135–145)
SP GR SPEC: <1.005 — LOW (ref 1–1.03)
UROBILINOGEN FLD QL: 0.2 MG/DL — SIGNIFICANT CHANGE UP (ref 0.2–1)
WBC # BLD: 12.67 K/UL — HIGH (ref 3.8–10.5)
WBC # FLD AUTO: 12.67 K/UL — HIGH (ref 3.8–10.5)

## 2025-01-30 PROCEDURE — 99285 EMERGENCY DEPT VISIT HI MDM: CPT

## 2025-01-30 RX ORDER — ACETAMINOPHEN 160 MG/5ML
1000 SUSPENSION ORAL ONCE
Refills: 0 | Status: COMPLETED | OUTPATIENT
Start: 2025-01-30 | End: 2025-01-31

## 2025-01-30 RX ORDER — ONDANSETRON 4 MG/1
4 TABLET, ORALLY DISINTEGRATING ORAL ONCE
Refills: 0 | Status: DISCONTINUED | OUTPATIENT
Start: 2025-01-30 | End: 2025-01-31

## 2025-01-30 RX ORDER — BACTERIOSTATIC SODIUM CHLORIDE 0.9 %
1000 VIAL (ML) INJECTION ONCE
Refills: 0 | Status: COMPLETED | OUTPATIENT
Start: 2025-01-30 | End: 2025-01-30

## 2025-01-30 RX ADMIN — Medication 1000 MILLILITER(S): at 22:56

## 2025-01-30 NOTE — ED ADULT NURSE NOTE - OBJECTIVE STATEMENT
35 y/o M, [facility] UC, colostomy placed oct 2024, presents to the ED for abd cramping, nausea and decreased output from RLQ colostomy. states at 6am today, pt had abd cramping and noticed his ostomy output was liquid, usually is solid output. denies cp, sob, vomiting, fever, chills. stoma is beefy red. pt is A&ox4, speech is clear, no other medical complaints at this time.

## 2025-01-30 NOTE — ED PROVIDER NOTE - PHYSICAL EXAMINATION
Lisa Green DO (PGY2)   Physical Exam:    Gen: NAD, AOx3,   Lung: CTAB, no respiratory distress, no wheezes/rhonchi/rales B/L  CV: RRR, no murmurs, rubs or gallops  Abd: minimally ttp around ostomy site, no rebound, guarding, or distention

## 2025-01-30 NOTE — ED ADULT TRIAGE NOTE - CHIEF COMPLAINT QUOTE
c/o abdominal cramping, decreased solid output from ostomy since this morning. +liquid output and nausea. hx ulcerative colitis

## 2025-01-30 NOTE — ED PROVIDER NOTE - ATTENDING CONTRIBUTION TO CARE
Attending MD Kim:  I have seen and examined this patient and fully participated in the care of this patient as the teaching attending. I personally made/approved the management plan and take responsibility for the patient management.      34-year-old gentleman with a history of ulcerative colitis with ostomy presents for evaluation of watery output from his ostomy for 1 day.  Reports he is also having waves of pain behind the ostomy site.  He is concerned there might be a small amount of blood that was mixed in with the liquid output as well.  Mild nausea but no vomiting.  Denies any fevers or chills.    Patient's vital signs are nonactionable.  He is sitting in the stretcher in no apparent distress.  He is breathing comfortably on room air.  Abdomen is soft nondistended there is mild tenderness around the right lower quadrant ostomy site.  Ostomy bag with liquidy brown stool.  Extremities warm well-perfused.    Patient is presenting for evaluation of right lower quadrant abdominal pain and increased ostomy output.  Concerns would be for possible enteritis versus other pathology such as bowel obstruction.  Will obtain CT abdomen pelvis, surgical consult provide IV fluids check electrolytes and reassess.      *The above represents an initial assessment/impression. Please refer to progress notes for potential changes in patient clinical course*

## 2025-01-30 NOTE — ED PROVIDER NOTE - PROGRESS NOTE DETAILS
Attending MD Kim: Patient signed out to Dr. Palomares pending results of CT scan and surgical consultation. CT with read concerning for SBO. Surgery aware and would like patient admitted to their service Lisa Green DO (PGY-2)

## 2025-01-30 NOTE — ED ADULT NURSE NOTE - NSFALLCONCLUSION_ED_ALL_ED
General Surgery/Colorectal Surgery Note    Patient Name:  Donte Stallings  YOB: 1978  [id number]    Referring Provider: No ref. provider found      Patient Care Team:  Dawit Cazares APRN as PCP - General (Nurse Practitioner)    Chief complaint follow-up surgery    Subjective .     History of present illness:    Status post robotic repair of initial reducible umbilical and initial reducible epigastric incisional hernia with mesh 6/15/2022    He comes in for follow-up.  He was doing well.  He was hugging his significant other the other day and felt a pop to the epigastric area.  Increased pain since that time.  No bulge.  No incarceration or strangulation.  No imaging.  He has started back smoking.      History:  Past Medical History:   Diagnosis Date   • Adrenal mass (HCC)    • Allergies    • Anxiety    • Asthma    • Bipolar 1 disorder (HCC)    • Broken bones    • Depression    • Emphysema/COPD (HCC)    • Head injury    • Heart murmur    • High blood pressure    • Lumbago 11/17/2015   • Lumbar spinal stenosis 11/17/2015    L4-5   • Renal cancer (HCC) 05/17/2021   • Renal mass    • Skin disease    • SOB (shortness of breath)        Past Surgical History:   Procedure Laterality Date   • HAND SURGERY     • LEG SURGERY     • LUMBAR LAMINECTOMY  12/07/2015    L4-5   • NEPHRECTOMY      Right kidney    • VENTRAL HERNIA REPAIR N/A 6/15/2022    Procedure: VENTRAL Incisional HERNIA REPAIR LAPAROSCOPIC WITH DAVINCI ROBOT with mesh;  Surgeon: Tay Ash MD;  Location: [facility]  Service: Robotics - DaVinci;  Laterality: N/A;       Family History   Problem Relation Age of Onset   • Diabetes Paternal Grandmother    • Heart attack Paternal Grandmother    • Malig Hyperthermia Neg Hx        Social History     Tobacco Use   • Smoking status: Current Every Day Smoker     Packs/day: 0.50     Years: 25.00     Pack years: 12.50     Types: Cigarettes   • Smokeless tobacco: Never Used   • Tobacco comment:  down to 1 cig a day   Vaping Use   • Vaping Use: Never used   Substance Use Topics   • Alcohol use: Yes     Comment: Occasionally    • Drug use: Never       Review of Systems  All systems were reviewed and negative except for:   Review of Systems   Constitutional: Negative for chills, fever and unexpected weight loss.   HENT: Negative for congestion, nosebleeds and voice change.    Eyes: Negative for blurred vision, double vision and discharge.   Respiratory: Negative for apnea, chest tightness and shortness of breath.    Cardiovascular: Negative for chest pain and leg swelling.   Gastrointestinal:        See HPI   Endocrine: Negative for cold intolerance and heat intolerance.   Genitourinary: Negative for dysuria, hematuria and urgency.   Musculoskeletal: Negative for back pain, joint swelling and neck pain.   Skin: Negative for color change and dry skin.   Neurological: Negative for dizziness and confusion.   Hematological: Negative for adenopathy.   Psychiatric/Behavioral: Negative for agitation and behavioral problems.     MEDS:  Prior to Admission medications    Medication Sig Start Date End Date Taking? Authorizing Provider   amLODIPine (NORVASC) 5 MG tablet Take 1 tablet by mouth Daily. 6/7/22  Yes Dawit Cazares APRN   clobetasol (TEMOVATE) 0.05 % cream Apply 1 application topically to the appropriate area as directed 2 (Two) Times a Day. 6/7/22  Yes Dawit Cazares APRN   hydrocortisone 2.5 % cream Apply 1 application topically to the appropriate area as directed 2 (Two) Times a Day. 6/7/22  Yes Dawit Cazares APRN   levothyroxine (SYNTHROID, LEVOTHROID) 25 MCG tablet Take 1 tablet by mouth Daily. 6/7/22  Yes Dawit Cazares APRN   HYDROcodone-acetaminophen (Norco) 5-325 MG per tablet Take 1 tablet by mouth Every 6 (Six) Hours As Needed for Mild Pain . 6/15/22   Tay Ash MD   nicotine (NICODERM CQ) 21 MG/24HR patch Place 1 patch on the skin as directed by provider Daily. 6/8/22   Nilsa  "Tay Lawson MD   nicotine polacrilex (NICORETTE) 4 MG gum Chew 1 each As Needed for Smoking Cessation. 7/13/22   Tay Ash MD   polyethylene glycol (MIRALAX) 17 g packet Take 17 g by mouth Daily. 6/15/22   Tay Ash MD   traMADol (Ultram) 50 MG tablet Take 1 tablet by mouth Every 6 (Six) Hours As Needed for Moderate Pain . 6/21/22 6/21/23  Tay Ash MD        Allergies:  Patient has no known allergies.    Objective     Vital Signs   Resp:  [18] 18    Physical Exam:     General Appearance:    Alert, cooperative, in no acute distress   Head:    Normocephalic, without obvious abnormality, atraumatic   Eyes:          Conjunctivae and sclerae normal, no icterus,     Ears:    Ears appear intact with no abnormalities noted   Throat:   No oral lesions, no thrush, oral mucosa moist   Neck:   No adenopathy, supple, trachea midline, no thyromegaly   Back:     No kyphosis present, no scoliosis present, no skin lesions,      erythema or scars, no tenderness to percussion or                   palpation,   range of motion normal   Lungs:     Clear to auscultation,respirations regular, even and                  unlabored    Heart:    Regular rhythm and normal rate, normal S1 and S2, no            murmur, no gallop, no rub, no click   Chest Wall:    No abnormalities observed   Abdomen:     Normal bowel sounds, no masses, no organomegaly, soft        non-distended, no guarding, no rebound                tenderness, incisions are clean dry intact without evidence of infection, no hernia, minimal tenderness epigastric area   Rectal:        Extremities:   Moves all extremities well, no edema, no cyanosis, no             redness   Pulses:   Pulses palpable and equal bilaterally   Skin:   No bleeding, bruising or rash   Lymph nodes:   No palpable adenopathy   Neurologic:   A/o x 4 with no deficits       Results Review:   {Results Review:92636::\"I reviewed the patient's new clinical " "results.\"    LABS/IMAGING:  Results for orders placed or performed during the hospital encounter of 06/15/22   ECG 12 Lead   Result Value Ref Range    QT Interval 410 ms        Result Review :     Assessment & Plan     Status post robotic repair of initial reducible umbilical and initial reducible epigastric incisional hernia with mesh 6/15/2022  Tobacco abuse    I counseled him on the importance of quitting smoking.  Nicotine gum given.  He is agreeable to quit.  I explained to him he may have torn a fascial suture or the mesh may have moved.  Continue with general activity and stretching for now.  No heavy lifting for 6 weeks.  Encouraged him to wear an abdominal binder when he returns to work.  He has imaging to follow-up at cancer in the future and I encouraged him to contact me when he has had imaging and I will look at the images to look for recurrence.  All questions answered.  He agrees with plan.  Thank for the consult.      Donte Stallings  reports that he has been smoking cigarettes. He has a 12.50 pack-year smoking history. He has never used smokeless tobacco.. I have educated him on the risk of diseases from using tobacco products such as cancer, COPD and heart disease.     I advised him to quit and he is willing to quit. We have discussed the following method/s for tobacco cessation:  Counseling Prescription Medicaiton.  Together we have set a quit date for 1 week from today.  He will follow up with me in 1 day or sooner to check on his progress.    I spent 3  minutes counseling the patient.                This document has been electronically signed by Tay Ash MD  July 13, 2022 15:38 EDT  " Universal Safety Interventions

## 2025-01-30 NOTE — ED ADULT TRIAGE NOTE - GLASGOW COMA SCALE: SCORE, MLM
15 You can access the FollowMyHealth Patient Portal offered by [facility] by registering at the following website: [email or web address]. By joining [employer]’s FollowMyHealth portal, you will also be able to view your health information using other applications (apps) compatible with our system.

## 2025-01-30 NOTE — ED PROVIDER NOTE - CLINICAL SUMMARY MEDICAL DECISION MAKING FREE TEXT BOX
33 yo M past medical history of UC with ostomy on hydrocortisone and Budesonide presents fro one day of abd pain around his ostomy site and decreased ostomy output On exam vitally stable and well appearing with watery output from his ostomy. Will eval for sbo vs viral gastro enteritis. Dispo pending results. Will consult surgery. Lisa Green,  (PGY-2)

## 2025-01-30 NOTE — ED PROVIDER NOTE - OBJECTIVE STATEMENT
33 yo M past medical history of UC with ostomy on hydrocortisone and Budesonide presents fro one day of abd pain around his ostomy site and decreased ostomy output. Endorsing nausea. Denies fever, chills, chest pain, shortness of breath, vomiting, hematuria, dysuria, or any other symptoms at this time. Lisa Green, DO (PGY-2)

## 2025-01-31 ENCOUNTER — TRANSCRIPTION ENCOUNTER (OUTPATIENT)
Age: 35
End: 2025-01-31

## 2025-01-31 VITALS
DIASTOLIC BLOOD PRESSURE: 72 MMHG | TEMPERATURE: 98 F | RESPIRATION RATE: 18 BRPM | SYSTOLIC BLOOD PRESSURE: 122 MMHG | HEART RATE: 77 BPM | OXYGEN SATURATION: 98 %

## 2025-01-31 DIAGNOSIS — K56.609 UNSPECIFIED INTESTINAL OBSTRUCTION, UNSPECIFIED AS TO PARTIAL VERSUS COMPLETE OBSTRUCTION: ICD-10-CM

## 2025-01-31 LAB
FLUAV AG NPH QL: SIGNIFICANT CHANGE UP
FLUBV AG NPH QL: SIGNIFICANT CHANGE UP
RSV RNA NPH QL NAA+NON-PROBE: SIGNIFICANT CHANGE UP
SARS-COV-2 RNA SPEC QL NAA+PROBE: SIGNIFICANT CHANGE UP

## 2025-01-31 PROCEDURE — 74177 CT ABD & PELVIS W/CONTRAST: CPT | Mod: 26

## 2025-01-31 PROCEDURE — 81003 URINALYSIS AUTO W/O SCOPE: CPT

## 2025-01-31 PROCEDURE — 82435 ASSAY OF BLOOD CHLORIDE: CPT

## 2025-01-31 PROCEDURE — 99285 EMERGENCY DEPT VISIT HI MDM: CPT

## 2025-01-31 PROCEDURE — 82947 ASSAY GLUCOSE BLOOD QUANT: CPT

## 2025-01-31 PROCEDURE — 85025 COMPLETE CBC W/AUTO DIFF WBC: CPT

## 2025-01-31 PROCEDURE — 84132 ASSAY OF SERUM POTASSIUM: CPT

## 2025-01-31 PROCEDURE — 85610 PROTHROMBIN TIME: CPT

## 2025-01-31 PROCEDURE — 36000 PLACE NEEDLE IN VEIN: CPT

## 2025-01-31 PROCEDURE — 80053 COMPREHEN METABOLIC PANEL: CPT

## 2025-01-31 PROCEDURE — 82330 ASSAY OF CALCIUM: CPT

## 2025-01-31 PROCEDURE — 85014 HEMATOCRIT: CPT

## 2025-01-31 PROCEDURE — 74177 CT ABD & PELVIS W/CONTRAST: CPT | Mod: MC

## 2025-01-31 PROCEDURE — 83605 ASSAY OF LACTIC ACID: CPT

## 2025-01-31 PROCEDURE — 85018 HEMOGLOBIN: CPT

## 2025-01-31 PROCEDURE — 85730 THROMBOPLASTIN TIME PARTIAL: CPT

## 2025-01-31 PROCEDURE — 83690 ASSAY OF LIPASE: CPT

## 2025-01-31 PROCEDURE — 84295 ASSAY OF SERUM SODIUM: CPT

## 2025-01-31 PROCEDURE — 87637 SARSCOV2&INF A&B&RSV AMP PRB: CPT

## 2025-01-31 PROCEDURE — 82803 BLOOD GASES ANY COMBINATION: CPT

## 2025-01-31 PROCEDURE — 87086 URINE CULTURE/COLONY COUNT: CPT

## 2025-01-31 RX ORDER — HEPARIN SODIUM,PORCINE 10000/ML
5000 VIAL (ML) INJECTION EVERY 8 HOURS
Refills: 0 | Status: DISCONTINUED | OUTPATIENT
Start: 2025-01-31 | End: 2025-01-31

## 2025-01-31 RX ORDER — ACETAMINOPHEN 160 MG/5ML
650 SUSPENSION ORAL EVERY 6 HOURS
Refills: 0 | Status: DISCONTINUED | OUTPATIENT
Start: 2025-01-31 | End: 2025-01-31

## 2025-01-31 NOTE — DISCHARGE NOTE PROVIDER - NSDCCPCAREPLAN_GEN_ALL_CORE_FT
PRINCIPAL DISCHARGE DIAGNOSIS  Diagnosis: SBO (small bowel obstruction)  Assessment and Plan of Treatment: 1.  Low fiber diet  2.  Activity as tolerated  3.  Follow-up with Dr. Lu within 2 weeks.  Please call office for appointment.  Please call office or return to emergency room if you stop passing gas or having bowel movements, are unable to tolerate food or drink, have severe abdominal pain, persistent nausea or vomiting.

## 2025-01-31 NOTE — H&P ADULT - HISTORY OF PRESENT ILLNESS
34M PMHx UC, recent total abdominal colectomy and end ileostomy (10/24-Erkan) presenting with a 1x day hx of mild abd pain around his ostomy site and decreased ostomy output. Reports osotmy output and consistency had changed and he thought there may be a blockage so came to ED out of precaution. He otherwise denies subjective fever/chills, n/v, CP, SOB, urinary changes, or bloody ostomy output. In the ED AVSS, labs notable for W12.6, and CTAP demonstrating dilated loops of SB with mild wall thickening and surrounding edema/fluid in the right lower quadrant with transition point suspected just proximal to the ostomy site c/f SBO.

## 2025-01-31 NOTE — DISCHARGE NOTE NURSING/CASE MANAGEMENT/SOCIAL WORK - PATIENT PORTAL LINK FT
You can access the FollowMyHealth Patient Portal offered by [facility] by registering at the following website: [email or web address]. By joining [employer]’s FollowMyHealth portal, you will also be able to view your health information using other applications (apps) compatible with our system.

## 2025-01-31 NOTE — H&P ADULT - NSHPPHYSICALEXAM_GEN_ALL_CORE
General Appearance: no acute distress, NTND   Chest: airway intact, non-labored breathing  CV: no JVD, palpable pulses b/l  Abdomen: soft, non-tender, non-distended, ostomy pink, +/+ stool in ostomy bag   Extremities: WWP

## 2025-01-31 NOTE — DISCHARGE NOTE PROVIDER - CARE PROVIDER_API CALL
Karli Lu  Colon/Rectal Surgery  [address] [place] [id number]  Phone: [phone]  Fax: [phone]  Follow Up Time: 2 weeks

## 2025-01-31 NOTE — DISCHARGE NOTE PROVIDER - HOSPITAL COURSE
34M PMHx UC, recent total abdominal colectomy and end ileostomy (10/24-Aly) presenting with a 1x day hx of mild abd pain around his ostomy site and decreased ostomy output. Reports osotmy output and consistency had changed and he thought there may be a blockage so came to ED out of precaution. He otherwise denies subjective fever/chills, n/v, CP, SOB, urinary changes, or bloody ostomy output. In the ED AVSS, labs notable for W12.6, and CTAP demonstrating dilated loops of SB with mild wall thickening and surrounding edema/fluid in the right lower quadrant with transition point suspected just proximal to the ostomy site c/f SBO.   He was admitted to the colorectal service.  Symptoms resolved, ostomy function returned.  He was given a low fiber diet, which he tolerated without nausea or vomiting.  He is ambulating, voiding, is tolerating a diet and is stable for discharge home.  He will follow-up with Dr. Lu within 2 weeks.

## 2025-01-31 NOTE — DISCHARGE NOTE NURSING/CASE MANAGEMENT/SOCIAL WORK - NSDCPEFALRISK_GEN_ALL_CORE
For information on Fall & Injury Prevention, visit: [email or web address] OR  [email or web address] OR  https://www.cdc.gov/steadi/patient.html

## 2025-01-31 NOTE — DISCHARGE NOTE NURSING/CASE MANAGEMENT/SOCIAL WORK - FINANCIAL ASSISTANCE
[facility] provides services at a reduced cost to those who are determined to be eligible through [facility]’s financial assistance program. Information regarding [facility]’s financial assistance program can be found by going to [email or web address] or by calling [phone].

## 2025-01-31 NOTE — DISCHARGE NOTE PROVIDER - NSDCFUSCHEDAPPT_GEN_ALL_CORE_FT
[facility] Physician [address]  Scheduled Appointment: 02/03/2025

## 2025-01-31 NOTE — PATIENT PROFILE ADULT - FALL HARM RISK - HARM RISK INTERVENTIONS

## 2025-02-01 LAB
CULTURE RESULTS: NO GROWTH — SIGNIFICANT CHANGE UP
SPECIMEN SOURCE: SIGNIFICANT CHANGE UP

## 2025-02-03 ENCOUNTER — APPOINTMENT (OUTPATIENT)
Dept: COLORECTAL SURGERY | Facility: CLINIC | Age: 35
End: 2025-02-03

## 2025-02-03 VITALS
SYSTOLIC BLOOD PRESSURE: 134 MMHG | DIASTOLIC BLOOD PRESSURE: 77 MMHG | BODY MASS INDEX: 27.16 KG/M2 | WEIGHT: 169 LBS | HEIGHT: 66 IN | RESPIRATION RATE: 14 BRPM | OXYGEN SATURATION: 99 % | HEART RATE: 80 BPM

## 2025-02-05 ENCOUNTER — TRANSCRIPTION ENCOUNTER (OUTPATIENT)
Age: 35
End: 2025-02-05

## 2025-02-06 ENCOUNTER — TRANSCRIPTION ENCOUNTER (OUTPATIENT)
Age: 35
End: 2025-02-06

## 2025-02-10 ENCOUNTER — TRANSCRIPTION ENCOUNTER (OUTPATIENT)
Age: 35
End: 2025-02-10

## 2025-02-10 ENCOUNTER — APPOINTMENT (OUTPATIENT)
Dept: COLORECTAL SURGERY | Facility: CLINIC | Age: 35
End: 2025-02-10

## 2025-02-10 VITALS
DIASTOLIC BLOOD PRESSURE: 78 MMHG | BODY MASS INDEX: 26.89 KG/M2 | SYSTOLIC BLOOD PRESSURE: 127 MMHG | OXYGEN SATURATION: 99 % | WEIGHT: 167.31 LBS | HEART RATE: 82 BPM | HEIGHT: 66 IN

## 2025-02-10 DIAGNOSIS — K51.90 ULCERATIVE COLITIS, UNSPECIFIED, W/OUT COMPLICATIONS: ICD-10-CM

## 2025-02-10 PROCEDURE — 99214 OFFICE O/P EST MOD 30 MIN: CPT

## 2025-02-11 ENCOUNTER — TRANSCRIPTION ENCOUNTER (OUTPATIENT)
Age: 35
End: 2025-02-11

## 2025-02-12 ENCOUNTER — TRANSCRIPTION ENCOUNTER (OUTPATIENT)
Age: 35
End: 2025-02-12

## 2025-03-13 ENCOUNTER — APPOINTMENT (OUTPATIENT)
Dept: GASTROENTEROLOGY | Facility: CLINIC | Age: 35
End: 2025-03-13

## 2025-03-24 ENCOUNTER — NON-APPOINTMENT (OUTPATIENT)
Age: 35
End: 2025-03-24

## 2025-03-26 ENCOUNTER — APPOINTMENT (OUTPATIENT)
Dept: INTERNAL MEDICINE | Facility: CLINIC | Age: 35
End: 2025-03-26
Payer: COMMERCIAL

## 2025-03-26 ENCOUNTER — NON-APPOINTMENT (OUTPATIENT)
Age: 35
End: 2025-03-26

## 2025-03-26 VITALS
OXYGEN SATURATION: 98 % | HEART RATE: 71 BPM | SYSTOLIC BLOOD PRESSURE: 128 MMHG | HEIGHT: 66.14 IN | TEMPERATURE: 98.1 F | WEIGHT: 171 LBS | DIASTOLIC BLOOD PRESSURE: 75 MMHG | BODY MASS INDEX: 27.48 KG/M2

## 2025-03-26 DIAGNOSIS — K51.90 ULCERATIVE COLITIS, UNSPECIFIED, W/OUT COMPLICATIONS: ICD-10-CM

## 2025-03-26 DIAGNOSIS — Z00.00 ENCOUNTER FOR GENERAL ADULT MEDICAL EXAMINATION W/OUT ABNORMAL FINDINGS: ICD-10-CM

## 2025-03-26 PROCEDURE — 99385 PREV VISIT NEW AGE 18-39: CPT

## 2025-03-26 PROCEDURE — 36415 COLL VENOUS BLD VENIPUNCTURE: CPT

## 2025-03-26 PROCEDURE — G0444 DEPRESSION SCREEN ANNUAL: CPT | Mod: 59

## 2025-04-01 ENCOUNTER — NON-APPOINTMENT (OUTPATIENT)
Age: 35
End: 2025-04-01

## 2025-04-04 LAB
25(OH)D3 SERPL-MCNC: 25.2 NG/ML
ALBUMIN SERPL ELPH-MCNC: 4.6 G/DL
ALP BLD-CCNC: 72 U/L
ALT SERPL-CCNC: 22 U/L
ANION GAP SERPL CALC-SCNC: 12 MMOL/L
APPEARANCE: CLEAR
AST SERPL-CCNC: 35 U/L
BASOPHILS # BLD AUTO: 0.07 K/UL
BASOPHILS NFR BLD AUTO: 0.8 %
BILIRUB SERPL-MCNC: 0.6 MG/DL
BILIRUBIN URINE: NEGATIVE
BLOOD URINE: NEGATIVE
BUN SERPL-MCNC: 6 MG/DL
CALCIUM SERPL-MCNC: 9.8 MG/DL
CHLORIDE SERPL-SCNC: 103 MMOL/L
CHOLEST SERPL-MCNC: 176 MG/DL
CO2 SERPL-SCNC: 25 MMOL/L
COLOR: YELLOW
COPPER SERPL-MCNC: 98 UG/DL
CREAT SERPL-MCNC: 0.76 MG/DL
EGFRCR SERPLBLD CKD-EPI 2021: 122 ML/MIN/1.73M2
EOSINOPHIL # BLD AUTO: 0.27 K/UL
EOSINOPHIL NFR BLD AUTO: 3.1 %
ESTIMATED AVERAGE GLUCOSE: 103 MG/DL
FERRITIN SERPL-MCNC: 29 NG/ML
FOLATE SERPL-MCNC: 6.3 NG/ML
GLUCOSE QUALITATIVE U: NEGATIVE MG/DL
GLUCOSE SERPL-MCNC: 85 MG/DL
HBA1C MFR BLD HPLC: 5.2 %
HCT VFR BLD CALC: 44 %
HDLC SERPL-MCNC: 45 MG/DL
HGB BLD-MCNC: 13.8 G/DL
IMM GRANULOCYTES NFR BLD AUTO: 0.2 %
IRON SATN MFR SERPL: 21 %
IRON SERPL-MCNC: 74 UG/DL
KETONES URINE: ABNORMAL MG/DL
LDLC SERPL-MCNC: 103 MG/DL
LEUKOCYTE ESTERASE URINE: NEGATIVE
LYMPHOCYTES # BLD AUTO: 3.08 K/UL
LYMPHOCYTES NFR BLD AUTO: 35.2 %
MAN DIFF?: NORMAL
MCHC RBC-ENTMCNC: 25.7 PG
MCHC RBC-ENTMCNC: 31.4 G/DL
MCV RBC AUTO: 82.1 FL
MENADIONE SERPL-MCNC: 1.04 NG/ML
MONOCYTES # BLD AUTO: 1 K/UL
MONOCYTES NFR BLD AUTO: 11.4 %
NEUTROPHILS # BLD AUTO: 4.32 K/UL
NEUTROPHILS NFR BLD AUTO: 49.3 %
NITRITE URINE: NEGATIVE
NONHDLC SERPL-MCNC: 131 MG/DL
PH URINE: 5.5
PLATELET # BLD AUTO: 345 K/UL
POTASSIUM SERPL-SCNC: 4.6 MMOL/L
PROT SERPL-MCNC: 8.6 G/DL
PROTEIN URINE: NORMAL MG/DL
RBC # BLD: 5.36 M/UL
RBC # FLD: 15.1 %
SODIUM SERPL-SCNC: 140 MMOL/L
SPECIFIC GRAVITY URINE: 1.03
TIBC SERPL-MCNC: 346 UG/DL
TRIGL SERPL-MCNC: 156 MG/DL
TSH SERPL-ACNC: 0.99 UIU/ML
UIBC SERPL-MCNC: 272 UG/DL
UROBILINOGEN URINE: 0.2 MG/DL
VIT B1 SERPL-MCNC: 117.3 NMOL/L
VIT B12 SERPL-MCNC: 496 PG/ML
VIT B6 SERPL-MCNC: 9.8 UG/L
WBC # FLD AUTO: 8.76 K/UL

## 2025-04-30 NOTE — ED ADULT TRIAGE NOTE - AVIAN FLU SYMPTOMS
Type 2 secondary to sepsis and EMMA  Noted elevated troponin with underlying history of CAD on presentation  EKG showed no acute ischemic change  Continue to trend troponin     No

## 2025-05-10 NOTE — PROVIDER CONTACT NOTE (CHANGE IN STATUS NOTIFICATION) - RECOMMENDATIONS
Problem: At Risk for Falls  Goal: Patient does not fall  Outcome: Monitoring/Evaluating progress  Goal: Patient takes action to control fall-related risks  Outcome: Monitoring/Evaluating progress     Problem: At Risk for Injury Due to Fall  Goal: Patient does not fall  Outcome: Monitoring/Evaluating progress  Goal: Takes action to control condition specific risks  Outcome: Monitoring/Evaluating progress  Goal: Verbalizes understanding of fall-related injury personal risks  Description: Document education using the patient education activity  Outcome: Monitoring/Evaluating progress      continue to monitor, encourage PO fluids

## 2025-07-16 ENCOUNTER — NON-APPOINTMENT (OUTPATIENT)
Age: 35
End: 2025-07-16

## 2025-07-28 ENCOUNTER — APPOINTMENT (OUTPATIENT)
Dept: COLORECTAL SURGERY | Facility: CLINIC | Age: 35
End: 2025-07-28

## 2025-07-28 VITALS
HEIGHT: 66.14 IN | DIASTOLIC BLOOD PRESSURE: 75 MMHG | SYSTOLIC BLOOD PRESSURE: 113 MMHG | WEIGHT: 176.25 LBS | OXYGEN SATURATION: 96 % | BODY MASS INDEX: 28.33 KG/M2 | HEART RATE: 81 BPM

## 2025-07-28 NOTE — ED PROVIDER NOTE - PRINCIPAL DIAGNOSIS
Medication passed protocol.      passed protocol.   Last office visit date: 06/24/2025  Next appointment scheduled?: Yes      Diarrhea

## (undated) DEVICE — CATH IV SAFE BC 22G X 1" (BLUE)

## (undated) DEVICE — TROCAR COVIDIEN VERSAPORT BLADELESS OPTICAL 12MM STANDARD

## (undated) DEVICE — TROCAR APPLIED MEDICAL KII BALLOON BLUNT TIP 12MM X 100MM

## (undated) DEVICE — GLV 7.5 PROTEXIS (BLUE)

## (undated) DEVICE — BASIN EMESIS 10IN GRADUATED MAUVE

## (undated) DEVICE — PREP BETADINE KIT

## (undated) DEVICE — Device

## (undated) DEVICE — OSTOMY KIT 2-PIECE 2.75" NS (BLUE)

## (undated) DEVICE — TUBING STRYKER PNEUMOCLEAR SMOKE HEAT HUMID

## (undated) DEVICE — PREP CHLORAPREP HI-LITE ORANGE 26ML

## (undated) DEVICE — TUBING LAPAROSCOPIC 3/8INX10FT

## (undated) DEVICE — DRAPE TOWEL BLUE 17" X 24"

## (undated) DEVICE — VENODYNE/SCD SLEEVE CALF MEDIUM

## (undated) DEVICE — TROCAR COVIDIEN VERSAONE FIXATION CANNULA 5MM

## (undated) DEVICE — DRSG BANDAID 0.75X3"

## (undated) DEVICE — SUT SOFSILK 2-0 18" C-15

## (undated) DEVICE — POSITIONER PINK PAD PIGAZZI SYSTEM

## (undated) DEVICE — PACK COLON BUNDLE

## (undated) DEVICE — SALIVA EJECTOR (BLUE)

## (undated) DEVICE — POSITIONER FOAM EGG CRATE ULNAR 2PCS (PINK)

## (undated) DEVICE — SUT CHROMIC 0 18" TIES

## (undated) DEVICE — GOWN LG

## (undated) DEVICE — D HELP - CLEARVIEW CLEARIFY SYSTEM

## (undated) DEVICE — LUBRICATING JELLY HR ONE SHOT 3G

## (undated) DEVICE — DRAPE LEGGINGS XL

## (undated) DEVICE — BIOPSY FORCEP COLD DISP

## (undated) DEVICE — TUBING STRYKEFLOW II SUCTION / IRRIGATOR

## (undated) DEVICE — PACK IV START WITH CHG

## (undated) DEVICE — OSTOMY LOOP ROD EYELET BOTH END 2"

## (undated) DEVICE — FOLEY TRAY 16FR 5CC LF LUBRISIL ADVANCE TEMP CLOSED

## (undated) DEVICE — SUT POLYSORB 0 36" GU-46

## (undated) DEVICE — SUT SOFSILK 2-0 30" TIES

## (undated) DEVICE — WARMING BLANKET UPPER ADULT

## (undated) DEVICE — DRAIN JACKSON PRATT 10MM FLAT FULL NO TROCAR

## (undated) DEVICE — GRASPER LAPA 5MMX35CM

## (undated) DEVICE — TUBING TUR 2 PRONG

## (undated) DEVICE — TROCAR COVIDIEN VERSAPORT BLADELESS OPTICAL 5MM STANDARD

## (undated) DEVICE — TUBING SUCTION NONCONDUCTIVE 6MM X 12FT

## (undated) DEVICE — DRSG 2X2

## (undated) DEVICE — SUCTION YANKAUER OPEN TIP NO VENT CURVE

## (undated) DEVICE — TUBING MEDI-VAC W MAXIGRIP CONNECTORS 1/4"X6'

## (undated) DEVICE — OSTOMY KIT 2-PIECE 2.25" NS (RED)

## (undated) DEVICE — CONTAINER FORMALIN 80ML YELLOW

## (undated) DEVICE — ELCTR BOVIE PENCIL HANDPIECE ROCKER SWITCH 15FT

## (undated) DEVICE — SPECIMEN CONTAINER 100ML

## (undated) DEVICE — STAPLER COVIDIEN ENDO GIA STANDARD HANDLE

## (undated) DEVICE — SUT MONOCRYL 4-0 18" PS-2

## (undated) DEVICE — LIGASURE BLUNT TIP 37CM

## (undated) DEVICE — SUT CHROMIC 1 36" CTX

## (undated) DEVICE — TUBING PLUME AWAY 4.0

## (undated) DEVICE — ELCTR GROUNDING PAD ADULT COVIDIEN

## (undated) DEVICE — RETRACTOR LAPAROSCOPIC ALEXIS MEDIUM

## (undated) DEVICE — SUT CHROMIC 3-0 30" V-20

## (undated) DEVICE — GLV 7.5 PROTEXIS (WHITE)

## (undated) DEVICE — DRSG DERMABOND 0.7ML

## (undated) DEVICE — DRAIN RESERVOIR FOR JACKSON PRATT 100CC CARDINAL

## (undated) DEVICE — NEPTUNE II 4-PORT MANIFOLD

## (undated) DEVICE — CLAMP BX HOT RAD JAW 3

## (undated) DEVICE — SOL IRR POUR H2O 250ML

## (undated) DEVICE — PACK BASIN SPECIAL PROCEDURE

## (undated) DEVICE — SOL IRR POUR NS 0.9% 500ML

## (undated) DEVICE — ELCTR ECG CONDUCTIVE ADHESIVE

## (undated) DEVICE — SUT POLYSORB 3-0 30" V-20 UNDYED

## (undated) DEVICE — DRSG CURITY GAUZE SPONGE 4 X 4" 12-PLY NON-STERILE

## (undated) DEVICE — POSITIONER PURPLE ARM ONE STEP (LARGE)

## (undated) DEVICE — BIOPSY FORCEP RADIAL JAW 4 STANDARD WITH NEEDLE

## (undated) DEVICE — TUBING CAP SET ERBEFLO CLEVERCAP HYBRID CO2 FOR OLYMPUS SCOPES AND UCR

## (undated) DEVICE — TUBING IV SET GRAVITY 3Y 100" MACRO

## (undated) DEVICE — PACK MAJOR ABDOMINAL SUPINE